# Patient Record
Sex: FEMALE | Race: WHITE | Employment: OTHER | URBAN - METROPOLITAN AREA
[De-identification: names, ages, dates, MRNs, and addresses within clinical notes are randomized per-mention and may not be internally consistent; named-entity substitution may affect disease eponyms.]

---

## 2017-01-12 ENCOUNTER — OFFICE VISIT (OUTPATIENT)
Dept: CARDIOLOGY CLINIC | Age: 72
End: 2017-01-12

## 2017-01-12 VITALS
WEIGHT: 125.8 LBS | DIASTOLIC BLOOD PRESSURE: 70 MMHG | SYSTOLIC BLOOD PRESSURE: 122 MMHG | BODY MASS INDEX: 23.15 KG/M2 | OXYGEN SATURATION: 98 % | HEART RATE: 62 BPM | HEIGHT: 62 IN

## 2017-01-12 DIAGNOSIS — E78.2 MIXED HYPERLIPIDEMIA: ICD-10-CM

## 2017-01-12 DIAGNOSIS — I10 ESSENTIAL HYPERTENSION: Primary | ICD-10-CM

## 2017-01-12 DIAGNOSIS — I25.118 CORONARY ARTERY DISEASE OF NATIVE ARTERY OF NATIVE HEART WITH STABLE ANGINA PECTORIS (HCC): ICD-10-CM

## 2017-01-12 PROCEDURE — 99214 OFFICE O/P EST MOD 30 MIN: CPT | Performed by: INTERNAL MEDICINE

## 2017-01-12 RX ORDER — AMLODIPINE BESYLATE 5 MG/1
5 TABLET ORAL DAILY
COMMUNITY
End: 2019-06-21 | Stop reason: SDUPTHER

## 2017-01-12 RX ORDER — NITROGLYCERIN 0.4 MG/1
0.4 TABLET SUBLINGUAL EVERY 5 MIN PRN
Qty: 25 TABLET | Refills: 3 | Status: SHIPPED | OUTPATIENT
Start: 2017-01-12

## 2017-01-12 RX ORDER — ISOSORBIDE MONONITRATE 30 MG/1
30 TABLET, EXTENDED RELEASE ORAL DAILY
Qty: 30 TABLET | Refills: 6 | Status: SHIPPED | OUTPATIENT
Start: 2017-01-12 | End: 2017-07-27 | Stop reason: SDUPTHER

## 2017-07-27 RX ORDER — ISOSORBIDE MONONITRATE 30 MG/1
30 TABLET, EXTENDED RELEASE ORAL DAILY
Qty: 90 TABLET | Refills: 2 | Status: SHIPPED | OUTPATIENT
Start: 2017-07-27 | End: 2018-02-26 | Stop reason: SDUPTHER

## 2017-07-28 ENCOUNTER — HOSPITAL ENCOUNTER (OUTPATIENT)
Dept: GENERAL RADIOLOGY | Age: 72
Discharge: HOME | End: 2017-07-28
Attending: INTERNAL MEDICINE | Admitting: INTERNAL MEDICINE

## 2017-07-28 LAB
A/G RATIO: 0.9 (ref 1.1–2.2)
ALBUMIN SERPL-MCNC: 3.9 G/DL (ref 3.4–5)
ALP BLD-CCNC: 73 U/L (ref 40–129)
ALT SERPL-CCNC: <5 U/L (ref 10–40)
ANION GAP SERPL CALCULATED.3IONS-SCNC: 12 MMOL/L (ref 3–16)
AST SERPL-CCNC: 27 U/L (ref 15–37)
BASOPHILS ABSOLUTE: 0 K/UL (ref 0–0.2)
BASOPHILS RELATIVE PERCENT: 0.4 %
BILIRUB SERPL-MCNC: 0.9 MG/DL (ref 0–1)
BUN BLDV-MCNC: 21 MG/DL (ref 7–20)
CALCIUM SERPL-MCNC: 9.5 MG/DL (ref 8.3–10.6)
CHLORIDE BLD-SCNC: 94 MMOL/L (ref 99–110)
CHOLESTEROL, FASTING: 171 MG/DL (ref 0–199)
CO2: 28 MMOL/L (ref 21–32)
CREAT SERPL-MCNC: 0.8 MG/DL (ref 0.6–1.2)
EOSINOPHILS ABSOLUTE: 0 K/UL (ref 0–0.6)
EOSINOPHILS RELATIVE PERCENT: 0.4 %
GFR AFRICAN AMERICAN: >60
GFR NON-AFRICAN AMERICAN: >60
GLOBULIN: 4.5 G/DL
GLUCOSE BLD-MCNC: 80 MG/DL (ref 70–99)
HCT VFR BLD CALC: 40.7 % (ref 36–48)
HDLC SERPL-MCNC: 47 MG/DL (ref 40–60)
HEMOGLOBIN: 14.1 G/DL (ref 12–16)
LDL CHOLESTEROL CALCULATED: 102 MG/DL
LYMPHOCYTES ABSOLUTE: 1.5 K/UL (ref 1–5.1)
LYMPHOCYTES RELATIVE PERCENT: 34.2 %
MCH RBC QN AUTO: 34.2 PG (ref 26–34)
MCHC RBC AUTO-ENTMCNC: 34.5 G/DL (ref 31–36)
MCV RBC AUTO: 98.9 FL (ref 80–100)
MONOCYTES ABSOLUTE: 0.5 K/UL (ref 0–1.3)
MONOCYTES RELATIVE PERCENT: 10.8 %
NEUTROPHILS ABSOLUTE: 2.4 K/UL (ref 1.7–7.7)
NEUTROPHILS RELATIVE PERCENT: 54.2 %
PDW BLD-RTO: 12.7 % (ref 12.4–15.4)
PLATELET # BLD: 211 K/UL (ref 135–450)
PMV BLD AUTO: 8 FL (ref 5–10.5)
POTASSIUM SERPL-SCNC: 4.8 MMOL/L (ref 3.5–5.1)
RBC # BLD: 4.12 M/UL (ref 4–5.2)
SODIUM BLD-SCNC: 134 MMOL/L (ref 136–145)
TOTAL PROTEIN: 8.4 G/DL (ref 6.4–8.2)
TRIGLYCERIDE, FASTING: 110 MG/DL (ref 0–150)
TSH SERPL DL<=0.05 MIU/L-ACNC: 2.14 UIU/ML (ref 0.27–4.2)
VITAMIN B-12: 646 PG/ML (ref 211–911)
VITAMIN D 25-HYDROXY: 43.4 NG/ML
VLDLC SERPL CALC-MCNC: 22 MG/DL
WBC # BLD: 4.3 K/UL (ref 4–11)

## 2018-02-26 RX ORDER — ISOSORBIDE MONONITRATE 30 MG/1
TABLET, EXTENDED RELEASE ORAL
Qty: 90 TABLET | Refills: 0 | Status: SHIPPED | OUTPATIENT
Start: 2018-02-26 | End: 2019-06-21 | Stop reason: SDUPTHER

## 2018-04-19 PROBLEM — S72.002A HIP FRACTURE, LEFT, CLOSED, INITIAL ENCOUNTER (HCC): Status: ACTIVE | Noted: 2018-04-19

## 2018-05-02 ENCOUNTER — OFFICE VISIT (OUTPATIENT)
Dept: ORTHOPEDIC SURGERY | Age: 73
End: 2018-05-02

## 2018-05-02 VITALS
DIASTOLIC BLOOD PRESSURE: 55 MMHG | BODY MASS INDEX: 22.43 KG/M2 | SYSTOLIC BLOOD PRESSURE: 100 MMHG | WEIGHT: 121.91 LBS | HEIGHT: 62 IN | HEART RATE: 77 BPM

## 2018-05-02 DIAGNOSIS — S72.002A HIP FRACTURE, LEFT, CLOSED, INITIAL ENCOUNTER (HCC): ICD-10-CM

## 2018-05-02 DIAGNOSIS — M25.552 HIP PAIN, LEFT: Primary | ICD-10-CM

## 2018-05-02 PROCEDURE — 99024 POSTOP FOLLOW-UP VISIT: CPT | Performed by: ORTHOPAEDIC SURGERY

## 2018-05-02 RX ORDER — HYDROCODONE BITARTRATE AND ACETAMINOPHEN 5; 325 MG/1; MG/1
1 TABLET ORAL EVERY 8 HOURS PRN
Qty: 20 TABLET | Refills: 0 | Status: SHIPPED | OUTPATIENT
Start: 2018-05-02 | End: 2018-05-07

## 2018-05-25 ENCOUNTER — HOSPITAL ENCOUNTER (OUTPATIENT)
Dept: GENERAL RADIOLOGY | Age: 73
Discharge: HOME | End: 2018-05-25
Attending: INTERNAL MEDICINE | Admitting: INTERNAL MEDICINE

## 2018-05-25 LAB
A/G RATIO: 0.9 (ref 1.1–2.2)
ALBUMIN SERPL-MCNC: 3.9 G/DL (ref 3.4–5)
ALP BLD-CCNC: 82 U/L (ref 40–129)
ALT SERPL-CCNC: <5 U/L (ref 10–40)
ANION GAP SERPL CALCULATED.3IONS-SCNC: 15 MMOL/L (ref 3–16)
AST SERPL-CCNC: 28 U/L (ref 15–37)
BASOPHILS ABSOLUTE: 0 K/UL (ref 0–0.2)
BASOPHILS RELATIVE PERCENT: 0.7 %
BILIRUB SERPL-MCNC: 0.3 MG/DL (ref 0–1)
BUN BLDV-MCNC: 8 MG/DL (ref 7–20)
CALCIUM SERPL-MCNC: 9.6 MG/DL (ref 8.3–10.6)
CHLORIDE BLD-SCNC: 98 MMOL/L (ref 99–110)
CHOLESTEROL, TOTAL: 158 MG/DL (ref 0–199)
CO2: 25 MMOL/L (ref 21–32)
CREAT SERPL-MCNC: 0.5 MG/DL (ref 0.6–1.2)
EOSINOPHILS ABSOLUTE: 0 K/UL (ref 0–0.6)
EOSINOPHILS RELATIVE PERCENT: 0.6 %
GFR AFRICAN AMERICAN: >60
GFR NON-AFRICAN AMERICAN: >60
GLOBULIN: 4.5 G/DL
GLUCOSE BLD-MCNC: 92 MG/DL (ref 70–99)
HCT VFR BLD CALC: 39.6 % (ref 36–48)
HDLC SERPL-MCNC: 43 MG/DL (ref 40–60)
HEMOGLOBIN: 13.6 G/DL (ref 12–16)
LDL CHOLESTEROL CALCULATED: 74 MG/DL
LYMPHOCYTES ABSOLUTE: 1.4 K/UL (ref 1–5.1)
LYMPHOCYTES RELATIVE PERCENT: 25.2 %
MCH RBC QN AUTO: 33.9 PG (ref 26–34)
MCHC RBC AUTO-ENTMCNC: 34.4 G/DL (ref 31–36)
MCV RBC AUTO: 98.4 FL (ref 80–100)
MONOCYTES ABSOLUTE: 0.4 K/UL (ref 0–1.3)
MONOCYTES RELATIVE PERCENT: 8 %
NEUTROPHILS ABSOLUTE: 3.5 K/UL (ref 1.7–7.7)
NEUTROPHILS RELATIVE PERCENT: 65.5 %
PDW BLD-RTO: 13.8 % (ref 12.4–15.4)
PLATELET # BLD: 227 K/UL (ref 135–450)
PMV BLD AUTO: 7.8 FL (ref 5–10.5)
POTASSIUM SERPL-SCNC: 4.6 MMOL/L (ref 3.5–5.1)
RBC # BLD: 4.02 M/UL (ref 4–5.2)
SODIUM BLD-SCNC: 138 MMOL/L (ref 136–145)
TOTAL PROTEIN: 8.4 G/DL (ref 6.4–8.2)
TRIGL SERPL-MCNC: 207 MG/DL (ref 0–150)
TSH SERPL DL<=0.05 MIU/L-ACNC: 3.1 UIU/ML (ref 0.27–4.2)
VITAMIN B-12: 768 PG/ML (ref 211–911)
VLDLC SERPL CALC-MCNC: 41 MG/DL
WBC # BLD: 5.4 K/UL (ref 4–11)

## 2018-06-13 ENCOUNTER — OFFICE VISIT (OUTPATIENT)
Dept: ORTHOPEDIC SURGERY | Age: 73
End: 2018-06-13

## 2018-06-13 VITALS
HEART RATE: 72 BPM | DIASTOLIC BLOOD PRESSURE: 65 MMHG | HEIGHT: 62 IN | BODY MASS INDEX: 22.43 KG/M2 | SYSTOLIC BLOOD PRESSURE: 102 MMHG | WEIGHT: 121.91 LBS

## 2018-06-13 DIAGNOSIS — S72.002A HIP FRACTURE, LEFT, CLOSED, INITIAL ENCOUNTER (HCC): ICD-10-CM

## 2018-06-13 DIAGNOSIS — M25.552 HIP PAIN, LEFT: Primary | ICD-10-CM

## 2018-06-13 PROCEDURE — 99024 POSTOP FOLLOW-UP VISIT: CPT | Performed by: ORTHOPAEDIC SURGERY

## 2018-06-13 RX ORDER — TRAMADOL HYDROCHLORIDE 50 MG/1
50 TABLET ORAL EVERY 8 HOURS PRN
Qty: 21 TABLET | Refills: 0 | Status: SHIPPED | OUTPATIENT
Start: 2018-06-13 | End: 2019-02-12 | Stop reason: SDUPTHER

## 2018-07-25 ENCOUNTER — OFFICE VISIT (OUTPATIENT)
Dept: ORTHOPEDIC SURGERY | Age: 73
End: 2018-07-25

## 2018-07-25 VITALS
HEIGHT: 62 IN | WEIGHT: 121 LBS | SYSTOLIC BLOOD PRESSURE: 142 MMHG | BODY MASS INDEX: 22.26 KG/M2 | DIASTOLIC BLOOD PRESSURE: 81 MMHG | HEART RATE: 80 BPM

## 2018-07-25 DIAGNOSIS — S72.002A HIP FRACTURE, LEFT, CLOSED, INITIAL ENCOUNTER (HCC): ICD-10-CM

## 2018-07-25 DIAGNOSIS — M25.552 HIP PAIN, LEFT: Primary | ICD-10-CM

## 2018-07-25 PROCEDURE — 99212 OFFICE O/P EST SF 10 MIN: CPT | Performed by: ORTHOPAEDIC SURGERY

## 2018-10-24 ENCOUNTER — OFFICE VISIT (OUTPATIENT)
Dept: ORTHOPEDIC SURGERY | Age: 73
End: 2018-10-24
Payer: MEDICARE

## 2018-10-24 VITALS
SYSTOLIC BLOOD PRESSURE: 136 MMHG | DIASTOLIC BLOOD PRESSURE: 80 MMHG | BODY MASS INDEX: 22.27 KG/M2 | HEIGHT: 62 IN | HEART RATE: 66 BPM | WEIGHT: 121.03 LBS

## 2018-10-24 DIAGNOSIS — S72.002A HIP FRACTURE, LEFT, CLOSED, INITIAL ENCOUNTER (HCC): ICD-10-CM

## 2018-10-24 DIAGNOSIS — M25.552 HIP PAIN, LEFT: Primary | ICD-10-CM

## 2018-10-24 PROCEDURE — 99212 OFFICE O/P EST SF 10 MIN: CPT | Performed by: ORTHOPAEDIC SURGERY

## 2018-10-26 ENCOUNTER — TELEPHONE (OUTPATIENT)
Dept: FAMILY MEDICINE CLINIC | Age: 73
End: 2018-10-26

## 2018-10-26 NOTE — TELEPHONE ENCOUNTER
Pt called wanting to become a new pt of Dr Anna Contreras. Her son-in-law, Ira Carrel, is an est pt of Dr Anna Contreras currently. Please call pt with info. Pt was advised if he can not, then Dr Romeo Espinosa is taking new pt's.  Pt did not want to est with an NP.

## 2018-10-29 ENCOUNTER — HOSPITAL ENCOUNTER (OUTPATIENT)
Dept: PHYSICAL THERAPY | Age: 73
Setting detail: THERAPIES SERIES
Discharge: HOME | End: 2018-10-29
Payer: MEDICARE

## 2018-10-29 PROCEDURE — G8980 MOBILITY D/C STATUS: HCPCS

## 2018-10-29 PROCEDURE — 97161 PT EVAL LOW COMPLEX 20 MIN: CPT

## 2018-10-29 PROCEDURE — 97112 NEUROMUSCULAR REEDUCATION: CPT

## 2018-10-29 PROCEDURE — 97110 THERAPEUTIC EXERCISES: CPT

## 2018-10-29 PROCEDURE — G8979 MOBILITY GOAL STATUS: HCPCS

## 2018-10-29 PROCEDURE — G8978 MOBILITY CURRENT STATUS: HCPCS

## 2018-10-29 NOTE — PLAN OF CARE
hemiarthroplasty   []Normal    []Hypo   []Hyper    Palpation: Tightness of iliopsoas, Adductors, and TFL on L side    Functional Mobility/Transfers: WFL    Posture: Forward head, B anterior shoulders, decreased WB onto LLE (habitual)    Bandages/Dressings/Incisions: Incision along posterolateral aspect of gluteal/hip, healed well without signs of infection or fibrosis    Gait: (include devices/WB status) Steady-cane used in RUE (per patient, instructed to continue cane use), slowed gait speed non-antalgic with use of cane (short-distances observed only)    Orthopedic Special Tests: SLR positive L>R for tightness of B hamstrings (distal/proximal)                       [x] Patient history, allergies, meds reviewed. Medical chart reviewed. See intake form. Review Of Systems (ROS):  [x]Performed Review of systems (Integumentary, CardioPulmonary, Neurological) by intake and observation. Intake form has been scanned into medical record. Patient has been instructed to contact their primary care physician regarding ROS issues if not already being addressed at this time.       Co-morbidities/Complexities (which will affect course of rehabilitation):   []None           Arthritic conditions   []Rheumatoid arthritis (M05.9)  []Osteoarthritis (M19.91)   Cardiovascular conditions   [x]Hypertension (I10)  [x]Hyperlipidemia (E78.5)  []Angina pectoris (I20)  []Atherosclerosis (I70)   Musculoskeletal conditions   []Disc pathology   []Congenital spine pathologies   []Prior surgical intervention  []Osteoporosis (M81.8)  []Osteopenia (M85.8)   Endocrine conditions   []Hypothyroid (E03.9)  []Hyperthyroid Gastrointestinal conditions   []Constipation (Q41.55)   Metabolic conditions   []Morbid obesity (E66.01)  []Diabetes type 1(E10.65) or 2 (E11.65)   []Neuropathy (G60.9)     Pulmonary conditions   []Asthma (J45)  []Coughing   []COPD (J44.9)   Psychological Disorders  []Anxiety (F41.9)  []Depression (F32.9)   []Other:   [x]Other:

## 2018-10-29 NOTE — DISCHARGE SUMMARY
[facility]    [facility] Therapy Daily Treatment / Discharge Note  Date:  10/29/2018    Patient Name:  Elin Nicholson    :  1945  MRN: [id number]  Restrictions/Precautions:    Medical/Treatment Diagnosis Information:  Diagnosis: M25.552 (ICD-10-CM) - Hip pain, left; S72.002A (ICD-10-CM) - Hip fracture, left, closed, initial encounter [facility])  Treatment Diagnosis: M25.552 L hip pain s/p L hip hemiarthroplasty (DOS )  Insurance/Certification information:  PT Insurance Information: 2018 [place]-$40CP-100%-[employer]ec-Needs Auth  Physician Information:  Referring Practitioner: Dr. Citnia Ortega of care signed (Y/N):     Date of Patient follow up with Physician: HORACE    G-Code (if applicable):      Date G-Code Applied:  10/29/18  PT G-Codes  Functional Assessment Tool Used: LEFS  Score: 49%  Functional Limitation: Mobility: Walking and moving around  Mobility: Walking and Moving Around Current Status (): At least 40 percent but less than 60 percent impaired, limited or restricted  Mobility: Walking and Moving Around Goal Status (): At least 40 percent but less than 60 percent impaired, limited or restricted  Mobility: Walking and Moving Around Discharge Status ():  At least 40 percent but less than 60 percent impaired, limited or restricted    Progress Note: [x]  Yes  []  No  Next due by: Visit #10       Latex Allergy:  [x]NO      []YES  Preferred Language for Healthcare:   [x]English       []other:    Visit # Insurance Allowable   1 Med [employer] NEEDS AUTH     Pain level: 0/10 6-7/10 with squatting/bending    SUBJECTIVE:  See eval    OBJECTIVE: See eval  Observation:   Test measurements:      RESTRICTIONS/PRECAUTIONS: Parkinson's Disease, HTN/HLD, Hip hemiarthroplasty s/p prox femoral fx (no ext/ER)    Exercises/Interventions:     Therapeutic Ex/NMR Ex Sets/sec Reps Notes HEP   HL hip add squeeze 10\" 10  x   SL clamshell 2 10ea GTB x   HSS/ proprioception of core, proximal hip and LE for self care, mobility, lifting, and ambulation/stair navigation      Manual Treatments:  PROM / STM / Oscillations-Mobs:  G-I, II, III, IV (PA's, Inf., Post.)  [] (99765) Provided manual therapy to mobilize LE, proximal hip and/or LS spine soft tissue/joints for the purpose of modulating pain, promoting relaxation,  increasing ROM, reducing/eliminating soft tissue swelling/inflammation/restriction, improving soft tissue extensibility and allowing for proper ROM for normal function with self care, mobility, lifting and ambulation. Modalities:  None    Charges:  Timed Code Treatment Minutes: 30   Total Treatment Minutes: 50     [x] EVAL low  [x] AG(21200) x  1   [] IONTO  [x] NMR (55103) x  1   [] VASO  [] Manual (14656) x       [] Other:  [] TA x       [] Mech Traction (16201)  [] ES(attended) (09209)      [] ES (un) (48567):     GOALS:  Patient stated goal: to gain strength and have less pain in left hip/groing and leg    Therapist goals for Patient:   Short Term Goals: To be achieved in: 1 week  1. Independent in HEP and progression per patient tolerance, in order to prevent re-injury. Progression Towards Functional goals:  [] Patient is progressing as expected towards functional goals listed. [] Progression is slowed due to complexities listed. [] Progression has been slowed due to co-morbidities. [x] Plan just implemented, too soon to assess goals progression  [] Other:     ASSESSMENT:  See eval    Treatment/Activity Tolerance:  [x] Patient tolerated treatment well [] Patient limited by fatique  [x] Patient limited by pain  [] Patient limited by other medical complications  [] Other:     Prognosis: [x] Good [] Fair  [] Poor    Patient Requires Follow-up: [] Yes  [x] No    PLAN: See eval. Referral HEP 1-2 visits.  Pt preference for this to be final/only visit  [] Continue per plan of care [] Alter current plan (see comments)  [] Plan of care initiated []

## 2018-11-26 ENCOUNTER — HOSPITAL ENCOUNTER (OUTPATIENT)
Dept: WOMENS IMAGING | Age: 73
Discharge: HOME | End: 2018-11-26
Payer: MEDICARE

## 2018-11-26 DIAGNOSIS — M81.0 OSTEOPOROSIS, SENILE: ICD-10-CM

## 2018-11-26 PROCEDURE — 77080 DXA BONE DENSITY AXIAL: CPT

## 2019-01-22 ENCOUNTER — OFFICE VISIT (OUTPATIENT)
Dept: FAMILY MEDICINE CLINIC | Age: 74
End: 2019-01-22
Payer: MEDICARE

## 2019-01-22 VITALS
OXYGEN SATURATION: 99 % | WEIGHT: 121 LBS | HEART RATE: 68 BPM | BODY MASS INDEX: 22.27 KG/M2 | DIASTOLIC BLOOD PRESSURE: 88 MMHG | SYSTOLIC BLOOD PRESSURE: 138 MMHG

## 2019-01-22 DIAGNOSIS — E78.2 MIXED HYPERLIPIDEMIA: ICD-10-CM

## 2019-01-22 DIAGNOSIS — G20 PARKINSON DISEASE (HCC): ICD-10-CM

## 2019-01-22 DIAGNOSIS — R53.83 FATIGUE, UNSPECIFIED TYPE: ICD-10-CM

## 2019-01-22 DIAGNOSIS — M85.80 OSTEOPENIA, UNSPECIFIED LOCATION: ICD-10-CM

## 2019-01-22 DIAGNOSIS — I10 ESSENTIAL HYPERTENSION: ICD-10-CM

## 2019-01-22 DIAGNOSIS — M35.00 SJOGREN'S SYNDROME, WITH UNSPECIFIED ORGAN INVOLVEMENT (HCC): Primary | ICD-10-CM

## 2019-01-22 LAB
A/G RATIO: 1.1 (ref 1.1–2.2)
ALBUMIN SERPL-MCNC: 4.5 G/DL (ref 3.4–5)
ALP BLD-CCNC: 74 U/L (ref 40–129)
ALT SERPL-CCNC: 6 U/L (ref 10–40)
ANION GAP SERPL CALCULATED.3IONS-SCNC: 12 MMOL/L (ref 3–16)
AST SERPL-CCNC: 25 U/L (ref 15–37)
BASOPHILS ABSOLUTE: 0 K/UL (ref 0–0.2)
BASOPHILS RELATIVE PERCENT: 0.3 %
BILIRUB SERPL-MCNC: 0.6 MG/DL (ref 0–1)
BUN BLDV-MCNC: 17 MG/DL (ref 7–20)
C-REACTIVE PROTEIN: 0.6 MG/L (ref 0–5.1)
CALCIUM SERPL-MCNC: 9.4 MG/DL (ref 8.3–10.6)
CHLORIDE BLD-SCNC: 104 MMOL/L (ref 99–110)
CO2: 27 MMOL/L (ref 21–32)
CREAT SERPL-MCNC: 0.8 MG/DL (ref 0.6–1.2)
EOSINOPHILS ABSOLUTE: 0 K/UL (ref 0–0.6)
EOSINOPHILS RELATIVE PERCENT: 0.9 %
GFR AFRICAN AMERICAN: >60
GFR NON-AFRICAN AMERICAN: >60
GLOBULIN: 4.2 G/DL
GLUCOSE BLD-MCNC: 98 MG/DL (ref 70–99)
HCT VFR BLD CALC: 41.9 % (ref 36–48)
HEMOGLOBIN: 14.1 G/DL (ref 12–16)
LYMPHOCYTES ABSOLUTE: 1.1 K/UL (ref 1–5.1)
LYMPHOCYTES RELATIVE PERCENT: 32.2 %
MCH RBC QN AUTO: 33 PG (ref 26–34)
MCHC RBC AUTO-ENTMCNC: 33.6 G/DL (ref 31–36)
MCV RBC AUTO: 98.2 FL (ref 80–100)
MONOCYTES ABSOLUTE: 0.4 K/UL (ref 0–1.3)
MONOCYTES RELATIVE PERCENT: 10.2 %
NEUTROPHILS ABSOLUTE: 2 K/UL (ref 1.7–7.7)
NEUTROPHILS RELATIVE PERCENT: 56.4 %
PDW BLD-RTO: 12.5 % (ref 12.4–15.4)
PLATELET # BLD: 180 K/UL (ref 135–450)
PMV BLD AUTO: 8.2 FL (ref 5–10.5)
POTASSIUM SERPL-SCNC: 4.3 MMOL/L (ref 3.5–5.1)
RBC # BLD: 4.27 M/UL (ref 4–5.2)
SEDIMENTATION RATE, ERYTHROCYTE: 84 MM/HR (ref 0–30)
SODIUM BLD-SCNC: 143 MMOL/L (ref 136–145)
TOTAL PROTEIN: 8.7 G/DL (ref 6.4–8.2)
TSH REFLEX: 3.96 UIU/ML (ref 0.27–4.2)
WBC # BLD: 3.5 K/UL (ref 4–11)

## 2019-01-22 PROCEDURE — 36415 COLL VENOUS BLD VENIPUNCTURE: CPT | Performed by: FAMILY MEDICINE

## 2019-01-22 PROCEDURE — 99214 OFFICE O/P EST MOD 30 MIN: CPT | Performed by: FAMILY MEDICINE

## 2019-01-22 RX ORDER — ALENDRONATE SODIUM 70 MG/1
70 TABLET ORAL
Qty: 4 TABLET | Refills: 5 | Status: SHIPPED | OUTPATIENT
Start: 2019-01-22 | End: 2019-03-15 | Stop reason: ALTCHOICE

## 2019-01-22 RX ORDER — GUAIFENESIN 600 MG/1
1200 TABLET, EXTENDED RELEASE ORAL PRN
COMMUNITY
End: 2022-06-27 | Stop reason: ALTCHOICE

## 2019-01-22 RX ORDER — B-COMPLEX WITH VITAMIN C
1 TABLET ORAL DAILY
Qty: 30 TABLET | Refills: 0 | Status: SHIPPED | OUTPATIENT
Start: 2019-01-22 | End: 2019-02-08 | Stop reason: SDUPTHER

## 2019-01-22 RX ORDER — LOSARTAN POTASSIUM 50 MG/1
TABLET ORAL
COMMUNITY
Start: 2018-11-21 | End: 2019-04-23 | Stop reason: SDUPTHER

## 2019-01-22 ASSESSMENT — PATIENT HEALTH QUESTIONNAIRE - PHQ9
2. FEELING DOWN, DEPRESSED OR HOPELESS: 0
SUM OF ALL RESPONSES TO PHQ9 QUESTIONS 1 & 2: 0
SUM OF ALL RESPONSES TO PHQ QUESTIONS 1-9: 0
SUM OF ALL RESPONSES TO PHQ QUESTIONS 1-9: 0
1. LITTLE INTEREST OR PLEASURE IN DOING THINGS: 0

## 2019-01-23 ENCOUNTER — TELEPHONE (OUTPATIENT)
Dept: FAMILY MEDICINE CLINIC | Age: 74
End: 2019-01-23

## 2019-01-23 LAB
CHOLESTEROL, TOTAL: 195 MG/DL (ref 0–199)
HDLC SERPL-MCNC: 45 MG/DL (ref 40–60)
LDL CHOLESTEROL CALCULATED: 124 MG/DL
TRIGL SERPL-MCNC: 131 MG/DL (ref 0–150)
VLDLC SERPL CALC-MCNC: 26 MG/DL

## 2019-02-01 ENCOUNTER — OFFICE VISIT (OUTPATIENT)
Dept: ORTHOPEDIC SURGERY | Age: 74
End: 2019-02-01
Payer: MEDICARE

## 2019-02-01 VITALS
HEART RATE: 84 BPM | WEIGHT: 121.03 LBS | HEIGHT: 62 IN | SYSTOLIC BLOOD PRESSURE: 154 MMHG | DIASTOLIC BLOOD PRESSURE: 101 MMHG | BODY MASS INDEX: 22.27 KG/M2

## 2019-02-01 DIAGNOSIS — M25.552 LEFT HIP PAIN: Primary | ICD-10-CM

## 2019-02-01 DIAGNOSIS — S72.002A HIP FRACTURE, LEFT, CLOSED, INITIAL ENCOUNTER (HCC): ICD-10-CM

## 2019-02-01 PROCEDURE — 99212 OFFICE O/P EST SF 10 MIN: CPT | Performed by: ORTHOPAEDIC SURGERY

## 2019-02-08 DIAGNOSIS — M85.80 OSTEOPENIA, UNSPECIFIED LOCATION: ICD-10-CM

## 2019-02-08 RX ORDER — B-COMPLEX WITH VITAMIN C
TABLET ORAL
Qty: 30 TABLET | Refills: 0 | Status: SHIPPED | OUTPATIENT
Start: 2019-02-08

## 2019-02-12 ENCOUNTER — TELEPHONE (OUTPATIENT)
Dept: ORTHOPEDIC SURGERY | Age: 74
End: 2019-02-12

## 2019-02-12 DIAGNOSIS — S72.002A HIP FRACTURE, LEFT, CLOSED, INITIAL ENCOUNTER (HCC): ICD-10-CM

## 2019-02-12 RX ORDER — TRAMADOL HYDROCHLORIDE 50 MG/1
50 TABLET ORAL EVERY 8 HOURS PRN
Qty: 21 TABLET | Refills: 0 | Status: SHIPPED | OUTPATIENT
Start: 2019-02-12 | End: 2019-05-24 | Stop reason: SDUPTHER

## 2019-02-21 ENCOUNTER — OFFICE VISIT (OUTPATIENT)
Dept: FAMILY MEDICINE CLINIC | Age: 74
End: 2019-02-21
Payer: MEDICARE

## 2019-02-21 VITALS
SYSTOLIC BLOOD PRESSURE: 138 MMHG | OXYGEN SATURATION: 98 % | WEIGHT: 122 LBS | DIASTOLIC BLOOD PRESSURE: 60 MMHG | HEART RATE: 72 BPM | BODY MASS INDEX: 22.45 KG/M2

## 2019-02-21 DIAGNOSIS — M85.80 OSTEOPENIA, UNSPECIFIED LOCATION: ICD-10-CM

## 2019-02-21 DIAGNOSIS — K20.90 ESOPHAGITIS: Primary | ICD-10-CM

## 2019-02-21 PROCEDURE — 99214 OFFICE O/P EST MOD 30 MIN: CPT | Performed by: FAMILY MEDICINE

## 2019-02-21 RX ORDER — SUCRALFATE ORAL 1 G/10ML
1 SUSPENSION ORAL 4 TIMES DAILY
Qty: 1200 ML | Refills: 0 | Status: SHIPPED | OUTPATIENT
Start: 2019-02-21 | End: 2019-03-18

## 2019-02-21 ASSESSMENT — ENCOUNTER SYMPTOMS
BLOOD IN STOOL: 0
COUGH: 0
BACK PAIN: 1
CONSTIPATION: 0
ABDOMINAL PAIN: 0
DIARRHEA: 0
CHEST TIGHTNESS: 0

## 2019-03-01 PROBLEM — M85.80 OSTEOPENIA: Status: ACTIVE | Noted: 2019-03-01

## 2019-03-05 ENCOUNTER — TELEPHONE (OUTPATIENT)
Dept: FAMILY MEDICINE CLINIC | Age: 74
End: 2019-03-05

## 2019-03-06 RX ORDER — CANDESARTAN 16 MG/1
16 TABLET ORAL DAILY
Qty: 30 TABLET | Refills: 5 | Status: SHIPPED | OUTPATIENT
Start: 2019-03-06 | End: 2019-03-18

## 2019-03-15 ENCOUNTER — TELEPHONE (OUTPATIENT)
Dept: FAMILY MEDICINE CLINIC | Age: 74
End: 2019-03-15

## 2019-03-15 ENCOUNTER — HOSPITAL ENCOUNTER (OUTPATIENT)
Dept: NURSING | Age: 74
Setting detail: INFUSION SERIES
Discharge: HOME | End: 2019-03-15
Payer: MEDICARE

## 2019-03-15 VITALS
RESPIRATION RATE: 18 BRPM | TEMPERATURE: 97.8 F | DIASTOLIC BLOOD PRESSURE: 77 MMHG | HEART RATE: 56 BPM | WEIGHT: 120 LBS | HEIGHT: 61 IN | SYSTOLIC BLOOD PRESSURE: 130 MMHG | BODY MASS INDEX: 22.66 KG/M2

## 2019-03-15 DIAGNOSIS — M85.80 OSTEOPENIA, UNSPECIFIED LOCATION: Primary | ICD-10-CM

## 2019-03-15 PROCEDURE — 96372 THER/PROPH/DIAG INJ SC/IM: CPT

## 2019-03-15 PROCEDURE — 6360000002 HC RX W HCPCS: Performed by: FAMILY MEDICINE

## 2019-03-15 RX ADMIN — DENOSUMAB 60 MG: 60 INJECTION SUBCUTANEOUS at 10:26

## 2019-03-15 ASSESSMENT — PAIN - FUNCTIONAL ASSESSMENT: PAIN_FUNCTIONAL_ASSESSMENT: 0-10

## 2019-03-29 ENCOUNTER — TELEPHONE (OUTPATIENT)
Dept: FAMILY MEDICINE CLINIC | Age: 74
End: 2019-03-29

## 2019-03-29 RX ORDER — CARBIDOPA AND LEVODOPA 25; 100 MG/1; MG/1
1 TABLET, ORALLY DISINTEGRATING ORAL 3 TIMES DAILY
Qty: 270 TABLET | Refills: 1 | Status: SHIPPED | OUTPATIENT
Start: 2019-03-29 | End: 2019-05-07 | Stop reason: SDUPTHER

## 2019-03-29 NOTE — TELEPHONE ENCOUNTER
Pharmacy called on medication. Pt takes the medication sinement  mg tabs. She takes 1 tablet 3 times daily. Gets a 90 day supply of #270. rxd previously by dr Osorio Hernández.

## 2019-03-29 NOTE — TELEPHONE ENCOUNTER
I called [facility] and they said that Dr. Ciaran Ferrer was her previous PCP but now [facility] is so he would need to fill it.

## 2019-04-09 DIAGNOSIS — M25.552 LEFT HIP PAIN: Primary | ICD-10-CM

## 2019-04-16 ENCOUNTER — HOSPITAL ENCOUNTER (OUTPATIENT)
Dept: PHYSICAL THERAPY | Age: 74
Setting detail: THERAPIES SERIES
Discharge: HOME | End: 2019-04-16
Payer: MEDICARE

## 2019-04-16 PROCEDURE — 97110 THERAPEUTIC EXERCISES: CPT | Performed by: PHYSICAL THERAPIST

## 2019-04-16 PROCEDURE — 97161 PT EVAL LOW COMPLEX 20 MIN: CPT | Performed by: PHYSICAL THERAPIST

## 2019-04-16 NOTE — FLOWSHEET NOTE
[address] [place] [address] [facility]    [facility] Therapy Daily Treatment Note  Date:  2019    Patient Name:  Tessy Tatum    :  1945  MRN: [id number]  Restrictions/Precautions:    Medical/Treatment Diagnosis Information:  ·   M25.552 (ICD-10-CM) - Left hip pain     Insurance/Certification information:   [employer]  Physician Information:   Yesica  Plan of care signed (Y/N):     Date of Patient follow up with Physician:     G-Code (if applicable):      Date G-Code Applied:    PT G-Codes  Functional Assessment Tool Used: lefs  Score: 47%    Progress Note: [x]  Yes  []  No  Next due by: Visit #10       Latex Allergy:  [x]NO      []YES  Preferred Language for Healthcare:   [x]English       []other:    Visit # Insurance Allowable   1 auth needed     Pain level:  3-6/10     SUBJECTIVE:  See eval    OBJECTIVE: See eval  Observation:   Test measurements:      RESTRICTIONS/PRECAUTIONS: parkinsons, osteoporosis    Exercises/Interventions:     Therapeutic Ex Sets/sec Reps Notes HEP   Bridges  SLR  QS  Supine marching alt legs  SSLR  SL clams  Knee ext  HR  Standing hip ext  SLB  Standing hip flex L  x10  x1 pain  10x5s  x20  Groin pain  x30  x10  Reviewed  x10  x1 pain  x10                                                                                                              Manual Intervention                                                 NMR re-education                                                                          Therapeutic Exercise and NMR EXR  [x] (59363) Provided verbal/tactile cueing for activities related to strengthening, flexibility, endurance, ROM for improvements in LE, proximal hip, and core control with self care, mobility, lifting, ambulation.  [] (15167) Provided verbal/tactile cueing for activities related to improving balance, coordination, kinesthetic sense, posture, motor skill, proprioception  to assist with LE, proximal hip, and core control in self care, mobility, lifting, ambulation and eccentric single leg control. NMR and Therapeutic Activities:    [] (47721 or 02655) Provided verbal/tactile cueing for activities related to improving balance, coordination, kinesthetic sense, posture, motor skill, proprioception and motor activation to allow for proper function of core, proximal hip and LE with self care and ADLs  [] (52830) Gait Re-education- Provided training and instruction to the patient for proper LE, core and proximal hip recruitment and positioning and eccentric body weight control with ambulation re-education including up and down stairs     Home Exercise Program:    [x] (52114) Reviewed/Progressed HEP activities related to strengthening, flexibility, endurance, ROM of core, proximal hip and LE for functional self-care, mobility, lifting and ambulation/stair navigation   [] (29487)Reviewed/Progressed HEP activities related to improving balance, coordination, kinesthetic sense, posture, motor skill, proprioception of core, proximal hip and LE for self care, mobility, lifting, and ambulation/stair navigation      Manual Treatments:  PROM / STM / Oscillations-Mobs:  G-I, II, III, IV (PA's, Inf., Post.)  [] (41712) Provided manual therapy to mobilize LE, proximal hip and/or LS spine soft tissue/joints for the purpose of modulating pain, promoting relaxation,  increasing ROM, reducing/eliminating soft tissue swelling/inflammation/restriction, improving soft tissue extensibility and allowing for proper ROM for normal function with self care, mobility, lifting and ambulation.      Modalities:      Charges:  Timed Code Treatment Minutes: 20   Total Treatment Minutes: 40     [x] EVAL(LOW) 24207 (typically 20 minutes   [x] CL(71665) x      [] IONTO  [] NMR (11792) x      [] VASO  [] Manual (92262) x       [] Other:  [] TA x       [] Mech Traction (21352)  [] ES(attended) (36803)      [] ES (un) (45825):     GOALS: Patient stated goal: no pain     Therapist goals for Patient:   Short Term Goals: To be achieved in: 2 weeks  1. Independent in HEP and progression per patient tolerance, in order to prevent re-injury. 2. Patient will have a decrease in pain to facilitate improvement in movement, function, and ADLs as indicated by Functional Deficits.      Long Term Goals: To be achieved in: 12 weeks  1. Disability index score of 19% or less for the LEFS to assist with reaching prior level of function. 2. Patient will demonstrate increased AROM to WNL to allow for proper joint functioning as indicated by patients Functional Deficits. 3. Patient will demonstrate an increase in Strength to good proximal hip strength and control, within 5lb HHD in LE to allow for proper functional mobility as indicated by patients Functional Deficits. 4. Patient will return tofull functional activities without increased symptoms or restriction. 5. Stand and squat pain free (patient specific functional goal)               Progression Towards Functional goals:  [] Patient is progressing as expected towards functional goals listed. [] Progression is slowed due to complexities listed. [] Progression has been slowed due to co-morbidities.   [x] Plan just implemented, too soon to assess goals progression  [] Other:     ASSESSMENT:  See eval    Treatment/Activity Tolerance:  [x] Patient tolerated treatment well [] Patient limited by fatique  [] Patient limited by pain  [] Patient limited by other medical complications  [] Other:     Prognosis: [x] Good [] Fair  [] Poor    Patient Requires Follow-up: [x] Yes  [] No    PLAN: See eval  [] Continue per plan of care [] Alter current plan (see comments)  [x] Plan of care initiated [] Hold pending MD visit [] Discharge    Electronically signed by: Shelley Telles PT

## 2019-04-16 NOTE — PLAN OF CARE
[address] [facility] and [address] [place] [address] [address] [place] Nathaniel Garcia, [address] [facility] [address]  Phone: [phone]   Fax:     [phone]       Hamler Telma    Dear  Dr Katrina Wolf,    We had the pleasure of evaluating the following patient for physical therapy services at [address] [place] [address]. A summary of our findings can be found in the initial assessment below. This includes our plan of care. If you have any questions or concerns regarding these findings, please do not hesitate to contact me at the office phone number checked above. Thank you for the referral.       Physician Signature:_______________________________Date:__________________  By signing above (or electronic signature), therapists plan is approved by physician      Patient: Diann Siegel   : 1945   MRN: [id number]  Referring Physician:  Katrina Wolf      Evaluation Date: 2019      Medical Diagnosis Information:    S23.148 (ICD-10-CM) - Left hip pain                                             Insurance information:  [facility]     Precautions/ Contra-indications: osteoporosis doing shots for it, Parkinsons  Latex Allergy:  [x]NO      []YES  Preferred Language for Healthcare:   [x]English       []other:    SUBJECTIVE: Patient stated complaint:L hip fx a  had ORIF. Still having pain and legs feel tired. Fatigues easily. Home PT in October. Sometimes hurts to the point where it feels like it wants to break when standing or walking.   Wears knee high compression socks  Has a hip brace she wears sometime  Has compression pants she wears    Has SL clams, squats, HR, ellipical bike,  Walks 4x/day    Relevant Medical History:  Functional Disability Index:PT G-Codes  Functional Assessment Tool Used: lefs  Score: 47%    Pain Scale: 3-6/10  Easing factors: sitting, rest  Provocative factors: standing walking     Type: []Constant   []Intermittent  []Radiating []Localized []other:     Numbness/Tingling: none    Occupation/School:retired    Living Status/Prior Level of Function: Independent with ADLs and IADLs,     OBJECTIVE:     ROM LEFT RIGHT   HIP Flex     HIP Abd     HIP Ext     HIP IR     HIP ER     Knee ext     Knee Flex     Ankle PF     Ankle DF     Ankle In     Ankle Ev     Strength  LEFT RIGHT   HIP Flexors 5/5 P! HIP Abductors 3/5 P! HIP Ext     Hip ER     Knee EXT (quad) 5/5    Knee Flex (HS) 5/5    Ankle DF     Ankle PF     Ankle Inv     Ankle EV          Circumference  Mid apex  7 cm prox             Reflexes/Sensation:    [x]Dermatomes/Myotomes intact    [x]Reflexes equal and normal bilaterally   []Other:    Joint mobility:    []Normal    []Hypo   []Hyper    Palpation: gt troch    Functional Mobility/Transfers: cane    Posture: good    Bandages/Dressings/Incisions: na    Gait: (include devices/WB status) cane    Orthopedic Special Tests:                        [x] Patient history, allergies, meds reviewed. Medical chart reviewed. See intake form. Review Of Systems (ROS):  [x]Performed Review of systems (Integumentary, CardioPulmonary, Neurological) by intake and observation. Intake form has been scanned into medical record. Patient has been instructed to contact their primary care physician regarding ROS issues if not already being addressed at this time.       Co-morbidities/Complexities (which will affect course of rehabilitation):   []None           Arthritic conditions   []Rheumatoid arthritis (M05.9)  []Osteoarthritis (M19.91)   Cardiovascular conditions   []Hypertension (I10)  []Hyperlipidemia (E78.5)  []Angina pectoris (I20)  []Atherosclerosis (I70)   Musculoskeletal conditions   []Disc pathology   []Congenital spine pathologies   []Prior surgical intervention  [x]Osteoporosis (M81.8)  []Osteopenia (M85.8)   Endocrine conditions   []Hypothyroid (E03.9)  []Hyperthyroid Gastrointestinal conditions []Constipation (K09.51)   Metabolic conditions   []Morbid obesity (E66.01)  []Diabetes type 1(E10.65) or 2 (E11.65)   []Neuropathy (G60.9)     Pulmonary conditions   []Asthma (J45)  []Coughing   []COPD (J44.9)   Psychological Disorders  []Anxiety (F41.9)  []Depression (F32.9)   []Other:   []Other:     parkinsons      Barriers to/and or personal factors that will affect rehab potential:              []Age  []Sex              []Motivation/Lack of Motivation                        [x]Co-Morbidities              []Cognitive Function, education/learning barriers              []Environmental, home barriers              []profession/work barriers  []past PT/medical experience  []other:  Justification: may limit ex progression    Falls Risk Assessment (30 days): 0  [] Falls Risk assessed and no intervention required.   [] Falls Risk assessed and Patient requires intervention due to being higher risk   TUG score (>12s at risk):     [] Falls education provided, including       G-Codes:  PT G-Codes  Functional Assessment Tool Used: lefs  Score: 47%    ASSESSMENT:   Functional Impairments:     []Noted lumbar/proximal hip/LE joint hypomobility   []Decreased LE functional ROM   [x]Decreased core/proximal hip strength and neuromuscular control   [x]Decreased LE functional strength   [x]Reduced balance/proprioceptive control   []other:      Functional Activity Limitations (from functional questionnaire and intake)   []Reduced ability to tolerate prolonged functional positions   []Reduced ability or difficulty with changes of positions or transfers between positions   []Reduced ability to maintain good posture and demonstrate good body mechanics with sitting, bending, and lifting   []Reduced ability to sleep   [] Reduced ability or tolerance with driving and/or computer work   []Reduced ability to perform lifting, carrying tasks   []Reduced ability to squat   [x]Reduced ability to forward bend   [x]Reduced ability to ambulate prolonged functional periods/distances/surfaces   [x]Reduced ability to ascend/descend stairs   []Reduced ability to run, hop, cut or jump   []other:    Participation Restrictions   []Reduced participation in self care activities   [x]Reduced participation in home management activities   []Reduced participation in work activities   [x]Reduced participation in social activities. []Reduced participation in sport/recreation activities. Classification :    []Signs/symptoms consistent with post-surgical status including decreased ROM, strength and function.    []Signs/symptoms consistent with joint sprain/strain   []Signs/symptoms consistent with patella-femoral syndrome   []Signs/symptoms consistent with knee OA/hip OA   [x]Signs/symptoms consistent with internal derangement of knee/Hip   []Signs/symptoms consistent with functional hip weakness/NMR control      []Signs/symptoms consistent with tendinitis/tendinosis    []signs/symptoms consistent with pathology which may benefit from Dry needling      []other:      Prognosis/Rehab Potential:      []Excellent   [x]Good    []Fair   []Poor    Tolerance of evaluation/treatment:    []Excellent   [x]Good    []Fair   []Poor    Physical Therapy Evaluation Complexity Justification  [x] A history of present problem with:  [] no personal factors and/or comorbidities that impact the plan of care;  [x]1-2 personal factors and/or comorbidities that impact the plan of care  []3 personal factors and/or comorbidities that impact the plan of care  [x] An examination of body systems using standardized tests and measures addressing any of the following: body structures and functions (impairments), activity limitations, and/or participation restrictions;:  [x] a total of 1-2 or more elements   [] a total of 3 or more elements   [] a total of 4 or more elements   [x] A clinical presentation with:  [x] stable and/or uncomplicated characteristics   [] evolving clinical presentation with changing characteristics  [] unstable and unpredictable characteristics;   [x] Clinical decision making of [x] low, [] moderate, [] high complexity using standardized patient assessment instrument and/or measurable assessment of functional outcome. [x] EVAL (LOW) 25590 (typically 20 minutes face-to-face)  [] EVAL (MOD) 30446 (typically 30 minutes face-to-face)  [] EVAL (HIGH) 08275 (typically 45 minutes face-to-face)  [] RE-EVAL     PLAN:   Frequency/Duration:  1 days per week for PRN per patient Weeks:  Interventions:  [x]  Therapeutic exercise including: strength training, ROM, for Lower extremity and core   [x]  NMR activation and proprioception for LE, Glutes and Core   [x]  Manual therapy as indicated for LE, Hip and spine to include: Dry Needling/IASTM, STM, PROM, Gr I-IV mobilizations, manipulation. [x] Modalities as needed that may include: thermal agents, E-stim, Biofeedback, US, iontophoresis as indicated  [x] Patient education on joint protection, postural re-education, activity modification, progression of HEP. HEP instruction: (see scanned forms)    GOALS:  Patient stated goal: no pain    Therapist goals for Patient:   Short Term Goals: To be achieved in: 2 weeks  1. Independent in HEP and progression per patient tolerance, in order to prevent re-injury. 2. Patient will have a decrease in pain to facilitate improvement in movement, function, and ADLs as indicated by Functional Deficits. Long Term Goals: To be achieved in: 12 weeks  1. Disability index score of 19% or less for the LEFS to assist with reaching prior level of function. 2. Patient will demonstrate increased AROM to WNL to allow for proper joint functioning as indicated by patients Functional Deficits. 3. Patient will demonstrate an increase in Strength to good proximal hip strength and control, within 5lb HHD in LE to allow for proper functional mobility as indicated by patients Functional Deficits.    4. Patient will return tofull functional activities without increased symptoms or restriction.    5. Stand and squat pain free (patient specific functional goal)       Electronically signed by:  Arjun West PT

## 2019-04-23 RX ORDER — LOSARTAN POTASSIUM 50 MG/1
50 TABLET ORAL DAILY
Qty: 90 TABLET | Refills: 1 | Status: SHIPPED | OUTPATIENT
Start: 2019-04-23 | End: 2019-08-23 | Stop reason: SDUPTHER

## 2019-04-29 ENCOUNTER — TELEPHONE (OUTPATIENT)
Dept: FAMILY MEDICINE CLINIC | Age: 74
End: 2019-04-29

## 2019-04-29 NOTE — TELEPHONE ENCOUNTER
Patient called and states that has a ganglion cyst on her left wrist that it getting bigger.  Patient states it she has pain from it when she moved her wrist.    Patient wants to know if Dr. Rodolfo Fierro can drain it or does she need to be seen by a specialist.

## 2019-05-07 ENCOUNTER — OFFICE VISIT (OUTPATIENT)
Dept: FAMILY MEDICINE CLINIC | Age: 74
End: 2019-05-07
Payer: MEDICARE

## 2019-05-07 VITALS
OXYGEN SATURATION: 98 % | WEIGHT: 126 LBS | HEART RATE: 69 BPM | BODY MASS INDEX: 23.81 KG/M2 | DIASTOLIC BLOOD PRESSURE: 74 MMHG | SYSTOLIC BLOOD PRESSURE: 138 MMHG

## 2019-05-07 DIAGNOSIS — G20 PARKINSON DISEASE (HCC): Primary | ICD-10-CM

## 2019-05-07 DIAGNOSIS — M67.40 GANGLION CYST: ICD-10-CM

## 2019-05-07 PROCEDURE — 99214 OFFICE O/P EST MOD 30 MIN: CPT | Performed by: FAMILY MEDICINE

## 2019-05-07 RX ORDER — CARBIDOPA AND LEVODOPA 25; 100 MG/1; MG/1
1 TABLET, ORALLY DISINTEGRATING ORAL 4 TIMES DAILY
Qty: 360 TABLET | Refills: 1 | Status: SHIPPED | OUTPATIENT
Start: 2019-05-07 | End: 2019-12-13 | Stop reason: SDUPTHER

## 2019-05-19 NOTE — PROGRESS NOTES
2019     Miir Morris (:  1945) is a 68 y.o. female, here for evaluation of the following medical concerns: Miri Morris is a 68 y.o. female. Patient presents with:  Cyst: ganglion cyst on lt wrist, painful   Other: should parcopa be increased       Patient presents with ganglion cyst of left wrist.  She notes that this is hurting all of the time. She notes that it has been getting bigger. She notes it is worse with movement. Patient also has Parkisonism and she has been on parcopa for this. She notes she has been struggling when her dose wears off. She would like to see if she can increase her dose. The patients PMH, surgical history, family history, medications, allergies were all reviewed and updated as appropriate today. Other           Review of Systems    Prior to Visit Medications    Medication Sig Taking?  Authorizing Provider   carbidopa-levodopa (PARCOPA)  MG TBDP Take 1 tablet by mouth 4 times daily Yes Aron Vásquez MD   losartan (COZAAR) 50 MG tablet Take 1 tablet by mouth daily Yes Aron Vásquez MD   denosumab (PROLIA) 60 MG/ML SOLN SC injection Inject 1 mL into the skin every 6 months Yes Aron Vásquez MD   Calcium Carbonate-Vitamin D (OYSTER SHELL CALCIUM/D) 500-200 MG-UNIT TABS TAKE 1 TABLET BY MOUTH ONCE DAILY Yes HIRAM Juarez - CNP   guaiFENesin (MUCINEX) 600 MG extended release tablet Take 1,200 mg by mouth 2 times daily Yes Historical Provider, MD   isosorbide mononitrate (IMDUR) 30 MG extended release tablet TAKE ONE TABLET BY MOUTH ONCE DAILY Yes Haylie Rizzo MD   metoprolol tartrate (LOPRESSOR) 25 MG tablet TAKE ONE TABLET BY MOUTH TWICE A DAY Yes Haylie Rizzo MD   amLODIPine (NORVASC) 5 MG tablet Take 5 mg by mouth daily Yes Historical Provider, MD   Multiple Vitamins-Minerals (THERAPEUTIC MULTIVITAMIN-MINERALS) tablet Take 1 tablet by mouth daily Yes Historical Provider, MD   montelukast (SINGULAIR) 10 MG tablet Take 10 mg by mouth nightly Yes Historical Provider, MD   meloxicam (MOBIC) 15 MG tablet Take 15 mg by mouth daily Yes Historical Provider, MD   lovastatin (MEVACOR) 20 MG tablet Take 1 tablet by mouth nightly Yes Neal Mosquera MD   PREVIDENT 5000 BOOSTER PLUS 1.1 % PSTE USE DAILY IN MOUTH. Yes Katherine Nuñez MD   polyvinyl alcohol-povidone (HYPOTEARS) 1.4-0.6 % ophthalmic solution 1-2 drops as needed. Yes Historical Provider, MD   nitroGLYCERIN (NITROSTAT) 0.4 MG SL tablet Place 1 tablet under the tongue every 5 minutes as needed for Chest pain  Neal Mosquera MD   butalbital-acetaminophen-caffeine (FIORICET, ESGIC) -40 MG per tablet Take 1 tablet by mouth every 4 hours as needed for Headaches  Historical Provider, MD   mometasone (NASONEX) 50 MCG/ACT nasal spray 2 sprays by Nasal route daily. Each nostril  Katherine Nuñez MD   aspirin 81 MG tablet Take 81 mg by mouth daily. Historical Provider, MD        Social History     Tobacco Use    Smoking status: Never Smoker    Smokeless tobacco: Never Used   Substance Use Topics    Alcohol use: No        Vitals:    05/07/19 0956   BP: 138/74   Pulse: 69   SpO2: 98%   Weight: 126 lb (57.2 kg)     Estimated body mass index is 23.81 kg/m² as calculated from the following:    Height as of 3/15/19: 5' 1\" (1.549 m). Weight as of this encounter: 126 lb (57.2 kg). Physical Exam   Constitutional: She is oriented to person, place, and time. She appears well-developed and well-nourished. HENT:   Head: Normocephalic and atraumatic. Right Ear: External ear normal.   Left Ear: External ear normal.   Nose: Nose normal.   Mouth/Throat: Oropharynx is clear and moist.   Eyes: Pupils are equal, round, and reactive to light. Conjunctivae are normal.   Neck: Normal range of motion. Neck supple. Cardiovascular: Normal rate, regular rhythm, normal heart sounds and intact distal pulses. Pulmonary/Chest: Effort normal and breath sounds normal.   Abdominal: Soft.  Bowel

## 2019-05-23 DIAGNOSIS — S72.002A HIP FRACTURE, LEFT, CLOSED, INITIAL ENCOUNTER (HCC): ICD-10-CM

## 2019-05-24 RX ORDER — TRAMADOL HYDROCHLORIDE 50 MG/1
50 TABLET ORAL EVERY 8 HOURS PRN
Qty: 21 TABLET | Refills: 0 | Status: SHIPPED | OUTPATIENT
Start: 2019-05-24 | End: 2019-05-31

## 2019-05-28 ENCOUNTER — TELEPHONE (OUTPATIENT)
Dept: FAMILY MEDICINE CLINIC | Age: 74
End: 2019-05-28

## 2019-05-28 DIAGNOSIS — M67.432 GANGLION CYST OF WRIST, LEFT: Primary | ICD-10-CM

## 2019-06-10 ENCOUNTER — OFFICE VISIT (OUTPATIENT)
Dept: ORTHOPEDIC SURGERY | Age: 74
End: 2019-06-10
Payer: MEDICARE

## 2019-06-10 VITALS — WEIGHT: 126.1 LBS | BODY MASS INDEX: 23.81 KG/M2 | HEIGHT: 61 IN

## 2019-06-10 DIAGNOSIS — R22.32 MASS OF LEFT WRIST: ICD-10-CM

## 2019-06-10 DIAGNOSIS — M65.4 DE QUERVAIN'S DISEASE (RADIAL STYLOID TENOSYNOVITIS): ICD-10-CM

## 2019-06-10 DIAGNOSIS — M25.532 LEFT WRIST PAIN: Primary | ICD-10-CM

## 2019-06-10 PROCEDURE — 99203 OFFICE O/P NEW LOW 30 MIN: CPT | Performed by: ORTHOPAEDIC SURGERY

## 2019-06-10 NOTE — PROGRESS NOTES
HISTORY OF PRESENT ILLNESS: The patient is a 35-year-old right-hand-dominant retired female who presents today for a new hand surgery specialty evaluation at the recommendation of Dr. Alaina Spann regarding her left wrist.  For several years she has noticed intermittent discomfort over the dorsal radial left wrist and a recently increasing sized prominence over the dorsal radial wrist.  At her primary care office she had an attempt with aspiration but she never had lasting relief. Now even lifting a small puppy has been very uncomfortable. PAST MEDICAL HISTORY: Patient's medications, allergies, past medical, surgical, social and family histories were reviewed and updated as appropriate. ROS: Pertinent items are noted in HPI. Review of systems reviewed from patient history form dated on 6/10/2019 and available in the patient's chart under the media tab. Denies fever, chills, confusion, bowel/bladder active change. PHYSICAL EXAMINATION: Examination reveals a pleasant individual in no acute distress. There appears to be satisfactory pain-free  range of motion, strength,and stability of the cervical spine, shoulders, and elbows. Skin is intact without lymphadenopathy, discoloration, or abnormal temperature. There is intact symmetric circulation in both upper extremities. Hand and digital range of motion is satisfactory bilaterally. Provocative testing for carpal tunnel syndrome is negative. Along the dorsal radial wrist there is an approximately 9 mm fullness consistent with likely ganglion cyst around the region of the first dorsal extensor compartment. There is tenderness over the radial wrist at the left first dorsal extensor compartment and a positive Finkelstein's test which reproduces symptoms.       DIAGNOSTIC TESTING:  3 views of the left wrist demonstrate no signs of ac gerry fracture or advanced degenerative change      IMPRESSION AND PLAN: The patient has a combination of painful left wrist de Quervain's tendinitis and persistent left wrist soft tissue mass, likely representing a ganglion cyst.  She has failed other conservative means to date. I have offered the patient a combination of outpatient left de Quervain's release along with excision of left wrist ganglion cyst, followed by early postop therapy. We discussed the risks, benefits, limitations, alternatives, and postop recovery following the proposed procedure. We will begin the process of scheduling surgery if there are no other preoperative medical contraindications.

## 2019-06-11 ENCOUNTER — OFFICE VISIT (OUTPATIENT)
Dept: FAMILY MEDICINE CLINIC | Age: 74
End: 2019-06-11
Payer: MEDICARE

## 2019-06-11 VITALS
RESPIRATION RATE: 16 BRPM | BODY MASS INDEX: 22.78 KG/M2 | DIASTOLIC BLOOD PRESSURE: 82 MMHG | OXYGEN SATURATION: 98 % | HEART RATE: 59 BPM | WEIGHT: 123.8 LBS | SYSTOLIC BLOOD PRESSURE: 128 MMHG | HEIGHT: 62 IN | TEMPERATURE: 99 F

## 2019-06-11 DIAGNOSIS — Z01.818 PRE-OP EXAM: Primary | ICD-10-CM

## 2019-06-11 DIAGNOSIS — M65.4 DE QUERVAIN'S DISEASE (RADIAL STYLOID TENOSYNOVITIS): ICD-10-CM

## 2019-06-11 PROCEDURE — 93000 ELECTROCARDIOGRAM COMPLETE: CPT | Performed by: NURSE PRACTITIONER

## 2019-06-11 PROCEDURE — 99213 OFFICE O/P EST LOW 20 MIN: CPT | Performed by: NURSE PRACTITIONER

## 2019-06-11 NOTE — PROGRESS NOTES
[facility]  [phone]  Fax: [phone]   Pre-operative History and Physical    Diann Siegel is a 68 y.o. female who is referred by Dr. Inocente Macias for a preoperative physical examination and medical clearance for surgery. She is scheduled to have Left Dequervains release, L wrist ganglion excision at [facility] on 6-17-19.     DIAGNOSIS:  Dequervains's disease        History Obtained From:  patient    HISTORY OF PRESENT ILLNESS:    The patient is a 68 y.o. female with significant past medical history of Dequervains disease who presents pre-op       Past Medical History:   Diagnosis Date    Allergic rhinitis     Hyperlipidemia     Hypertension     Migraine headache     since 20's    Parkinson disease ([facility]     Reactive airway disease     Sjogren's syndrome ([facility] [facility] 2012    Dr. Hunter Ferguson     Past Surgical History:   Procedure Laterality Date    BREAST SURGERY Left     COLONOSCOPY  09/15/10    Dr. Orquidea Jiménez  12/22/2016    HIP FRACTURE SURGERY Left     broke the femur    SINUS SURGERY  07/2015    TUBAL LIGATION  1972     Current Outpatient Medications   Medication Sig Dispense Refill    carbidopa-levodopa (PARCOPA)  MG TBDP Take 1 tablet by mouth 4 times daily 360 tablet 1    losartan (COZAAR) 50 MG tablet Take 1 tablet by mouth daily 90 tablet 1    denosumab (PROLIA) 60 MG/ML SOLN SC injection Inject 1 mL into the skin every 6 months 1 mL 0    Calcium Carbonate-Vitamin D (OYSTER SHELL CALCIUM/D) 500-200 MG-UNIT TABS TAKE 1 TABLET BY MOUTH ONCE DAILY 30 tablet 0    guaiFENesin (MUCINEX) 600 MG extended release tablet Take 1,200 mg by mouth 2 times daily      isosorbide mononitrate (IMDUR) 30 MG extended release tablet TAKE ONE TABLET BY MOUTH ONCE DAILY 90 tablet 0    metoprolol tartrate (LOPRESSOR) 25 MG tablet TAKE ONE TABLET BY MOUTH TWICE A DAY 60 tablet 11    amLODIPine (NORVASC) 5 MG tablet Take 5 mg by mouth daily      nitroGLYCERIN (NITROSTAT) 0.4 MG SL tablet Place 1 tablet under the tongue every 5 minutes as needed for Chest pain 25 tablet 3    Multiple Vitamins-Minerals (THERAPEUTIC MULTIVITAMIN-MINERALS) tablet Take 1 tablet by mouth daily      butalbital-acetaminophen-caffeine (FIORICET, ESGIC) -40 MG per tablet Take 1 tablet by mouth every 4 hours as needed for Headaches      montelukast (SINGULAIR) 10 MG tablet Take 10 mg by mouth nightly      meloxicam (MOBIC) 15 MG tablet Take 15 mg by mouth daily      lovastatin (MEVACOR) 20 MG tablet Take 1 tablet by mouth nightly 90 tablet 3    mometasone (NASONEX) 50 MCG/ACT nasal spray 2 sprays by Nasal route daily. Each nostril 3 g 3    PREVIDENT 5000 BOOSTER PLUS 1.1 % PSTE USE DAILY IN MOUTH. 100 mL 0    polyvinyl alcohol-povidone (HYPOTEARS) 1.4-0.6 % ophthalmic solution 1-2 drops as needed.  aspirin 81 MG tablet Take 81 mg by mouth daily. No current facility-administered medications for this visit. Allergies:  Ranolazine and Amoxicillin  History of allergic reaction to anesthesia:  No     Social History     Tobacco Use   Smoking Status Never Smoker   Smokeless Tobacco Never Used     The patient states she drinks 0 per week.     Family History   Problem Relation Age of Onset    Heart Attack Father     High Cholesterol Father     Heart Disease Father     High Cholesterol Mother        REVIEW OF SYSTEMS:    CONSTITUTIONAL:  negative  EYES:  negative  HEENT:  Negative, just dry mouth  RESPIRATORY:  negative  CARDIOVASCULAR:  negative  GASTROINTESTINAL:  negative  GENITOURINARY:  negative  INTEGUMENT/BREAST:  negative  HEMATOLOGIC/LYMPHATIC:  negative  ALLERGIC/IMMUNOLOGIC:  negative  ENDOCRINE:  negative  MUSCULOSKELETAL:  negative  NEUROLOGICAL:  negative    PHYSICAL EXAM:      /82   Pulse 59   Temp 99 °F (37.2 °C)   Resp 16   Ht 5' 2\" (1.575 m)   Wt 123 lb 12.8 oz (56.2 kg)   SpO2 98%   BMI 22.64 kg/m²     CONSTITUTIONAL:  awake, alert,

## 2019-06-12 ENCOUNTER — TELEPHONE (OUTPATIENT)
Dept: ORTHOPEDIC SURGERY | Age: 74
End: 2019-06-12

## 2019-06-12 DIAGNOSIS — M65.4 DE QUERVAIN'S DISEASE (RADIAL STYLOID TENOSYNOVITIS): ICD-10-CM

## 2019-06-12 DIAGNOSIS — R22.32 MASS OF LEFT WRIST: Primary | ICD-10-CM

## 2019-06-12 NOTE — TELEPHONE ENCOUNTER
Auth: NPR  Date: 6/17/19  Reference # None  Spoke with: None  Type of SX: Outpatient  Location: [facility]  CPT 68347, [id number]   SX area:  Lt wrist/Dequervain

## 2019-06-12 NOTE — PROGRESS NOTES
Lizz Cam    Age 68 y.o.    female    1945    MRN [id number]    Date___________   Arrival Time_____________  OR Time____________Duration____     Procedure(s):  LEFT DEQUERVAIN'S RELEASE  EXCISION LEFT WRIST GANGLION    Surgeon  ________________________________  [facility]   General   [place]       Phone [phone] (home) [phone] (work)      [address]  Cell Work  ______________________________________________________________________________________________________________________________________________________________________________________________________________________________________________________________________________________________________________________________________________________________    PCP__________________________Phone__________________________________      H&P__________________Bringing      Chart              Epic    DOS           Called_______  EKG__________________Bringing      Chart              Epic    DOS           Called_______  LAB__________________ Bringing      Chart              Epic    DOS           Called_______  CardiacClearance _______Bringing      Chart              Epic    DOS           Called_______      Cardiologist________________________ Phone___________________________      ? Judaism concerns / Waiver on Chart            [employer]________________  ? Pre-op Instructions Given [place]          _________________________________  ? Directions to Surgery Center                          _________________________________  ? Transportation Home_______________      _________________________________  ?  Crutches/Walker__________________        _________________________________      ________Pre-op Orders   _______Transcribed    _______Wt.  ________Pharmacy          _______SCD  ______VTE     ______Beta Blocker  ________Consent

## 2019-06-12 NOTE — PROGRESS NOTES
Obstructive Sleep Apnea (ORI) Screening     Patient:  Richmond Cui    YOB: 1945      Medical Record #:  [id number]                     Date:  6/12/2019     1. Are you a loud and/or regular snorer? []  Yes       [x] No    2. Have you been observed to gasp or stop breathing during sleep? []  Yes       [x] No    3. Do you feel tired or groggy upon awakening or do you awaken with a headache?           []  Yes       [] No    4. Are you often tired or fatigued during the wake time hours? []  Yes       [] No    5. Do you fall asleep sitting, reading, watching TV or driving? []  Yes       [] No    6. Do you often have problems with memory or concentration? []  Yes       [] No    **If patient's score is ? 3 they are considered high risk for ORI. Notify the anesthesiologist of the high risk and document in focus note. Note:  If the patient's BMI is more than 35 kg m¯² , has neck circumference > 40 cm, and/or high blood pressure the risk is greater (© American Sleep Apnea Association, 2006).

## 2019-06-14 ENCOUNTER — ANESTHESIA EVENT (OUTPATIENT)
Dept: OPERATING ROOM | Age: 74
End: 2019-06-14
Payer: MEDICARE

## 2019-06-16 NOTE — OP NOTE
[address] [facility]  Procedure Note  [address] [facility]  06/17/2019    PREOPERATIVE DIAGNOSIS    Left DeQuervains Tendinitis, Left Dorsal Radial Wrist Ganglion    POSTOPERATIVE DIAGNOSIS(ES)   Same     PROCEDURE   1. Left DeQuervains Release   2. Excision Left Dorsoradial Wrist Ganglion    SURGEON:  JAMIL SOTO    ANESTHESIA Local with MAC. COMPLICATIONS None. INDICATIONS FOR PROCEDURE  The patient has clinical evidence of DeQuervains tendinitis and a writ ganglion and has failed appropriate conservative management. The patient understands the relevant risks, benefits, limitations, and healing process of the procedure. PROCEDURE  The patient was brought to the operating room and anesthetized with local anesthetic and MAC sedation. The identified and marked extremity was then prepped and draped in a standard fashion. A well-padded tourniquet was applied to the upper arm. The arm was exsanguinated and the tourniquet elevated to 250 mmHg. A standard transverse incision was made at the level of the radial styloid. Dissection carried down through skin and subcutaneous tissue with careful attention paid to protecting cutaneous nerve branches. The incision was then separately dissected to allow for exposure of the area of prominence. A 9mm soft tissue solid prominence was then carefully excised. The first dorsal extensor compartment was then separately identified and incised longitudinally. After proximal release was performed, attention was directed distally. Under direct visualization the distal aspect was also released. The contents of the compartment were examined and any additional compartment for the extensor pollicis brevis released. The tendons themselves exhibited signs of chronic tendinosis. The tendinous structures were stable to gentle range of motion.       The tourniquet was released and hemostasis achieved with bipolar electrocautery. The wound was irrigated with sterile saline. Skin was closed with 4-0 monocryl and nylon suture. A post-op dressing was applied and the patient transported to the recovery area in stable condition with good perfusion to all fingertips.             Chaparrita Arcos

## 2019-06-17 ENCOUNTER — HOSPITAL ENCOUNTER (OUTPATIENT)
Age: 74
Setting detail: OUTPATIENT SURGERY
Discharge: HOME | End: 2019-06-17
Attending: ORTHOPAEDIC SURGERY | Admitting: ORTHOPAEDIC SURGERY
Payer: MEDICARE

## 2019-06-17 ENCOUNTER — ANESTHESIA (OUTPATIENT)
Dept: OPERATING ROOM | Age: 74
End: 2019-06-17
Payer: MEDICARE

## 2019-06-17 VITALS
OXYGEN SATURATION: 98 % | HEART RATE: 64 BPM | SYSTOLIC BLOOD PRESSURE: 148 MMHG | HEIGHT: 62 IN | RESPIRATION RATE: 16 BRPM | WEIGHT: 123 LBS | BODY MASS INDEX: 22.63 KG/M2 | DIASTOLIC BLOOD PRESSURE: 65 MMHG | TEMPERATURE: 99.1 F

## 2019-06-17 VITALS — DIASTOLIC BLOOD PRESSURE: 75 MMHG | OXYGEN SATURATION: 97 % | SYSTOLIC BLOOD PRESSURE: 145 MMHG

## 2019-06-17 DIAGNOSIS — M65.4 DE QUERVAIN'S TENOSYNOVITIS: ICD-10-CM

## 2019-06-17 DIAGNOSIS — R22.32 WRIST LUMP, LEFT: ICD-10-CM

## 2019-06-17 DIAGNOSIS — M65.4 DE QUERVAIN'S DISEASE (RADIAL STYLOID TENOSYNOVITIS): Primary | ICD-10-CM

## 2019-06-17 PROCEDURE — 3700000001 HC ADD 15 MINUTES (ANESTHESIA): Performed by: ORTHOPAEDIC SURGERY

## 2019-06-17 PROCEDURE — 7100000010 HC PHASE II RECOVERY - FIRST 15 MIN: Performed by: ORTHOPAEDIC SURGERY

## 2019-06-17 PROCEDURE — 3600000014 HC SURGERY LEVEL 4 ADDTL 15MIN: Performed by: ORTHOPAEDIC SURGERY

## 2019-06-17 PROCEDURE — 2580000003 HC RX 258: Performed by: ORTHOPAEDIC SURGERY

## 2019-06-17 PROCEDURE — 2500000003 HC RX 250 WO HCPCS: Performed by: NURSE ANESTHETIST, CERTIFIED REGISTERED

## 2019-06-17 PROCEDURE — 3700000000 HC ANESTHESIA ATTENDED CARE: Performed by: ORTHOPAEDIC SURGERY

## 2019-06-17 PROCEDURE — 7100000011 HC PHASE II RECOVERY - ADDTL 15 MIN: Performed by: ORTHOPAEDIC SURGERY

## 2019-06-17 PROCEDURE — 2709999900 HC NON-CHARGEABLE SUPPLY: Performed by: ORTHOPAEDIC SURGERY

## 2019-06-17 PROCEDURE — 2580000003 HC RX 258: Performed by: ANESTHESIOLOGY

## 2019-06-17 PROCEDURE — 6360000002 HC RX W HCPCS: Performed by: NURSE ANESTHETIST, CERTIFIED REGISTERED

## 2019-06-17 PROCEDURE — 3600000004 HC SURGERY LEVEL 4 BASE: Performed by: ORTHOPAEDIC SURGERY

## 2019-06-17 PROCEDURE — 2500000003 HC RX 250 WO HCPCS: Performed by: ORTHOPAEDIC SURGERY

## 2019-06-17 PROCEDURE — 88304 TISSUE EXAM BY PATHOLOGIST: CPT

## 2019-06-17 RX ORDER — MAGNESIUM HYDROXIDE 1200 MG/15ML
LIQUID ORAL CONTINUOUS PRN
Status: COMPLETED | OUTPATIENT
Start: 2019-06-17 | End: 2019-06-17

## 2019-06-17 RX ORDER — SODIUM CHLORIDE, SODIUM LACTATE, POTASSIUM CHLORIDE, CALCIUM CHLORIDE 600; 310; 30; 20 MG/100ML; MG/100ML; MG/100ML; MG/100ML
INJECTION, SOLUTION INTRAVENOUS CONTINUOUS
Status: DISCONTINUED | OUTPATIENT
Start: 2019-06-17 | End: 2019-06-17 | Stop reason: HOSPADM

## 2019-06-17 RX ORDER — HYDROCODONE BITARTRATE AND ACETAMINOPHEN 5; 325 MG/1; MG/1
1 TABLET ORAL EVERY 6 HOURS PRN
Qty: 10 TABLET | Refills: 0 | Status: SHIPPED | OUTPATIENT
Start: 2019-06-17 | End: 2019-06-24

## 2019-06-17 RX ORDER — PROPOFOL 10 MG/ML
INJECTION, EMULSION INTRAVENOUS PRN
Status: DISCONTINUED | OUTPATIENT
Start: 2019-06-17 | End: 2019-06-17 | Stop reason: SDUPTHER

## 2019-06-17 RX ORDER — LIDOCAINE HYDROCHLORIDE 20 MG/ML
INJECTION, SOLUTION INFILTRATION; PERINEURAL PRN
Status: DISCONTINUED | OUTPATIENT
Start: 2019-06-17 | End: 2019-06-17 | Stop reason: SDUPTHER

## 2019-06-17 RX ADMIN — LIDOCAINE HYDROCHLORIDE 60 MG: 20 INJECTION, SOLUTION INFILTRATION; PERINEURAL at 10:22

## 2019-06-17 RX ADMIN — SODIUM CHLORIDE, POTASSIUM CHLORIDE, SODIUM LACTATE AND CALCIUM CHLORIDE: 600; 310; 30; 20 INJECTION, SOLUTION INTRAVENOUS at 09:33

## 2019-06-17 RX ADMIN — PROPOFOL 40 MG: 10 INJECTION, EMULSION INTRAVENOUS at 10:22

## 2019-06-17 ASSESSMENT — PULMONARY FUNCTION TESTS
PIF_VALUE: 1
PIF_VALUE: 0
PIF_VALUE: 1
PIF_VALUE: 0
PIF_VALUE: 1
PIF_VALUE: 1
PIF_VALUE: 0
PIF_VALUE: 1
PIF_VALUE: 1
PIF_VALUE: 0
PIF_VALUE: 0
PIF_VALUE: 1
PIF_VALUE: 0
PIF_VALUE: 0
PIF_VALUE: 1

## 2019-06-17 ASSESSMENT — PAIN SCALES - GENERAL: PAINLEVEL_OUTOF10: 0

## 2019-06-17 NOTE — ANESTHESIA POSTPROCEDURE EVALUATION
Department of Anesthesiology  Postprocedure Note    Patient: Jolanta Helm  MRN: [id number]  YOB: 1945  Date of evaluation: 6/17/2019    Procedure Summary     Date:  06/17/19 Room / Location:  [facility] OR [facility] OR    Anesthesia Start:  1017 Anesthesia Stop:  6027    Procedures:       LEFT DEQUERVAIN'S RELEASE (Left Hand)      EXCISION LEFT WRIST GANGLION (Left Hand) Diagnosis:       De Quervain's tenosynovitis      Wrist lump, left      (LEFT DEQUERVAIN'S TENOSYNOVITIS, LEFT WRIST GANGLION)    Surgeon:  Charlcie Leyden, MD Responsible Provider:  Magaly Freeman MD    Anesthesia Type:  MAC, TIVA ASA Status:  3        Anesthesia Type: MAC, TIVA    Daja Phase I: Daja Score: 10    Daja Phase II: Daja Score: 10    Last vitals: Reviewed and per EMR flowsheets.        Anesthesia Post Evaluation   Anesthetic Problems: no   Cardiovascular System Stable: yes  Respiratory Function: Airway Patent yes  ETT no  Ventilator no  Level of consciousness: awake, alert and oriented  Post-op pain: adequate analgesia  Hydration Adequate: yes  Nausea/Vomiting:no  Other Issues:     Yvonne Boss MD

## 2019-06-17 NOTE — ANESTHESIA PRE PROCEDURE
Department of Anesthesiology  Preprocedure Note       Name:  Ifeoma Croft   Age:  68 y.o.  :  1945                                          MRN:  [id number]         Date:  2019      Surgeon:  Lida Longoria MD    Procedure: LEFT DEQUERVAIN'S RELEASE (Left Hand); EXCISION LEFT WRIST GANGLION (Left Hand)    HPI:  The patient is a 63-year-old right-hand-dominant retired female who has pain in her left wrist.  For several years she has noticed intermittent discomfort over the dorsal radial left wrist and a recently increasing sized prominence over the dorsal radial wrist.  At her primary care office she had an attempt with aspiration but she never had lasting relief.   Now even lifting a small puppy has been very uncomfortable     Medications prior to admission:    carbidopa-levodopa (PARCOPA)  MG TBDP Take 1 tablet by mouth 4 times daily   losartan (COZAAR) 50 MG tablet Take 1 tablet by mouth daily   denosumab (PROLIA) 60 MG/ML SOLN SC injection Inject 1 mL into the skin every 6 months   Calcium Carbonate-Vitamin D (OYSTER SHELL CALCIUM/D) 500-200 MG-UNIT TABS TAKE 1 TABLET BY MOUTH ONCE DAILY   guaiFENesin (MUCINEX) 600 MG extended release tablet Take 1,200 mg by mouth as needed    isosorbide mononitrate (IMDUR) 30 MG extended release tablet TAKE ONE TABLET BY MOUTH ONCE DAILY   metoprolol tartrate (LOPRESSOR) 25 MG tablet TAKE ONE TABLET BY MOUTH TWICE A DAY   amLODIPine (NORVASC) 5 MG tablet Take 5 mg by mouth daily   nitroGLYCERIN (NITROSTAT) 0.4 MG SL tablet Place 1 tablet under the tongue every 5 minutes as needed for Chest pain   Multiple Vitamins-Minerals (THERAPEUTIC MULTIVITAMIN-MINERALS) tablet Take 1 tablet by mouth daily   butalbital-acetaminophen-caffeine (FIORICET, ESGIC) -40 MG per tablet Take 1 tablet by mouth every 4 hours as needed for Headaches   montelukast (SINGULAIR) 10 MG tablet Take 10 mg by mouth nightly   meloxicam (MOBIC) 15 MG tablet Take 15 mg by mouth daily   lovastatin (MEVACOR) 20 MG tablet Take 1 tablet by mouth nightly   mometasone (NASONEX) 50 MCG/ACT nasal spray 2 sprays by Nasal route daily. Each nostril  Patient taking differently: 2 sprays by Nasal route as needed Each nostril   PREVIDENT 5000 BOOSTER PLUS 1.1 % PSTE USE DAILY IN MOUTH.   polyvinyl alcohol-povidone (HYPOTEARS) 1.4-0.6 % ophthalmic solution 1-2 drops as needed. aspirin 81 MG tablet Take 81 mg by mouth daily.      Allergies:     Ranolazine Other (See Comments)     Dizziness, confusion, constipation, nausea and agitation    Amoxicillin Rash and Swelling     Problem List:     Hypertension    Hyperlipidemia    Parkinson disease ([address]    Sjogren's syndrome ([address]    Allergic rhinitis    Reactive airway disease    Chronic rhinitis    Elevated blood protein    Precordial pain    MAE (dyspnea on exertion)    Unstable angina (HCC)    Coronary artery disease of native artery with stable angina pectoris (HCC)    Hip fracture, left, closed, initial encounter ([facility]    Osteopenia    Mass of left wrist    De Quervain's disease (radial styloid tenosynovitis)     Past Medical History:     Allergic rhinitis     Arthritis     Hyperlipidemia     Hypertension     Migraine headache     since 20's    Parkinson disease ([address]     Reactive airway disease     Sjogren's syndrome ([address] 2012    Dr. Lio Calvo     Past Surgical History:     BREAST SURGERY Left     COLONOSCOPY  09/15/10    Dr. Syed Paniagua  12/22/2016    HIP FRACTURE SURGERY Left     broke the femur    SINUS SURGERY  07/2015    TUBAL LIGATION  1972     Social History:     Smoking status: Never Smoker    Smokeless tobacco: Never Used   Substance Use Topics    Alcohol use: No     Vital Signs (Current):   T 98.4  ZP 62 R 16  /73  SpO2 99%  Height: 5' 2\" (1.575 m)  (06/12/19) Weight: 123 lb (55.8 kg)  (06/12/19)   BMI: 22.5           BP Readings from Last 3 Encounters:   06/11/19 128/82   05/07/19 138/74   03/15/19 130/77     NPO Status: > 8 hrs    CBC:    HGB 14.1 01/22/2019    HCT 41.9 01/22/2019     01/22/2019     CMP:     01/22/2019    K 4.3 01/22/2019     01/22/2019    CO2 27 01/22/2019    BUN 17 01/22/2019    CREATININE 0.8 01/22/2019    GLUCOSE 98 01/22/2019    PROT 8.7 01/22/2019    CALCIUM 9.4 01/22/2019    BILITOT 0.6 01/22/2019    ALKPHOS 74 01/22/2019    AST 25 01/22/2019    ALT 6 01/22/2019     Anesthesia Evaluation  Patient summary reviewed and Nursing notes reviewed  Airway: Mallampati: III  TM distance: >3 FB   Neck ROM: full  Mouth opening: > = 3 FB Dental:    (+) upper dentures and partials      Pulmonary:   (+) asthma: allergic asthma,     (-) sleep apnea                           Cardiovascular:  Exercise tolerance: good (>4 METS),   (+) hypertension:, CAD: non-obstructive, hyperlipidemia    (-)  angina and  MAE                Neuro/Psych:   (+) neuromuscular disease: Parkinson's disease, headaches: migraine headaches,    (-) seizures, TIA and CVA           GI/Hepatic/Renal:        (-) no renal disease       Endo/Other:    (+) : arthritis:., .    (-) diabetes mellitus, hypothyroidism               Abdominal:           Vascular:     - DVT and PE. Anesthesia Plan      MAC and TIVA     ASA 3       Induction: intravenous. MIPS: Prophylactic antiemetics administered. Anesthetic plan and risks discussed with patient and child/children. Plan discussed with CRNA.         Kerline Wang MD

## 2019-06-21 RX ORDER — MONTELUKAST SODIUM 10 MG/1
10 TABLET ORAL NIGHTLY
Qty: 30 TABLET | Refills: 1 | Status: SHIPPED | OUTPATIENT
Start: 2019-06-21 | End: 2019-10-11 | Stop reason: SDUPTHER

## 2019-06-21 RX ORDER — AMLODIPINE BESYLATE 5 MG/1
5 TABLET ORAL DAILY
Qty: 30 TABLET | Refills: 1 | Status: SHIPPED | OUTPATIENT
Start: 2019-06-21 | End: 2019-12-03 | Stop reason: SDUPTHER

## 2019-06-21 RX ORDER — ISOSORBIDE MONONITRATE 30 MG/1
TABLET, EXTENDED RELEASE ORAL
Qty: 90 TABLET | Refills: 0 | Status: SHIPPED | OUTPATIENT
Start: 2019-06-21 | End: 2019-10-11 | Stop reason: SDUPTHER

## 2019-06-21 RX ORDER — LOVASTATIN 20 MG/1
20 TABLET ORAL NIGHTLY
Qty: 90 TABLET | Refills: 0 | Status: SHIPPED | OUTPATIENT
Start: 2019-06-21 | End: 2019-09-12 | Stop reason: SDUPTHER

## 2019-06-21 NOTE — TELEPHONE ENCOUNTER
Requested Prescriptions     Pending Prescriptions Disp Refills    isosorbide mononitrate (IMDUR) 30 MG extended release tablet 90 tablet 0    montelukast (SINGULAIR) 10 MG tablet 30 tablet      Sig: Take 1 tablet by mouth nightly    amLODIPine (NORVASC) 5 MG tablet 30 tablet      Sig: Take 1 tablet by mouth daily    lovastatin (MEVACOR) 20 MG tablet 90 tablet 3     Sig: Take 1 tablet by mouth nightly   Pt's pharmacy is also asking for refill on flonase/I did not see this on her list though. [facility]. They are aware that Dr. Nolan Carbajal is gone for today and will get this Monday.

## 2019-06-25 ENCOUNTER — TELEPHONE (OUTPATIENT)
Dept: FAMILY MEDICINE CLINIC | Age: 74
End: 2019-06-25

## 2019-06-27 ENCOUNTER — OFFICE VISIT (OUTPATIENT)
Dept: FAMILY MEDICINE CLINIC | Age: 74
End: 2019-06-27
Payer: MEDICARE

## 2019-06-27 ENCOUNTER — HOSPITAL ENCOUNTER (OUTPATIENT)
Dept: GENERAL RADIOLOGY | Age: 74
Discharge: HOME | End: 2019-06-27
Payer: MEDICARE

## 2019-06-27 VITALS
TEMPERATURE: 98.3 F | SYSTOLIC BLOOD PRESSURE: 138 MMHG | HEART RATE: 66 BPM | WEIGHT: 120 LBS | DIASTOLIC BLOOD PRESSURE: 70 MMHG | BODY MASS INDEX: 21.95 KG/M2 | OXYGEN SATURATION: 98 %

## 2019-06-27 DIAGNOSIS — R05.3 CHRONIC COUGH: ICD-10-CM

## 2019-06-27 DIAGNOSIS — R05.3 CHRONIC COUGH: Primary | ICD-10-CM

## 2019-06-27 PROCEDURE — 99214 OFFICE O/P EST MOD 30 MIN: CPT | Performed by: FAMILY MEDICINE

## 2019-06-27 PROCEDURE — 71046 X-RAY EXAM CHEST 2 VIEWS: CPT

## 2019-06-27 RX ORDER — GUAIFENESIN AND CODEINE PHOSPHATE 100; 10 MG/5ML; MG/5ML
5 SOLUTION ORAL 2 TIMES DAILY PRN
Qty: 120 ML | Refills: 0 | Status: SHIPPED | OUTPATIENT
Start: 2019-06-27 | End: 2019-06-30

## 2019-06-27 RX ORDER — DOXYCYCLINE HYCLATE 100 MG
100 TABLET ORAL 2 TIMES DAILY
Qty: 20 TABLET | Refills: 0 | Status: SHIPPED | OUTPATIENT
Start: 2019-06-27 | End: 2019-07-07

## 2019-06-27 RX ORDER — METHYLPREDNISOLONE 4 MG/1
TABLET ORAL
Qty: 1 KIT | Refills: 0 | Status: SHIPPED | OUTPATIENT
Start: 2019-06-27 | End: 2019-08-22

## 2019-06-28 ENCOUNTER — OFFICE VISIT (OUTPATIENT)
Dept: ORTHOPEDIC SURGERY | Age: 74
End: 2019-06-28

## 2019-06-28 DIAGNOSIS — M65.4 DE QUERVAIN'S DISEASE (RADIAL STYLOID TENOSYNOVITIS): Primary | ICD-10-CM

## 2019-06-28 PROCEDURE — 99024 POSTOP FOLLOW-UP VISIT: CPT | Performed by: ORTHOPAEDIC SURGERY

## 2019-06-28 RX ORDER — HYDROCODONE BITARTRATE AND ACETAMINOPHEN 5; 325 MG/1; MG/1
TABLET ORAL
Refills: 0 | COMMUNITY
Start: 2019-06-25 | End: 2019-08-22

## 2019-06-28 NOTE — PROGRESS NOTES
Chief Complaint:  Post-Op Check (PO CK LT DQ RELEASE 6/17/19)      History of Present of Illness: The patient reports tremendous improvement after her recent de Quervain's release. She reports that she has been back to even doing some david work. Review of Systems  Pertinent items are noted in HPI  Denies fever, chills, confusion, bowel/bladder active change. Review of systems reviewed from Patient History Form dated on 6/10/2019 and available in the patient's chart under the Media tab. Examination:  The incision is healing nicely and she demonstrates full range of motion. There is mild postop swelling but no sign of infection. Radiology:     X-rays obtained and reviewed in office:  Views    Location    Impression      No orders of the defined types were placed in this encounter. Impression:  Encounter Diagnosis   Name Primary?  De Quervain's disease (radial styloid tenosynovitis) Yes         Treatment Plan: Today we will go ahead and remove sutures and apply Steri-Strips. She may advance her activities as the comfort allows. I will see her back as needed    Please note that this transcription was created using voice recognition software. Any errors are unintentional and may be due to voice recognition transcription.

## 2019-07-11 ASSESSMENT — ENCOUNTER SYMPTOMS
SORE THROAT: 0
SHORTNESS OF BREATH: 0
WHEEZING: 0
COUGH: 1

## 2019-07-11 NOTE — PROGRESS NOTES
2019     Diamond Gandhi (:  1945) is a 68 y.o. female, here for evaluation of the following medical concerns: Diamond Gandhi is a 68 y.o. female. Patient presents with:  Cough  Nausea        The patients PMH, surgical history, family history, medications, allergies were all reviewed and updated as appropriate today. Cough   This is a chronic problem. The current episode started more than 1 month ago. The problem has been waxing and waning. The problem occurs every few minutes. The cough is productive of sputum. Pertinent negatives include no chest pain, chills, ear congestion, ear pain, fever, headaches, sore throat, shortness of breath, sweats, weight loss or wheezing. Nothing aggravates the symptoms. She has tried nothing for the symptoms. The treatment provided no relief. There is no history of asthma or COPD. Review of Systems   Constitutional: Negative for chills, fever and weight loss. HENT: Negative for ear pain and sore throat. Respiratory: Positive for cough. Negative for shortness of breath and wheezing. Cardiovascular: Negative for chest pain. Neurological: Negative for headaches. Prior to Visit Medications    Medication Sig Taking?  Authorizing Provider   methylPREDNISolone (MEDROL, MAULIK,) 4 MG tablet Take by mouth as directed Yes Garland Neil MD   metoprolol tartrate (LOPRESSOR) 25 MG tablet TAKE ONE TABLET BY MOUTH TWICE A DAY Yes Garland Neil MD   isosorbide mononitrate (IMDUR) 30 MG extended release tablet TAKE ONE TABLET BY MOUTH ONCE DAILY Yes HIRAM Franco CNP   montelukast (SINGULAIR) 10 MG tablet Take 1 tablet by mouth nightly Yes HIRAM Franco CNP   amLODIPine (NORVASC) 5 MG tablet Take 1 tablet by mouth daily Yes HIRAM Franco CNP   lovastatin (MEVACOR) 20 MG tablet Take 1 tablet by mouth nightly Yes HIRAM Franco CNP   carbidopa-levodopa (PARCOPA)  MG TBDP Take 1 tablet by mouth 4 times daily Yes Brigid Garcia MD   losartan (COZAAR) 50 MG tablet Take 1 tablet by mouth daily Yes Brigid Garcia MD   denosumab (PROLIA) 60 MG/ML SOLN SC injection Inject 1 mL into the skin every 6 months Yes Brigid Garcia MD   Calcium Carbonate-Vitamin D (OYSTER SHELL CALCIUM/D) 500-200 MG-UNIT TABS TAKE 1 TABLET BY MOUTH ONCE DAILY Yes HIRAM Powers - CNP   guaiFENesin (MUCINEX) 600 MG extended release tablet Take 1,200 mg by mouth as needed  Yes Historical Provider, MD   nitroGLYCERIN (NITROSTAT) 0.4 MG SL tablet Place 1 tablet under the tongue every 5 minutes as needed for Chest pain Yes Lauri Higuera MD   Multiple Vitamins-Minerals (THERAPEUTIC MULTIVITAMIN-MINERALS) tablet Take 1 tablet by mouth daily Yes Historical Provider, MD   butalbital-acetaminophen-caffeine (FIORICET, ESGIC) -40 MG per tablet Take 1 tablet by mouth every 4 hours as needed for Headaches Yes Historical Provider, MD   meloxicam (MOBIC) 15 MG tablet Take 15 mg by mouth daily Yes Historical Provider, MD   mometasone (NASONEX) 50 MCG/ACT nasal spray 2 sprays by Nasal route daily. Each nostril  Patient taking differently: 2 sprays by Nasal route as needed Each nostril Yes Isak Carlin MD   PREVIDENT 5000 BOOSTER PLUS 1.1 % PSTE USE DAILY IN MOUTH. Yes Isak Carlin MD   polyvinyl alcohol-povidone (HYPOTEARS) 1.4-0.6 % ophthalmic solution 1-2 drops as needed. Yes Historical Provider, MD   aspirin 81 MG tablet Take 81 mg by mouth daily.  Yes Historical Provider, MD   HYDROcodone-acetaminophen (Arminda Dieter) 5-325 MG per tablet TAKE 1 TABLET BY MOUTH EVERY 6 HOURS AS NEEDED FOR PAIN FOR UP TO 7 DAYS REDUCE DOSES TAKEN AS PAIN BECOMES MANAGEABLE  Historical Provider, MD        Social History     Tobacco Use    Smoking status: Never Smoker    Smokeless tobacco: Never Used   Substance Use Topics    Alcohol use: No        Vitals:    06/27/19 1115   BP: 138/70   Pulse: 66   Temp: 98.3 °F (36.8 °C)   TempSrc: Oral   SpO2: 98%

## 2019-08-22 ENCOUNTER — OFFICE VISIT (OUTPATIENT)
Dept: ORTHOPEDIC SURGERY | Age: 74
End: 2019-08-22
Payer: MEDICARE

## 2019-08-22 VITALS — WEIGHT: 119.93 LBS | HEIGHT: 62 IN | BODY MASS INDEX: 22.07 KG/M2

## 2019-08-22 DIAGNOSIS — Z96.642 HISTORY OF HEMIARTHROPLASTY OF LEFT HIP: Primary | ICD-10-CM

## 2019-08-22 DIAGNOSIS — M25.552 PAIN OF LEFT HIP JOINT: ICD-10-CM

## 2019-08-22 PROCEDURE — 99213 OFFICE O/P EST LOW 20 MIN: CPT | Performed by: ORTHOPAEDIC SURGERY

## 2019-08-22 NOTE — PROGRESS NOTES
Fear of current or ex partner: None     Emotionally abused: None     Physically abused: None     Forced sexual activity: None   Other Topics Concern    None   Social History Narrative    None     Current Outpatient Medications   Medication Sig Dispense Refill    metoprolol tartrate (LOPRESSOR) 25 MG tablet TAKE ONE TABLET BY MOUTH TWICE A DAY 60 tablet 11    isosorbide mononitrate (IMDUR) 30 MG extended release tablet TAKE ONE TABLET BY MOUTH ONCE DAILY 90 tablet 0    montelukast (SINGULAIR) 10 MG tablet Take 1 tablet by mouth nightly 30 tablet 1    amLODIPine (NORVASC) 5 MG tablet Take 1 tablet by mouth daily 30 tablet 1    lovastatin (MEVACOR) 20 MG tablet Take 1 tablet by mouth nightly 90 tablet 0    carbidopa-levodopa (PARCOPA)  MG TBDP Take 1 tablet by mouth 4 times daily 360 tablet 1    losartan (COZAAR) 50 MG tablet Take 1 tablet by mouth daily 90 tablet 1    denosumab (PROLIA) 60 MG/ML SOLN SC injection Inject 1 mL into the skin every 6 months 1 mL 0    Calcium Carbonate-Vitamin D (OYSTER SHELL CALCIUM/D) 500-200 MG-UNIT TABS TAKE 1 TABLET BY MOUTH ONCE DAILY 30 tablet 0    guaiFENesin (MUCINEX) 600 MG extended release tablet Take 1,200 mg by mouth as needed       nitroGLYCERIN (NITROSTAT) 0.4 MG SL tablet Place 1 tablet under the tongue every 5 minutes as needed for Chest pain 25 tablet 3    Multiple Vitamins-Minerals (THERAPEUTIC MULTIVITAMIN-MINERALS) tablet Take 1 tablet by mouth daily      butalbital-acetaminophen-caffeine (FIORICET, ESGIC) -40 MG per tablet Take 1 tablet by mouth every 4 hours as needed for Headaches      mometasone (NASONEX) 50 MCG/ACT nasal spray 2 sprays by Nasal route daily. Each nostril (Patient taking differently: 2 sprays by Nasal route as needed Each nostril) 3 g 3    PREVIDENT 5000 BOOSTER PLUS 1.1 % PSTE USE DAILY IN MOUTH. 100 mL 0    polyvinyl alcohol-povidone (HYPOTEARS) 1.4-0.6 % ophthalmic solution 1-2 drops as needed.       aspirin 81 MG of arthritis. Patient realizes the magnitude of this type of treatment as well as having voiced a general understanding to the duration of the prosthesis. The patient voiced understanding to these continuum of treatment options. Patient does have symptoms consistent with hip pathology. I believe she is developing arthritis of her acetabulum. She is unsure at this time whether she has enough pain to consider revision surgery. She will follow-up in 4 weeks. Over the next 4 weeks she will consider if she has enough pain to consider revision to total hip arthroplasty. She will also take care of her extensive dental issues including but not limited to dental caries and gingivitis with gum wasting.

## 2019-08-23 RX ORDER — LOSARTAN POTASSIUM 50 MG/1
50 TABLET ORAL DAILY
Qty: 90 TABLET | Refills: 3 | Status: SHIPPED | OUTPATIENT
Start: 2019-08-23 | End: 2019-08-27 | Stop reason: SDUPTHER

## 2019-08-27 RX ORDER — LOSARTAN POTASSIUM 50 MG/1
50 TABLET ORAL DAILY
Qty: 90 TABLET | Refills: 3 | Status: SHIPPED | OUTPATIENT
Start: 2019-08-27 | End: 2020-06-15

## 2019-08-28 ENCOUNTER — TELEPHONE (OUTPATIENT)
Dept: FAMILY MEDICINE CLINIC | Age: 74
End: 2019-08-28

## 2019-09-12 RX ORDER — LOVASTATIN 20 MG/1
20 TABLET ORAL NIGHTLY
Qty: 90 TABLET | Refills: 3 | Status: SHIPPED | OUTPATIENT
Start: 2019-09-12 | End: 2020-06-15

## 2019-10-24 ENCOUNTER — OFFICE VISIT (OUTPATIENT)
Dept: ORTHOPEDIC SURGERY | Age: 74
End: 2019-10-24
Payer: MEDICARE

## 2019-10-24 VITALS — BODY MASS INDEX: 22.07 KG/M2 | WEIGHT: 119.93 LBS | HEIGHT: 62 IN

## 2019-10-24 DIAGNOSIS — T84.84XA PAINFUL ORTHOPAEDIC HARDWARE (HCC): Primary | ICD-10-CM

## 2019-10-24 DIAGNOSIS — Z96.642 HISTORY OF HEMIARTHROPLASTY OF LEFT HIP: ICD-10-CM

## 2019-10-24 DIAGNOSIS — M25.552 LEFT HIP PAIN: ICD-10-CM

## 2019-10-24 PROCEDURE — 99213 OFFICE O/P EST LOW 20 MIN: CPT | Performed by: PHYSICIAN ASSISTANT

## 2019-10-25 ENCOUNTER — OFFICE VISIT (OUTPATIENT)
Dept: FAMILY MEDICINE CLINIC | Age: 74
End: 2019-10-25
Payer: MEDICARE

## 2019-10-25 VITALS
HEART RATE: 65 BPM | DIASTOLIC BLOOD PRESSURE: 82 MMHG | BODY MASS INDEX: 23.59 KG/M2 | WEIGHT: 129 LBS | OXYGEN SATURATION: 96 % | RESPIRATION RATE: 16 BRPM | SYSTOLIC BLOOD PRESSURE: 110 MMHG

## 2019-10-25 DIAGNOSIS — M54.50 LOW BACK PAIN, UNSPECIFIED BACK PAIN LATERALITY, UNSPECIFIED CHRONICITY, UNSPECIFIED WHETHER SCIATICA PRESENT: ICD-10-CM

## 2019-10-25 DIAGNOSIS — B02.9 HERPES ZOSTER WITHOUT COMPLICATION: Primary | ICD-10-CM

## 2019-10-25 LAB
BILIRUBIN, POC: NORMAL
BLOOD URINE, POC: NORMAL
CLARITY, POC: CLEAR
COLOR, POC: YELLOW
GLUCOSE URINE, POC: NORMAL
KETONES, POC: NORMAL
LEUKOCYTE EST, POC: NORMAL
NITRITE, POC: NORMAL
PH, POC: 6
PROTEIN, POC: NORMAL
SPECIFIC GRAVITY, POC: 1.02
UROBILINOGEN, POC: 0.2

## 2019-10-25 PROCEDURE — 81002 URINALYSIS NONAUTO W/O SCOPE: CPT | Performed by: NURSE PRACTITIONER

## 2019-10-25 PROCEDURE — 99213 OFFICE O/P EST LOW 20 MIN: CPT | Performed by: NURSE PRACTITIONER

## 2019-10-25 RX ORDER — VALACYCLOVIR HYDROCHLORIDE 1 G/1
1000 TABLET, FILM COATED ORAL 3 TIMES DAILY
Qty: 21 TABLET | Refills: 0 | Status: SHIPPED | OUTPATIENT
Start: 2019-10-25 | End: 2019-11-01

## 2019-10-25 ASSESSMENT — ENCOUNTER SYMPTOMS
BACK PAIN: 1
GASTROINTESTINAL NEGATIVE: 1
RESPIRATORY NEGATIVE: 1

## 2019-10-28 ENCOUNTER — TELEPHONE (OUTPATIENT)
Dept: FAMILY MEDICINE CLINIC | Age: 74
End: 2019-10-28

## 2019-10-28 ENCOUNTER — TELEPHONE (OUTPATIENT)
Dept: ORTHOPEDIC SURGERY | Age: 74
End: 2019-10-28

## 2019-12-13 DIAGNOSIS — G20 PARKINSON DISEASE (HCC): ICD-10-CM

## 2019-12-23 ENCOUNTER — HOSPITAL ENCOUNTER (OUTPATIENT)
Dept: NUCLEAR MEDICINE | Age: 74
Discharge: HOME | End: 2019-12-23
Payer: MEDICARE

## 2019-12-23 ENCOUNTER — HOSPITAL ENCOUNTER (OUTPATIENT)
Age: 74
Discharge: HOME | End: 2019-12-23
Payer: MEDICARE

## 2019-12-23 DIAGNOSIS — T84.84XA PAINFUL ORTHOPAEDIC HARDWARE (HCC): ICD-10-CM

## 2019-12-23 DIAGNOSIS — M25.552 LEFT HIP PAIN: ICD-10-CM

## 2019-12-23 DIAGNOSIS — Z96.642 HISTORY OF HEMIARTHROPLASTY OF LEFT HIP: ICD-10-CM

## 2019-12-23 LAB
BASOPHILS ABSOLUTE: 0 K/UL (ref 0–0.2)
BASOPHILS RELATIVE PERCENT: 0.5 %
C-REACTIVE PROTEIN: 0.5 MG/L (ref 0–5.1)
EOSINOPHILS ABSOLUTE: 0 K/UL (ref 0–0.6)
EOSINOPHILS RELATIVE PERCENT: 1 %
HCT VFR BLD CALC: 37.2 % (ref 36–48)
HEMOGLOBIN: 12.8 G/DL (ref 12–16)
LYMPHOCYTES ABSOLUTE: 1.3 K/UL (ref 1–5.1)
LYMPHOCYTES RELATIVE PERCENT: 36.5 %
MCH RBC QN AUTO: 34.3 PG (ref 26–34)
MCHC RBC AUTO-ENTMCNC: 34.3 G/DL (ref 31–36)
MCV RBC AUTO: 100 FL (ref 80–100)
MONOCYTES ABSOLUTE: 0.4 K/UL (ref 0–1.3)
MONOCYTES RELATIVE PERCENT: 11.7 %
NEUTROPHILS ABSOLUTE: 1.8 K/UL (ref 1.7–7.7)
NEUTROPHILS RELATIVE PERCENT: 50.3 %
PDW BLD-RTO: 13.1 % (ref 12.4–15.4)
PLATELET # BLD: 186 K/UL (ref 135–450)
PMV BLD AUTO: 7.5 FL (ref 5–10.5)
RBC # BLD: 3.72 M/UL (ref 4–5.2)
SEDIMENTATION RATE, ERYTHROCYTE: 71 MM/HR (ref 0–30)
WBC # BLD: 3.6 K/UL (ref 4–11)

## 2019-12-23 PROCEDURE — 36415 COLL VENOUS BLD VENIPUNCTURE: CPT

## 2019-12-23 PROCEDURE — 86140 C-REACTIVE PROTEIN: CPT

## 2019-12-23 PROCEDURE — 85025 COMPLETE CBC W/AUTO DIFF WBC: CPT

## 2019-12-23 PROCEDURE — A9503 TC99M MEDRONATE: HCPCS | Performed by: PHYSICIAN ASSISTANT

## 2019-12-23 PROCEDURE — 78315 BONE IMAGING 3 PHASE: CPT

## 2019-12-23 PROCEDURE — 85652 RBC SED RATE AUTOMATED: CPT

## 2019-12-23 PROCEDURE — 3430000000 HC RX DIAGNOSTIC RADIOPHARMACEUTICAL: Performed by: PHYSICIAN ASSISTANT

## 2019-12-23 RX ORDER — TC 99M MEDRONATE 20 MG/10ML
25.4 INJECTION, POWDER, LYOPHILIZED, FOR SOLUTION INTRAVENOUS
Status: COMPLETED | OUTPATIENT
Start: 2019-12-23 | End: 2019-12-23

## 2019-12-23 RX ADMIN — TC 99M MEDRONATE 25.4 MILLICURIE: 20 INJECTION, POWDER, LYOPHILIZED, FOR SOLUTION INTRAVENOUS at 09:10

## 2020-01-06 ENCOUNTER — OFFICE VISIT (OUTPATIENT)
Dept: ORTHOPEDIC SURGERY | Age: 75
End: 2020-01-06
Payer: MEDICARE

## 2020-01-06 VITALS — WEIGHT: 120 LBS | BODY MASS INDEX: 22.08 KG/M2 | HEIGHT: 62 IN

## 2020-01-06 PROCEDURE — 99214 OFFICE O/P EST MOD 30 MIN: CPT | Performed by: ORTHOPAEDIC SURGERY

## 2020-01-06 NOTE — PROGRESS NOTES
of left hip 08/22/2019    Pain of left hip joint 08/22/2019    Mass of left wrist 06/10/2019   Duard First Quervain's disease (radial styloid tenosynovitis) 06/10/2019    Osteopenia 03/01/2019    Hip fracture, left, closed, initial encounter ([facility] [address] 04/19/2018    Coronary artery disease of native artery with stable angina pectoris ([facility] [address] 01/12/2017    Precordial pain 12/19/2016    MAE (dyspnea on exertion) 12/19/2016    Chronic rhinitis 10/16/2013    Elevated blood protein 10/16/2013    Reactive airway disease     Hypertension     Hyperlipidemia     Parkinson disease ([facility] [address]     Sjogren's syndrome ([facility] [address]     Allergic rhinitis      Past Surgical History:   Procedure Laterality Date    BREAST SURGERY Left     COLONOSCOPY  09/15/10    Dr. Kitty Perez  12/22/2016    Geoffry Sos Left 6/17/2019    LEFT DEQUERVAIN'S RELEASE performed by Ronald Haskins MD at [address] / FINGER Left 6/17/2019    EXCISION LEFT WRIST GANGLION performed by Ronald Haskins MD at [address] Left     broke the femur    HIP SURGERY      JOINT REPLACEMENT      SINUS SURGERY  07/2015    TUBAL LIGATION  1972     Family History   Problem Relation Age of Onset    Heart Attack Father     High Cholesterol Father     Heart Disease Father     High Cholesterol Mother      Social History     Socioeconomic History    Marital status:      Spouse name: None    Number of children: None    Years of education: None    Highest education level: None   Occupational History    None   Social Needs    Financial resource strain: None    Food insecurity:     Worry: None     Inability: None    Transportation needs:     Medical: None     Non-medical: None   Tobacco Use    Smoking status: Never Smoker    Smokeless tobacco: Never Used   Substance and Sexual Activity    Alcohol use: No    Drug use: No    Sexual activity: Not Currently lesions. Palpation: Moderate tenderness to palpation over the greater trochanteric region. Range of Motion:  Painful range of motion of the hip with reproducible groin pain. Strength:  Strength is limited secondary to both pain and range of motion. Special Tests: There is a positive log roll maneuver. Negative Homans test.    Skin: There are no rashes, ulcerations or lesions. Gait: Antalgic favoring the right side    Reflex 2+ patellar    Additional Comments:     Examination of the right hip reveals intact skin. The patient demonstrates full painless range of motion with regards to flexion, abduction, internal and external rotation. There is no tenderness about the greater trochanter. There is a negative straight leg raise against resistance. Strength is 5/5 throughout all planes. Additional Examinations:         Right Upper Extremity:  Examination of the right upper extremity does not show any tenderness, deformity or injury. Range of motion is unremarkable. There is no gross instability. There are no rashes, ulcerations or lesions. Strength and tone are normal.  Left Upper Extremity: Examination of the left upper extremity does not show any tenderness, deformity or injury. Range of motion is unremarkable. There is no gross instability. There are no rashes, ulcerations or lesions. Strength and tone are normal.    Radiology:     X-rays obtained previously and reviewed in office:  Views 2  Location left hip  Impression no fractures or malalignment identified. There is a left hip hemiarthroplasty in place. Acute phase reactants  CRP-0.5  WBC-3.6  Sed rate-71    Bone scan results    No abnormal uptake associated with the left hip arthroplasty to suggest   arthroplasty complication. Failed Outpatient management or Previous Surgical Intervention  Patient has undergone conservative treatment of left hip for the past 1 year.   Patient has undergone therapy consisting of

## 2020-01-06 NOTE — LETTER
performance of any surgical or medical procedure(s). I am aware that the practice of surgery and medicine is not an exact science, and I acknowledge that no guarantees have been made to me concerning the results of the procedure(s). 5) I consent to the taking of photographs before, during and after the procedure(s) for scientific and educational purposes. I also understand that medical students and residents may participate in the procedure(s) set forth in Paragraph 1, and I consent to their participation under the supervision of the above named physician. 6) I consent to the administration of anesthesia and to the use of such anesthetics as may be deemed advisable by the anesthesiologist engaged to administer anesthesia. 7) I certify that I have read and understand this consent to the surgical or medical procedure(s); that all the information contained herein was disclosed to me by the informing physician prior to my signing; that all blanks or statements requiring insertions or completion were filled in and inapplicable paragraphs, if any, were stricken before I signed; and that all questions asked by me about the procedure(s) have been fully answered by the informing physician in a satisfactory manner.    ________________________________                           _______________________________  Signature of patient                                                                  Rin Hawkins M.D.  ________________________________                           ________________________________  Signature of Informing Physician                                           Informing Physician (Print)    If patient is unable to sign or is a minor, complete one of the following:   A) Patient is a minor ______________ years of age.    B) Patient is unable to sign the PT Evaluation and completed labs has been determined you will be called and set up for surgery. This may take 1-2 days to check results and return your phone call. You will not be called about lab results if everything is normal.      Please make sure you have not blocked our number. Noa Galindo will call from [phone] / [phone]. Also, please make sure your voice mail is not full.

## 2020-01-06 NOTE — LETTER
Attention    These are medical screening labs, not pre-admission surgery labs. Via Grzegorz Garcia M.D.  [phone]  [facility], [address] [facility]    Date:  2020    Name: Grady Ramachandran    : 1945    Please take this form with you.       THE FOLLOWING LABS NEED TO BE COMPLETED:    _x__UA  _x__URINE C/S (THIS NEEDS TO BE DONE EVEN IF THE UA IS NORMAL)  ____CBC W/ DIFF  _x__PT/INR  _x__PTT  _x__TRANSFERRIN  _x__ALBUMIN  _x__CHEM 7  _x__HEMAGLOBIN A1-C  ____OTHER: _____________________________________________                         Diagnosis:  LEFT HIP OSTEOARTHRITIS            RT KNEE OA M17.11      LT KNEE OA M17.12         RT HIP OA  M16.11         LT HIP OA M16.12         RT SHLD OA  M19.011   LT SHLD OA  M19.012             Z01.812  (Pre-op examination code)      2020 10:14 AM  Claudette Roblero PA-C

## 2020-01-07 ENCOUNTER — HOSPITAL ENCOUNTER (OUTPATIENT)
Age: 75
Discharge: HOME | End: 2020-01-07
Payer: MEDICARE

## 2020-01-07 LAB
ALBUMIN SERPL-MCNC: 3.9 G/DL (ref 3.4–5)
ANION GAP SERPL CALCULATED.3IONS-SCNC: 15 MMOL/L (ref 3–16)
APTT: 29.9 SEC (ref 24.2–36.2)
BILIRUBIN URINE: NEGATIVE
BLOOD, URINE: NEGATIVE
BUN BLDV-MCNC: 15 MG/DL (ref 7–20)
CALCIUM SERPL-MCNC: 9.3 MG/DL (ref 8.3–10.6)
CHLORIDE BLD-SCNC: 100 MMOL/L (ref 99–110)
CLARITY: CLEAR
CO2: 23 MMOL/L (ref 21–32)
COLOR: YELLOW
CREAT SERPL-MCNC: 0.6 MG/DL (ref 0.6–1.2)
GFR AFRICAN AMERICAN: >60
GFR NON-AFRICAN AMERICAN: >60
GLUCOSE BLD-MCNC: 93 MG/DL (ref 70–99)
GLUCOSE URINE: NEGATIVE MG/DL
INR BLD: 1.03 (ref 0.86–1.14)
KETONES, URINE: NEGATIVE MG/DL
LEUKOCYTE ESTERASE, URINE: NEGATIVE
MICROSCOPIC EXAMINATION: NORMAL
NITRITE, URINE: NEGATIVE
PH UA: 6 (ref 5–8)
POTASSIUM SERPL-SCNC: 4.4 MMOL/L (ref 3.5–5.1)
PROTEIN UA: NEGATIVE MG/DL
PROTHROMBIN TIME: 11.9 SEC (ref 10–13.2)
SODIUM BLD-SCNC: 138 MMOL/L (ref 136–145)
SPECIFIC GRAVITY UA: <=1.005 (ref 1–1.03)
TRANSFERRIN: 274 MG/DL (ref 200–360)
URINE TYPE: NORMAL
UROBILINOGEN, URINE: 0.2 E.U./DL

## 2020-01-07 PROCEDURE — 36415 COLL VENOUS BLD VENIPUNCTURE: CPT

## 2020-01-07 PROCEDURE — 81003 URINALYSIS AUTO W/O SCOPE: CPT

## 2020-01-07 PROCEDURE — 85610 PROTHROMBIN TIME: CPT

## 2020-01-07 PROCEDURE — 83036 HEMOGLOBIN GLYCOSYLATED A1C: CPT

## 2020-01-07 PROCEDURE — 80048 BASIC METABOLIC PNL TOTAL CA: CPT

## 2020-01-07 PROCEDURE — 84466 ASSAY OF TRANSFERRIN: CPT

## 2020-01-07 PROCEDURE — 87086 URINE CULTURE/COLONY COUNT: CPT

## 2020-01-07 PROCEDURE — 82040 ASSAY OF SERUM ALBUMIN: CPT

## 2020-01-07 PROCEDURE — 85730 THROMBOPLASTIN TIME PARTIAL: CPT

## 2020-01-08 ENCOUNTER — APPOINTMENT (OUTPATIENT)
Dept: GENERAL RADIOLOGY | Age: 75
End: 2020-01-08
Attending: ORTHOPAEDIC SURGERY
Payer: MEDICARE

## 2020-01-08 ENCOUNTER — HOSPITAL ENCOUNTER (OUTPATIENT)
Age: 75
Setting detail: OUTPATIENT SURGERY
Discharge: HOME | End: 2020-01-08
Attending: ORTHOPAEDIC SURGERY | Admitting: ORTHOPAEDIC SURGERY
Payer: MEDICARE

## 2020-01-08 ENCOUNTER — ANESTHESIA (OUTPATIENT)
Dept: OPERATING ROOM | Age: 75
End: 2020-01-08
Payer: MEDICARE

## 2020-01-08 ENCOUNTER — ANESTHESIA EVENT (OUTPATIENT)
Dept: OPERATING ROOM | Age: 75
End: 2020-01-08
Payer: MEDICARE

## 2020-01-08 VITALS
TEMPERATURE: 97.3 F | HEIGHT: 62 IN | DIASTOLIC BLOOD PRESSURE: 66 MMHG | HEART RATE: 59 BPM | BODY MASS INDEX: 22.08 KG/M2 | SYSTOLIC BLOOD PRESSURE: 116 MMHG | WEIGHT: 120 LBS | OXYGEN SATURATION: 100 % | RESPIRATION RATE: 16 BRPM

## 2020-01-08 VITALS — OXYGEN SATURATION: 100 % | SYSTOLIC BLOOD PRESSURE: 99 MMHG | DIASTOLIC BLOOD PRESSURE: 57 MMHG

## 2020-01-08 LAB
ESTIMATED AVERAGE GLUCOSE: 105.4 MG/DL
HBA1C MFR BLD: 5.3 %
URINE CULTURE, ROUTINE: NORMAL

## 2020-01-08 PROCEDURE — 3209999900 FLUORO FOR SURGICAL PROCEDURES

## 2020-01-08 PROCEDURE — 3600000002 HC SURGERY LEVEL 2 BASE: Performed by: ORTHOPAEDIC SURGERY

## 2020-01-08 PROCEDURE — 7100000011 HC PHASE II RECOVERY - ADDTL 15 MIN: Performed by: ORTHOPAEDIC SURGERY

## 2020-01-08 PROCEDURE — 6360000004 HC RX CONTRAST MEDICATION: Performed by: ORTHOPAEDIC SURGERY

## 2020-01-08 PROCEDURE — 20610 DRAIN/INJ JOINT/BURSA W/O US: CPT

## 2020-01-08 PROCEDURE — 6360000002 HC RX W HCPCS: Performed by: NURSE ANESTHETIST, CERTIFIED REGISTERED

## 2020-01-08 PROCEDURE — 2580000003 HC RX 258: Performed by: ANESTHESIOLOGY

## 2020-01-08 PROCEDURE — 7100000010 HC PHASE II RECOVERY - FIRST 15 MIN: Performed by: ORTHOPAEDIC SURGERY

## 2020-01-08 PROCEDURE — 3700000001 HC ADD 15 MINUTES (ANESTHESIA): Performed by: ORTHOPAEDIC SURGERY

## 2020-01-08 PROCEDURE — 2709999900 HC NON-CHARGEABLE SUPPLY: Performed by: ORTHOPAEDIC SURGERY

## 2020-01-08 PROCEDURE — 99024 POSTOP FOLLOW-UP VISIT: CPT | Performed by: PHYSICIAN ASSISTANT

## 2020-01-08 PROCEDURE — 2500000003 HC RX 250 WO HCPCS: Performed by: NURSE ANESTHETIST, CERTIFIED REGISTERED

## 2020-01-08 PROCEDURE — 3700000000 HC ANESTHESIA ATTENDED CARE: Performed by: ORTHOPAEDIC SURGERY

## 2020-01-08 PROCEDURE — 3600000012 HC SURGERY LEVEL 2 ADDTL 15MIN: Performed by: ORTHOPAEDIC SURGERY

## 2020-01-08 PROCEDURE — 6360000002 HC RX W HCPCS: Performed by: PHYSICIAN ASSISTANT

## 2020-01-08 RX ORDER — FENTANYL CITRATE 50 UG/ML
INJECTION, SOLUTION INTRAMUSCULAR; INTRAVENOUS PRN
Status: DISCONTINUED | OUTPATIENT
Start: 2020-01-08 | End: 2020-01-08 | Stop reason: SDUPTHER

## 2020-01-08 RX ORDER — LIDOCAINE HYDROCHLORIDE 10 MG/ML
1 INJECTION, SOLUTION EPIDURAL; INFILTRATION; INTRACAUDAL; PERINEURAL
Status: DISCONTINUED | OUTPATIENT
Start: 2020-01-08 | End: 2020-01-08 | Stop reason: HOSPADM

## 2020-01-08 RX ORDER — FENTANYL CITRATE 50 UG/ML
25 INJECTION, SOLUTION INTRAMUSCULAR; INTRAVENOUS EVERY 5 MIN PRN
Status: DISCONTINUED | OUTPATIENT
Start: 2020-01-08 | End: 2020-01-08 | Stop reason: HOSPADM

## 2020-01-08 RX ORDER — SODIUM CHLORIDE 0.9 % (FLUSH) 0.9 %
10 SYRINGE (ML) INJECTION EVERY 12 HOURS SCHEDULED
Status: DISCONTINUED | OUTPATIENT
Start: 2020-01-08 | End: 2020-01-08 | Stop reason: HOSPADM

## 2020-01-08 RX ORDER — PROPOFOL 10 MG/ML
INJECTION, EMULSION INTRAVENOUS PRN
Status: DISCONTINUED | OUTPATIENT
Start: 2020-01-08 | End: 2020-01-08 | Stop reason: SDUPTHER

## 2020-01-08 RX ORDER — OXYCODONE HYDROCHLORIDE AND ACETAMINOPHEN 5; 325 MG/1; MG/1
1 TABLET ORAL PRN
Status: DISCONTINUED | OUTPATIENT
Start: 2020-01-08 | End: 2020-01-08 | Stop reason: HOSPADM

## 2020-01-08 RX ORDER — ONDANSETRON 2 MG/ML
4 INJECTION INTRAMUSCULAR; INTRAVENOUS
Status: DISCONTINUED | OUTPATIENT
Start: 2020-01-08 | End: 2020-01-08 | Stop reason: HOSPADM

## 2020-01-08 RX ORDER — SODIUM CHLORIDE, SODIUM LACTATE, POTASSIUM CHLORIDE, CALCIUM CHLORIDE 600; 310; 30; 20 MG/100ML; MG/100ML; MG/100ML; MG/100ML
INJECTION, SOLUTION INTRAVENOUS CONTINUOUS
Status: DISCONTINUED | OUTPATIENT
Start: 2020-01-08 | End: 2020-01-08 | Stop reason: HOSPADM

## 2020-01-08 RX ORDER — SODIUM CHLORIDE 0.9 % (FLUSH) 0.9 %
10 SYRINGE (ML) INJECTION PRN
Status: DISCONTINUED | OUTPATIENT
Start: 2020-01-08 | End: 2020-01-08 | Stop reason: HOSPADM

## 2020-01-08 RX ORDER — HYDRALAZINE HYDROCHLORIDE 20 MG/ML
5 INJECTION INTRAMUSCULAR; INTRAVENOUS EVERY 10 MIN PRN
Status: DISCONTINUED | OUTPATIENT
Start: 2020-01-08 | End: 2020-01-08 | Stop reason: HOSPADM

## 2020-01-08 RX ORDER — OXYCODONE HYDROCHLORIDE AND ACETAMINOPHEN 5; 325 MG/1; MG/1
2 TABLET ORAL PRN
Status: DISCONTINUED | OUTPATIENT
Start: 2020-01-08 | End: 2020-01-08 | Stop reason: HOSPADM

## 2020-01-08 RX ORDER — LIDOCAINE HYDROCHLORIDE 10 MG/ML
INJECTION, SOLUTION INFILTRATION; PERINEURAL PRN
Status: DISCONTINUED | OUTPATIENT
Start: 2020-01-08 | End: 2020-01-08 | Stop reason: SDUPTHER

## 2020-01-08 RX ORDER — PROMETHAZINE HYDROCHLORIDE 25 MG/ML
6.25 INJECTION, SOLUTION INTRAMUSCULAR; INTRAVENOUS
Status: DISCONTINUED | OUTPATIENT
Start: 2020-01-08 | End: 2020-01-08 | Stop reason: HOSPADM

## 2020-01-08 RX ORDER — MIDAZOLAM HYDROCHLORIDE 1 MG/ML
INJECTION INTRAMUSCULAR; INTRAVENOUS PRN
Status: DISCONTINUED | OUTPATIENT
Start: 2020-01-08 | End: 2020-01-08 | Stop reason: SDUPTHER

## 2020-01-08 RX ADMIN — SODIUM CHLORIDE, POTASSIUM CHLORIDE, SODIUM LACTATE AND CALCIUM CHLORIDE: 600; 310; 30; 20 INJECTION, SOLUTION INTRAVENOUS at 06:50

## 2020-01-08 RX ADMIN — LIDOCAINE HYDROCHLORIDE 40 MG: 10 INJECTION, SOLUTION INFILTRATION; PERINEURAL at 07:03

## 2020-01-08 RX ADMIN — PROPOFOL 50 MG: 10 INJECTION, EMULSION INTRAVENOUS at 07:03

## 2020-01-08 RX ADMIN — MIDAZOLAM HYDROCHLORIDE 1 MG: 2 INJECTION, SOLUTION INTRAMUSCULAR; INTRAVENOUS at 06:59

## 2020-01-08 RX ADMIN — CEFAZOLIN 2 G: 1 INJECTION, POWDER, FOR SOLUTION INTRAMUSCULAR; INTRAVENOUS at 06:59

## 2020-01-08 RX ADMIN — FENTANYL CITRATE 25 MCG: 50 INJECTION INTRAMUSCULAR; INTRAVENOUS at 06:59

## 2020-01-08 ASSESSMENT — PULMONARY FUNCTION TESTS
PIF_VALUE: 0

## 2020-01-08 ASSESSMENT — ENCOUNTER SYMPTOMS
SHORTNESS OF BREATH: 1
DYSPNEA ACTIVITY LEVEL: AFTER AMBULATING 2 FLIGHTS OF STAIRS

## 2020-01-08 ASSESSMENT — PAIN SCALES - GENERAL: PAINLEVEL_OUTOF10: 0

## 2020-01-08 ASSESSMENT — LIFESTYLE VARIABLES: SMOKING_STATUS: 0

## 2020-01-08 ASSESSMENT — PAIN - FUNCTIONAL ASSESSMENT: PAIN_FUNCTIONAL_ASSESSMENT: 0-10

## 2020-01-08 NOTE — ANESTHESIA POSTPROCEDURE EVALUATION
Department of Anesthesiology  Postprocedure Note    Patient: Carmen Kaiser  MRN: [id number]  YOB: 1945  Date of evaluation: 1/8/2020    Procedure Summary     Date:  01/08/20 Room / Location[facility]    Anesthesia Start:  9544 Anesthesia Stop:  1023    Procedure:  LEFT HIP ASPIRATION WITH SYNOVASURE  CPT CODE - 95020 (Left Hip) Diagnosis:       Primary osteoarthritis of left hip      (LEFT HIP OSTEOARTHRITIS)    Surgeon:  Jefe Winston MD Responsible Provider:  Tatum Solares MD    Anesthesia Type:  general, TIVA ASA Status:  3        Anesthesia Type: general, TIVA    Daja Phase I:      Daja Phase II: Daja Score: 9    Last vitals: Reviewed and per EMR flowsheets.      Anesthesia Post Evaluation   Anesthetic Problems: no   Cardiovascular System Stable: yes  Respiratory Function: Airway Patent yes  ETT no  Ventilator no  Level of consciousness: awake, alert and oriented  Post-op pain: adequate analgesia  Hydration Adequate: yes  Nausea/Vomiting:no  Other Issues:     Mackie Fothergill, MD

## 2020-01-08 NOTE — H&P
I have reviewed the history and physical and examined the patient and find no relevant changes. I have reviewed with the patient and/or family the risks, benefits, and alternatives to the procedure.    H&P REVIEWED AND IS IN CHART/EPIC   IT WILL NOT BE DUPLICATED
NIGHTLY 10/11/19  Yes Yary Grigsby MD   isosorbide mononitrate (IMDUR) 30 MG extended release tablet TAKE 1 TABLET BY MOUTH ONCE DAILY (NEED OFFICE VISIT) 10/11/19  Yes Yary Grigsby MD   lovastatin (MEVACOR) 20 MG tablet Take 1 tablet by mouth nightly 9/12/19  Yes Yary Grigsby MD   losartan (COZAAR) 50 MG tablet Take 1 tablet by mouth daily 8/27/19  Yes Yary Grigsby MD   metoprolol tartrate (LOPRESSOR) 25 MG tablet TAKE ONE TABLET BY MOUTH TWICE A DAY 6/24/19  Yes Yary Grigsby MD   Calcium Carbonate-Vitamin D (OYSTER SHELL CALCIUM/D) 500-200 MG-UNIT TABS TAKE 1 TABLET BY MOUTH ONCE DAILY 2/8/19  Yes HIRAM Adamson - CNP   Multiple Vitamins-Minerals (THERAPEUTIC MULTIVITAMIN-MINERALS) tablet Take 1 tablet by mouth daily   Yes Historical Provider, MD   polyvinyl alcohol-povidone (HYPOTEARS) 1.4-0.6 % ophthalmic solution 1-2 drops as needed. Yes Historical Provider, MD   denosumab (PROLIA) 60 MG/ML SOLN SC injection Inject 1 mL into the skin every 6 months 2/21/19   Yary Grigsby MD   guaiFENesin (MUCINEX) 600 MG extended release tablet Take 1,200 mg by mouth as needed     Historical Provider, MD   nitroGLYCERIN (NITROSTAT) 0.4 MG SL tablet Place 1 tablet under the tongue every 5 minutes as needed for Chest pain 1/12/17   Leon Carson MD   butalbital-acetaminophen-caffeine (FIORICET, ESGIC) -28 MG per tablet Take 1 tablet by mouth every 4 hours as needed for Headaches    Historical Provider, MD   meloxicam (MOBIC) 15 MG tablet Take 15 mg by mouth daily    Historical Provider, MD   mometasone (NASONEX) 50 MCG/ACT nasal spray 2 sprays by Nasal route daily. Each nostril  Patient taking differently: 2 sprays by Nasal route as needed Each nostril 10/17/13   Narayan Novoa MD   aspirin 81 MG tablet Take 81 mg by mouth daily. Historical Provider, MD       Allergies:  Ranolazine; Amoxicillin; and Oxycodone    Social History:    Tobacco:  reports that she has never smoked.  She has never used

## 2020-01-08 NOTE — OP NOTE
PATIENT NAME: Chuyita Yip M.D. AGE: 76 y.o. PATIENT TYPE  female    PRE-OPERATIVE DIAGNOSIS: left hip pain,s/p bipolar hemiarthroplasty    POST-OPERATIVE DIAGNOSIS: left hip pain, ,s/p bipolar hemiarthroplasty    PROCEDURE PERFORMED:  left hip steroid injection and arthrogram under fluoroscopic guidance. ANESTHESIA:  Mac    COMPLICATIONS:  None apparent. EBL: NONE    POST-OPERATIVE CONDITION: To Recovery room. INDICATIONS FOR SURGERY:  The patient is a 76y.o.-year-old female with a long history of hip pain s/p bipolar hemiarthroplasty which is refractory to conservative measures affecting the patients activities of daily living. Her sed rate was elevated, as part of her work up, an aspiration was suggested. She was apprised of the risks, benefits and alternatives of surgery and all questions were answered and the patient wished to proceed with surgery. DETAILS OF THE PROCEDURE:  The patient was brought to the operating room and after the administration of MAC anesthesia, was placed on the OR table in the supine position. Under live fluoroscopic guidance, after the hip was prepped and draped in normal sterile fashion, a 6-inch 22 gauge spinal needle was introduced into the anterolateral aspect of the hip, lateral to the femoral neurovascular bundle into the hip joint. 2mL of Conray dye were introduced into the hip and arthrogram was performed. Following this, an aspiration of the hip joint was done with 3 cc of joint fluid obtained. This was clear and blood tinged. The sample was sent to the lab for synovasure cell count and c and s. The needle was withdrawn an arthrogram was performed. ARTHROGRAM left HIP: Using successive internal and external rotation, abduction, adduction, flexion and extension and distraction, a complete arthrogram was performed of the hip.   The patient did have significant osteoarthritic change with decreased cartilage space, consistent with osteoarthritis; The Bipolar hemiarthroplasty is in place with no    2 views of the  left hip including AP and lateral were taken and saved for permanent record    DISPOSITION:  The patient was then emerged from anesthesia, taken to the recovery room in stable condition, having tolerated the procedure well.     Morenita Gonsales M.D.

## 2020-01-09 ENCOUNTER — OFFICE VISIT (OUTPATIENT)
Dept: FAMILY MEDICINE CLINIC | Age: 75
End: 2020-01-09
Payer: MEDICARE

## 2020-01-09 VITALS
DIASTOLIC BLOOD PRESSURE: 66 MMHG | WEIGHT: 129 LBS | BODY MASS INDEX: 23.59 KG/M2 | SYSTOLIC BLOOD PRESSURE: 116 MMHG | OXYGEN SATURATION: 98 % | HEART RATE: 65 BPM

## 2020-01-09 PROCEDURE — 99213 OFFICE O/P EST LOW 20 MIN: CPT | Performed by: FAMILY MEDICINE

## 2020-01-09 PROCEDURE — 93000 ELECTROCARDIOGRAM COMPLETE: CPT | Performed by: FAMILY MEDICINE

## 2020-01-09 ASSESSMENT — PATIENT HEALTH QUESTIONNAIRE - PHQ9
SUM OF ALL RESPONSES TO PHQ QUESTIONS 1-9: 0
SUM OF ALL RESPONSES TO PHQ9 QUESTIONS 1 & 2: 0
SUM OF ALL RESPONSES TO PHQ QUESTIONS 1-9: 0
2. FEELING DOWN, DEPRESSED OR HOPELESS: 0
1. LITTLE INTEREST OR PLEASURE IN DOING THINGS: 0

## 2020-01-09 NOTE — PROGRESS NOTES
[facility]  [phone]  Fax: [phone]   Pre-operative History and Physical      DIAGNOSIS:  Previously partial hip replacement failure    PROCEDURE:  Left Total hip replacement      History Obtained From:  patient    HISTORY OF PRESENT ILLNESS:    The patient is a 76 y.o. female with significant past medical history of prior partial hip replacement 2018 due to accident. who presents for preoperative exam requested by Dr. Carl Ramirez.        Past Medical History:   Diagnosis Date    Allergic rhinitis     Arthritis     CAD (coronary artery disease)     Fractures     Hyperlipidemia     Hypertension     Migraine headache     since 20's    Parkinson disease ([address]     Reactive airway disease     Shingles 10/2019    GROIN LEFT    Sjogren's syndrome [facility] [address] 2012    Dr. Flower Thakkar     Past Surgical History:   Procedure Laterality Date    BREAST SURGERY Left 1970    BIOPSY    COLONOSCOPY  09/15/10    Dr. Camila Alford  12/22/2016    DENTAL SURGERY  12/4/20    TEETH EXTRACTION    [address] Left 6/17/2019    LEFT DEQUERVAIN'S RELEASE performed by Kailyn Garza MD at [address] / FINGER Left 6/17/2019    EXCISION LEFT WRIST GANGLION performed by Kailyn Garza MD at [address] Left     broke the femur    HIP SURGERY      JOINT REPLACEMENT Left 04/2018    HIP    SINUS SURGERY  07/2015    TUBAL LIGATION  1972     Current Outpatient Medications   Medication Sig Dispense Refill    carbidopa-levodopa (SINEMET)  MG per tablet TAKE 1 TABLET BY MOUTH 4 TIMES DAILY 360 tablet 0    amLODIPine (NORVASC) 5 MG tablet TAKE 1 TABLET BY MOUTH ONCE DAILY (Patient taking differently: nightly ) 30 tablet 5    montelukast (SINGULAIR) 10 MG tablet TAKE 1 TABLET BY MOUTH NIGHTLY 30 tablet 5    isosorbide mononitrate (IMDUR) 30 MG extended release tablet TAKE 1 TABLET BY MOUTH ONCE DAILY (NEED OFFICE VISIT) 90 tablet 3    lovastatin (MEVACOR) 20 MG tablet Take 1 tablet by mouth nightly 90 tablet 3    losartan (COZAAR) 50 MG tablet Take 1 tablet by mouth daily 90 tablet 3    metoprolol tartrate (LOPRESSOR) 25 MG tablet TAKE ONE TABLET BY MOUTH TWICE A DAY 60 tablet 11    Calcium Carbonate-Vitamin D (OYSTER SHELL CALCIUM/D) 500-200 MG-UNIT TABS TAKE 1 TABLET BY MOUTH ONCE DAILY 30 tablet 0    Multiple Vitamins-Minerals (THERAPEUTIC MULTIVITAMIN-MINERALS) tablet Take 1 tablet by mouth daily      polyvinyl alcohol-povidone (HYPOTEARS) 1.4-0.6 % ophthalmic solution 1-2 drops as needed.  denosumab (PROLIA) 60 MG/ML SOLN SC injection Inject 1 mL into the skin every 6 months (Patient not taking: Reported on 1/9/2020) 1 mL 0    guaiFENesin (MUCINEX) 600 MG extended release tablet Take 1,200 mg by mouth as needed       nitroGLYCERIN (NITROSTAT) 0.4 MG SL tablet Place 1 tablet under the tongue every 5 minutes as needed for Chest pain (Patient not taking: Reported on 1/9/2020) 25 tablet 3    butalbital-acetaminophen-caffeine (FIORICET, ESGIC) -40 MG per tablet Take 1 tablet by mouth every 4 hours as needed for Headaches      meloxicam (MOBIC) 15 MG tablet Take 15 mg by mouth daily      mometasone (NASONEX) 50 MCG/ACT nasal spray 2 sprays by Nasal route daily. Each nostril (Patient not taking: Reported on 1/9/2020) 3 g 3    aspirin 81 MG tablet Take 81 mg by mouth daily. No current facility-administered medications for this visit. Allergies:  Ranolazine; Amoxicillin; and Oxycodone  History of allergic reaction to anesthesia:  No     Social History     Tobacco Use   Smoking Status Never Smoker   Smokeless Tobacco Never Used     The patient states she drinks zero per week.     Family History   Problem Relation Age of Onset    Heart Attack Father     High Cholesterol Father     Heart Disease Father     High Cholesterol Mother        REVIEW OF SYSTEMS:    CONSTITUTIONAL:  negative  EYES:  negative  HEENT: negative  RESPIRATORY:  negative  CARDIOVASCULAR:  negative  GASTROINTESTINAL:  negative  GENITOURINARY:  negative  INTEGUMENT/BREAST:  negative  HEMATOLOGIC/LYMPHATIC:  negative  ALLERGIC/IMMUNOLOGIC:  negative  ENDOCRINE:  negative  MUSCULOSKELETAL:  negative  NEUROLOGICAL:  negative    PHYSICAL EXAM:      /66   Pulse 65   Wt 129 lb (58.5 kg)   SpO2 98%   BMI 23.59 kg/m²     CONSTITUTIONAL:  awake, alert, cooperative, no apparent distress, and appears stated age    Eyes:  Lids and lashes normal, pupils equal, round and reactive to light, extra ocular muscles intact, sclera clear, conjunctiva normal    Head/ENT:  Normocephalic, without obvious abnormality, atramatic, sinuses nontender on palpation, external ears without lesions, oral pharynx with moist mucus membranes, tonsils without erythema or exudates, gums normal and good dentition. Neck:  Supple, symmetrical, trachea midline, no adenopathy, thyroid symmetric, not enlarged and no tenderness, skin normal, No carotid bruit    Heart:  Normal apical impulse, regular rate and rhythm, normal S1 and S2, no S3 or S4, and no murmur noted    Lungs:  No increased work of breathing, good air exchange, clear to auscultation bilaterally, no crackles or wheezing    Abdomen:  No scars, normal bowel sounds, soft, non-distended, non-tender, no masses palpated, no hepatosplenomegally    Extremities:  No clubbing, cyanosis, or edema    NEUROLOGIC:  Awake, alert, oriented to name, place and time. Cranial nerves II-XII are grossly intact. Motor is 5 out of 5 bilaterally. DATA:  EKG:  Date:  1/9/20  I have reviewed EKG with the following interpretation:  Impression:  NSR. Normal ekg          ASSESSMENT AND PLAN:    1. Patient is a 76 y.o. female with above specified procedure planned on 1/29/20 with Dr. Goran Moncada at [facility]. 2. Stop ASA/NSAIDS medications 7-10 days prior to procedure. 3.  Cleared for surgery.   4.  Copy being sent to [facility]

## 2020-01-10 ENCOUNTER — TELEPHONE (OUTPATIENT)
Dept: ORTHOPEDIC SURGERY | Age: 75
End: 2020-01-10

## 2020-01-15 ENCOUNTER — TELEPHONE (OUTPATIENT)
Dept: ORTHOPEDIC SURGERY | Age: 75
End: 2020-01-15

## 2020-01-15 NOTE — TELEPHONE ENCOUNTER
L/M THAT CT SCAN WAS DENIED FOR [facility] AND WAS RESCHEDULED TO [facility] FOR 1/17/20 @ 4:30PM

## 2020-01-22 ENCOUNTER — TELEPHONE (OUTPATIENT)
Dept: ORTHOPEDIC SURGERY | Age: 75
End: 2020-01-22

## 2020-01-28 ENCOUNTER — ANESTHESIA EVENT (OUTPATIENT)
Dept: OPERATING ROOM | Age: 75
DRG: 467 | End: 2020-01-28
Payer: MEDICARE

## 2020-01-28 RX ORDER — CELECOXIB 100 MG/1
100 CAPSULE ORAL 2 TIMES DAILY
Status: CANCELLED | OUTPATIENT
Start: 2020-01-28

## 2020-01-28 RX ORDER — GABAPENTIN 300 MG/1
300 CAPSULE ORAL 3 TIMES DAILY
Status: CANCELLED | OUTPATIENT
Start: 2020-01-28

## 2020-01-29 ENCOUNTER — HOSPITAL ENCOUNTER (INPATIENT)
Age: 75
LOS: 2 days | Discharge: HOME | DRG: 467 | End: 2020-01-31
Attending: ORTHOPAEDIC SURGERY | Admitting: ORTHOPAEDIC SURGERY
Payer: MEDICARE

## 2020-01-29 ENCOUNTER — HOSPITAL ENCOUNTER (OUTPATIENT)
Dept: GENERAL RADIOLOGY | Age: 75
Discharge: HOME | DRG: 467 | End: 2020-01-29
Attending: ORTHOPAEDIC SURGERY
Payer: MEDICARE

## 2020-01-29 ENCOUNTER — ANESTHESIA (OUTPATIENT)
Dept: OPERATING ROOM | Age: 75
DRG: 467 | End: 2020-01-29
Payer: MEDICARE

## 2020-01-29 VITALS
RESPIRATION RATE: 8 BRPM | SYSTOLIC BLOOD PRESSURE: 99 MMHG | OXYGEN SATURATION: 100 % | DIASTOLIC BLOOD PRESSURE: 58 MMHG | TEMPERATURE: 94.8 F

## 2020-01-29 PROBLEM — Z96.642 HISTORY OF LEFT HIP HEMIARTHROPLASTY: Status: ACTIVE | Noted: 2020-01-29

## 2020-01-29 LAB
ABO/RH: NORMAL
ANTIBODY SCREEN: NORMAL
GLUCOSE BLD-MCNC: 168 MG/DL (ref 70–99)
PERFORMED ON: ABNORMAL

## 2020-01-29 PROCEDURE — 73502 X-RAY EXAM HIP UNI 2-3 VIEWS: CPT

## 2020-01-29 PROCEDURE — 2500000003 HC RX 250 WO HCPCS: Performed by: NURSE ANESTHETIST, CERTIFIED REGISTERED

## 2020-01-29 PROCEDURE — 2500000003 HC RX 250 WO HCPCS: Performed by: ORTHOPAEDIC SURGERY

## 2020-01-29 PROCEDURE — 76942 ECHO GUIDE FOR BIOPSY: CPT | Performed by: ANESTHESIOLOGY

## 2020-01-29 PROCEDURE — 1200000000 HC SEMI PRIVATE

## 2020-01-29 PROCEDURE — 2500000003 HC RX 250 WO HCPCS: Performed by: ANESTHESIOLOGY

## 2020-01-29 PROCEDURE — 94761 N-INVAS EAR/PLS OXIMETRY MLT: CPT

## 2020-01-29 PROCEDURE — 2720000010 HC SURG SUPPLY STERILE: Performed by: ORTHOPAEDIC SURGERY

## 2020-01-29 PROCEDURE — 2580000003 HC RX 258: Performed by: PHYSICIAN ASSISTANT

## 2020-01-29 PROCEDURE — 2709999900 HC NON-CHARGEABLE SUPPLY: Performed by: ORTHOPAEDIC SURGERY

## 2020-01-29 PROCEDURE — 86901 BLOOD TYPING SEROLOGIC RH(D): CPT

## 2020-01-29 PROCEDURE — 0SPB0JZ REMOVAL OF SYNTHETIC SUBSTITUTE FROM LEFT HIP JOINT, OPEN APPROACH: ICD-10-PCS | Performed by: ORTHOPAEDIC SURGERY

## 2020-01-29 PROCEDURE — 3209999900 FLUORO FOR SURGICAL PROCEDURES

## 2020-01-29 PROCEDURE — 6360000002 HC RX W HCPCS: Performed by: ORTHOPAEDIC SURGERY

## 2020-01-29 PROCEDURE — 6360000002 HC RX W HCPCS: Performed by: NURSE ANESTHETIST, CERTIFIED REGISTERED

## 2020-01-29 PROCEDURE — 3600000015 HC SURGERY LEVEL 5 ADDTL 15MIN: Performed by: ORTHOPAEDIC SURGERY

## 2020-01-29 PROCEDURE — 6370000000 HC RX 637 (ALT 250 FOR IP): Performed by: PHYSICIAN ASSISTANT

## 2020-01-29 PROCEDURE — 7100000001 HC PACU RECOVERY - ADDTL 15 MIN: Performed by: ORTHOPAEDIC SURGERY

## 2020-01-29 PROCEDURE — 2580000003 HC RX 258: Performed by: ANESTHESIOLOGY

## 2020-01-29 PROCEDURE — 0SRB0JA REPLACEMENT OF LEFT HIP JOINT WITH SYNTHETIC SUBSTITUTE, UNCEMENTED, OPEN APPROACH: ICD-10-PCS | Performed by: ORTHOPAEDIC SURGERY

## 2020-01-29 PROCEDURE — 3700000001 HC ADD 15 MINUTES (ANESTHESIA): Performed by: ORTHOPAEDIC SURGERY

## 2020-01-29 PROCEDURE — 86850 RBC ANTIBODY SCREEN: CPT

## 2020-01-29 PROCEDURE — 3700000000 HC ANESTHESIA ATTENDED CARE: Performed by: ORTHOPAEDIC SURGERY

## 2020-01-29 PROCEDURE — C9290 INJ, BUPIVACAINE LIPOSOME: HCPCS | Performed by: ORTHOPAEDIC SURGERY

## 2020-01-29 PROCEDURE — 7100000000 HC PACU RECOVERY - FIRST 15 MIN: Performed by: ORTHOPAEDIC SURGERY

## 2020-01-29 PROCEDURE — 86900 BLOOD TYPING SEROLOGIC ABO: CPT

## 2020-01-29 PROCEDURE — 6370000000 HC RX 637 (ALT 250 FOR IP): Performed by: ANESTHESIOLOGY

## 2020-01-29 PROCEDURE — 6360000002 HC RX W HCPCS: Performed by: ANESTHESIOLOGY

## 2020-01-29 PROCEDURE — 2700000000 HC OXYGEN THERAPY PER DAY

## 2020-01-29 PROCEDURE — 3E0T3BZ INTRODUCTION OF ANESTHETIC AGENT INTO PERIPHERAL NERVES AND PLEXI, PERCUTANEOUS APPROACH: ICD-10-PCS | Performed by: ANESTHESIOLOGY

## 2020-01-29 PROCEDURE — C1776 JOINT DEVICE (IMPLANTABLE): HCPCS | Performed by: ORTHOPAEDIC SURGERY

## 2020-01-29 PROCEDURE — 2580000003 HC RX 258: Performed by: ORTHOPAEDIC SURGERY

## 2020-01-29 PROCEDURE — 3600000005 HC SURGERY LEVEL 5 BASE: Performed by: ORTHOPAEDIC SURGERY

## 2020-01-29 PROCEDURE — 2500000003 HC RX 250 WO HCPCS

## 2020-01-29 DEVICE — REFLECTION SPHERICAL HEAD SCREW 15MM
Type: IMPLANTABLE DEVICE | Site: HIP | Status: FUNCTIONAL
Brand: REFLECTION

## 2020-01-29 DEVICE — SYNERGY POROUS COATED FEMORAL SIZE 12
Type: IMPLANTABLE DEVICE | Site: HIP | Status: FUNCTIONAL
Brand: SYNERGY

## 2020-01-29 DEVICE — REFLECTION SPHERICAL HEAD SCREW 20MM
Type: IMPLANTABLE DEVICE | Site: HIP | Status: FUNCTIONAL
Brand: REFLECTION

## 2020-01-29 DEVICE — R3 3 HOLE ACETABULAR SHELL 50MM
Type: IMPLANTABLE DEVICE | Site: HIP | Status: FUNCTIONAL
Brand: R3 ACETABULAR

## 2020-01-29 DEVICE — COBALT CHROME 12/14 TAPER FEMORAL                                    HEAD 32MM - 3: Type: IMPLANTABLE DEVICE | Site: HIP | Status: FUNCTIONAL

## 2020-01-29 DEVICE — R3 0 DEGREE XLPE ACETABULAR LINER                                    32MM ID X OD 50MM
Type: IMPLANTABLE DEVICE | Site: HIP | Status: FUNCTIONAL
Brand: R3

## 2020-01-29 RX ORDER — LOSARTAN POTASSIUM 25 MG/1
50 TABLET ORAL DAILY
Status: DISCONTINUED | OUTPATIENT
Start: 2020-01-29 | End: 2020-01-31 | Stop reason: HOSPADM

## 2020-01-29 RX ORDER — ACETAMINOPHEN 500 MG
1000 TABLET ORAL ONCE
Status: COMPLETED | OUTPATIENT
Start: 2020-01-29 | End: 2020-01-29

## 2020-01-29 RX ORDER — SODIUM CHLORIDE 0.9 % (FLUSH) 0.9 %
10 SYRINGE (ML) INJECTION EVERY 12 HOURS SCHEDULED
Status: DISCONTINUED | OUTPATIENT
Start: 2020-01-29 | End: 2020-01-29

## 2020-01-29 RX ORDER — CLONIDINE HYDROCHLORIDE 0.1 MG/1
0.1 TABLET ORAL ONCE
Status: DISCONTINUED | OUTPATIENT
Start: 2020-01-29 | End: 2020-01-29

## 2020-01-29 RX ORDER — MONTELUKAST SODIUM 10 MG/1
10 TABLET ORAL NIGHTLY
Status: DISCONTINUED | OUTPATIENT
Start: 2020-01-29 | End: 2020-01-31 | Stop reason: HOSPADM

## 2020-01-29 RX ORDER — SODIUM CHLORIDE, SODIUM LACTATE, POTASSIUM CHLORIDE, CALCIUM CHLORIDE 600; 310; 30; 20 MG/100ML; MG/100ML; MG/100ML; MG/100ML
INJECTION, SOLUTION INTRAVENOUS CONTINUOUS
Status: DISCONTINUED | OUTPATIENT
Start: 2020-01-29 | End: 2020-01-29

## 2020-01-29 RX ORDER — NITROGLYCERIN 0.4 MG/1
0.4 TABLET SUBLINGUAL EVERY 5 MIN PRN
Status: DISCONTINUED | OUTPATIENT
Start: 2020-01-29 | End: 2020-01-31 | Stop reason: HOSPADM

## 2020-01-29 RX ORDER — ONDANSETRON 2 MG/ML
INJECTION INTRAMUSCULAR; INTRAVENOUS PRN
Status: DISCONTINUED | OUTPATIENT
Start: 2020-01-29 | End: 2020-01-29 | Stop reason: SDUPTHER

## 2020-01-29 RX ORDER — HYDROCODONE BITARTRATE AND ACETAMINOPHEN 5; 325 MG/1; MG/1
2 TABLET ORAL EVERY 4 HOURS PRN
Status: DISCONTINUED | OUTPATIENT
Start: 2020-01-29 | End: 2020-01-30

## 2020-01-29 RX ORDER — LABETALOL HYDROCHLORIDE 5 MG/ML
5 INJECTION, SOLUTION INTRAVENOUS EVERY 10 MIN PRN
Status: DISCONTINUED | OUTPATIENT
Start: 2020-01-29 | End: 2020-01-29

## 2020-01-29 RX ORDER — NICOTINE POLACRILEX 4 MG
15 LOZENGE BUCCAL PRN
Status: DISCONTINUED | OUTPATIENT
Start: 2020-01-29 | End: 2020-01-31 | Stop reason: HOSPADM

## 2020-01-29 RX ORDER — ONDANSETRON 2 MG/ML
4 INJECTION INTRAMUSCULAR; INTRAVENOUS EVERY 6 HOURS PRN
Status: DISCONTINUED | OUTPATIENT
Start: 2020-01-29 | End: 2020-01-31 | Stop reason: HOSPADM

## 2020-01-29 RX ORDER — MORPHINE SULFATE 2 MG/ML
2 INJECTION, SOLUTION INTRAMUSCULAR; INTRAVENOUS
Status: DISCONTINUED | OUTPATIENT
Start: 2020-01-29 | End: 2020-01-30

## 2020-01-29 RX ORDER — SODIUM CHLORIDE, SODIUM LACTATE, POTASSIUM CHLORIDE, CALCIUM CHLORIDE 600; 310; 30; 20 MG/100ML; MG/100ML; MG/100ML; MG/100ML
INJECTION, SOLUTION INTRAVENOUS CONTINUOUS
Status: DISCONTINUED | OUTPATIENT
Start: 2020-01-29 | End: 2020-01-31 | Stop reason: HOSPADM

## 2020-01-29 RX ORDER — DOCUSATE SODIUM 100 MG/1
100 CAPSULE, LIQUID FILLED ORAL 2 TIMES DAILY
Status: DISCONTINUED | OUTPATIENT
Start: 2020-01-29 | End: 2020-01-31 | Stop reason: HOSPADM

## 2020-01-29 RX ORDER — FENTANYL CITRATE 50 UG/ML
INJECTION, SOLUTION INTRAMUSCULAR; INTRAVENOUS PRN
Status: DISCONTINUED | OUTPATIENT
Start: 2020-01-29 | End: 2020-01-29 | Stop reason: SDUPTHER

## 2020-01-29 RX ORDER — FLUTICASONE PROPIONATE 50 MCG
1 SPRAY, SUSPENSION (ML) NASAL DAILY
Status: DISCONTINUED | OUTPATIENT
Start: 2020-01-30 | End: 2020-01-31 | Stop reason: HOSPADM

## 2020-01-29 RX ORDER — DEXAMETHASONE SODIUM PHOSPHATE 4 MG/ML
INJECTION, SOLUTION INTRA-ARTICULAR; INTRALESIONAL; INTRAMUSCULAR; INTRAVENOUS; SOFT TISSUE PRN
Status: DISCONTINUED | OUTPATIENT
Start: 2020-01-29 | End: 2020-01-29 | Stop reason: SDUPTHER

## 2020-01-29 RX ORDER — MORPHINE SULFATE 2 MG/ML
1 INJECTION, SOLUTION INTRAMUSCULAR; INTRAVENOUS EVERY 5 MIN PRN
Status: DISCONTINUED | OUTPATIENT
Start: 2020-01-29 | End: 2020-01-29

## 2020-01-29 RX ORDER — OXYCODONE HYDROCHLORIDE AND ACETAMINOPHEN 5; 325 MG/1; MG/1
2 TABLET ORAL PRN
Status: DISCONTINUED | OUTPATIENT
Start: 2020-01-29 | End: 2020-01-29

## 2020-01-29 RX ORDER — PROMETHAZINE HYDROCHLORIDE 25 MG/ML
6.25 INJECTION, SOLUTION INTRAMUSCULAR; INTRAVENOUS
Status: DISCONTINUED | OUTPATIENT
Start: 2020-01-29 | End: 2020-01-29

## 2020-01-29 RX ORDER — LIDOCAINE HYDROCHLORIDE 10 MG/ML
INJECTION, SOLUTION INFILTRATION; PERINEURAL PRN
Status: DISCONTINUED | OUTPATIENT
Start: 2020-01-29 | End: 2020-01-29 | Stop reason: SDUPTHER

## 2020-01-29 RX ORDER — AMLODIPINE BESYLATE 5 MG/1
5 TABLET ORAL DAILY
Status: DISCONTINUED | OUTPATIENT
Start: 2020-01-29 | End: 2020-01-31 | Stop reason: HOSPADM

## 2020-01-29 RX ORDER — CALCIUM CARBONATE/VITAMIN D3 250-3.125
500 TABLET ORAL DAILY
Status: DISCONTINUED | OUTPATIENT
Start: 2020-01-29 | End: 2020-01-31 | Stop reason: HOSPADM

## 2020-01-29 RX ORDER — BUPIVACAINE HYDROCHLORIDE 2.5 MG/ML
INJECTION, SOLUTION EPIDURAL; INFILTRATION; INTRACAUDAL PRN
Status: DISCONTINUED | OUTPATIENT
Start: 2020-01-29 | End: 2020-01-29 | Stop reason: SDUPTHER

## 2020-01-29 RX ORDER — HYDRALAZINE HYDROCHLORIDE 20 MG/ML
5 INJECTION INTRAMUSCULAR; INTRAVENOUS EVERY 10 MIN PRN
Status: DISCONTINUED | OUTPATIENT
Start: 2020-01-29 | End: 2020-01-29

## 2020-01-29 RX ORDER — HYDROCODONE BITARTRATE AND ACETAMINOPHEN 5; 325 MG/1; MG/1
1 TABLET ORAL EVERY 4 HOURS PRN
Status: DISCONTINUED | OUTPATIENT
Start: 2020-01-29 | End: 2020-01-31 | Stop reason: HOSPADM

## 2020-01-29 RX ORDER — LIDOCAINE HYDROCHLORIDE 10 MG/ML
0.3 INJECTION, SOLUTION EPIDURAL; INFILTRATION; INTRACAUDAL; PERINEURAL
Status: DISCONTINUED | OUTPATIENT
Start: 2020-01-29 | End: 2020-01-29

## 2020-01-29 RX ORDER — MORPHINE SULFATE 4 MG/ML
4 INJECTION, SOLUTION INTRAMUSCULAR; INTRAVENOUS
Status: DISCONTINUED | OUTPATIENT
Start: 2020-01-29 | End: 2020-01-30

## 2020-01-29 RX ORDER — SENNA AND DOCUSATE SODIUM 50; 8.6 MG/1; MG/1
1 TABLET, FILM COATED ORAL 2 TIMES DAILY
Status: DISCONTINUED | OUTPATIENT
Start: 2020-01-29 | End: 2020-01-31 | Stop reason: HOSPADM

## 2020-01-29 RX ORDER — CELECOXIB 100 MG/1
200 CAPSULE ORAL ONCE
Status: COMPLETED | OUTPATIENT
Start: 2020-01-29 | End: 2020-01-29

## 2020-01-29 RX ORDER — ROCURONIUM BROMIDE 10 MG/ML
INJECTION, SOLUTION INTRAVENOUS PRN
Status: DISCONTINUED | OUTPATIENT
Start: 2020-01-29 | End: 2020-01-29 | Stop reason: SDUPTHER

## 2020-01-29 RX ORDER — OXYCODONE HYDROCHLORIDE AND ACETAMINOPHEN 5; 325 MG/1; MG/1
1 TABLET ORAL PRN
Status: DISCONTINUED | OUTPATIENT
Start: 2020-01-29 | End: 2020-01-29

## 2020-01-29 RX ORDER — EPHEDRINE SULFATE 50 MG/ML
INJECTION INTRAVENOUS PRN
Status: DISCONTINUED | OUTPATIENT
Start: 2020-01-29 | End: 2020-01-29 | Stop reason: SDUPTHER

## 2020-01-29 RX ORDER — GABAPENTIN 300 MG/1
300 CAPSULE ORAL ONCE
Status: COMPLETED | OUTPATIENT
Start: 2020-01-29 | End: 2020-01-29

## 2020-01-29 RX ORDER — BUTALBITAL, ACETAMINOPHEN AND CAFFEINE 50; 325; 40 MG/1; MG/1; MG/1
1 TABLET ORAL EVERY 4 HOURS PRN
Status: DISCONTINUED | OUTPATIENT
Start: 2020-01-29 | End: 2020-01-31 | Stop reason: HOSPADM

## 2020-01-29 RX ORDER — DEXTROSE MONOHYDRATE 50 MG/ML
100 INJECTION, SOLUTION INTRAVENOUS PRN
Status: DISCONTINUED | OUTPATIENT
Start: 2020-01-29 | End: 2020-01-31 | Stop reason: HOSPADM

## 2020-01-29 RX ORDER — SODIUM CHLORIDE 0.9 % (FLUSH) 0.9 %
10 SYRINGE (ML) INJECTION PRN
Status: DISCONTINUED | OUTPATIENT
Start: 2020-01-29 | End: 2020-01-29

## 2020-01-29 RX ORDER — MIDAZOLAM HYDROCHLORIDE 1 MG/ML
INJECTION INTRAMUSCULAR; INTRAVENOUS PRN
Status: DISCONTINUED | OUTPATIENT
Start: 2020-01-29 | End: 2020-01-29 | Stop reason: SDUPTHER

## 2020-01-29 RX ORDER — PHENYLEPHRINE HCL IN 0.9% NACL 1 MG/10 ML
SYRINGE (ML) INTRAVENOUS PRN
Status: DISCONTINUED | OUTPATIENT
Start: 2020-01-29 | End: 2020-01-29 | Stop reason: SDUPTHER

## 2020-01-29 RX ORDER — TRANEXAMIC ACID 100 MG/ML
INJECTION, SOLUTION INTRAVENOUS PRN
Status: DISCONTINUED | OUTPATIENT
Start: 2020-01-29 | End: 2020-01-29 | Stop reason: SDUPTHER

## 2020-01-29 RX ORDER — CARBOXYMETHYLCELLULOSE SODIUM 10 MG/ML
1 GEL OPHTHALMIC 4 TIMES DAILY
Status: DISCONTINUED | OUTPATIENT
Start: 2020-01-29 | End: 2020-01-31 | Stop reason: HOSPADM

## 2020-01-29 RX ORDER — PROPOFOL 10 MG/ML
INJECTION, EMULSION INTRAVENOUS PRN
Status: DISCONTINUED | OUTPATIENT
Start: 2020-01-29 | End: 2020-01-29 | Stop reason: SDUPTHER

## 2020-01-29 RX ORDER — DEXTROSE MONOHYDRATE 25 G/50ML
12.5 INJECTION, SOLUTION INTRAVENOUS PRN
Status: DISCONTINUED | OUTPATIENT
Start: 2020-01-29 | End: 2020-01-31 | Stop reason: HOSPADM

## 2020-01-29 RX ORDER — MORPHINE SULFATE 2 MG/ML
2 INJECTION, SOLUTION INTRAMUSCULAR; INTRAVENOUS EVERY 5 MIN PRN
Status: DISCONTINUED | OUTPATIENT
Start: 2020-01-29 | End: 2020-01-29

## 2020-01-29 RX ORDER — SODIUM CHLORIDE 0.9 % (FLUSH) 0.9 %
10 SYRINGE (ML) INJECTION EVERY 12 HOURS SCHEDULED
Status: DISCONTINUED | OUTPATIENT
Start: 2020-01-29 | End: 2020-01-31 | Stop reason: HOSPADM

## 2020-01-29 RX ORDER — ACETAMINOPHEN 325 MG/1
650 TABLET ORAL EVERY 6 HOURS
Status: DISCONTINUED | OUTPATIENT
Start: 2020-01-29 | End: 2020-01-31 | Stop reason: HOSPADM

## 2020-01-29 RX ORDER — ONDANSETRON 2 MG/ML
4 INJECTION INTRAMUSCULAR; INTRAVENOUS
Status: DISCONTINUED | OUTPATIENT
Start: 2020-01-29 | End: 2020-01-29

## 2020-01-29 RX ORDER — SODIUM CHLORIDE 0.9 % (FLUSH) 0.9 %
10 SYRINGE (ML) INJECTION PRN
Status: DISCONTINUED | OUTPATIENT
Start: 2020-01-29 | End: 2020-01-31 | Stop reason: HOSPADM

## 2020-01-29 RX ORDER — SIMVASTATIN 10 MG
5 TABLET ORAL NIGHTLY
Status: DISCONTINUED | OUTPATIENT
Start: 2020-01-29 | End: 2020-01-31 | Stop reason: HOSPADM

## 2020-01-29 RX ORDER — DIPHENHYDRAMINE HYDROCHLORIDE 50 MG/ML
12.5 INJECTION INTRAMUSCULAR; INTRAVENOUS
Status: DISCONTINUED | OUTPATIENT
Start: 2020-01-29 | End: 2020-01-29

## 2020-01-29 RX ORDER — POLYVINYL ALCOHOL 14 MG/ML
2 SOLUTION/ DROPS OPHTHALMIC PRN
Status: DISCONTINUED | OUTPATIENT
Start: 2020-01-29 | End: 2020-01-31 | Stop reason: HOSPADM

## 2020-01-29 RX ADMIN — ACETAMINOPHEN 1000 MG: 500 TABLET ORAL at 13:14

## 2020-01-29 RX ADMIN — SODIUM CHLORIDE, POTASSIUM CHLORIDE, SODIUM LACTATE AND CALCIUM CHLORIDE: 600; 310; 30; 20 INJECTION, SOLUTION INTRAVENOUS at 14:36

## 2020-01-29 RX ADMIN — AMLODIPINE BESYLATE 5 MG: 5 TABLET ORAL at 20:29

## 2020-01-29 RX ADMIN — Medication 500 MG: at 20:30

## 2020-01-29 RX ADMIN — SODIUM CHLORIDE, POTASSIUM CHLORIDE, SODIUM LACTATE AND CALCIUM CHLORIDE: 600; 310; 30; 20 INJECTION, SOLUTION INTRAVENOUS at 20:33

## 2020-01-29 RX ADMIN — SUGAMMADEX 100 MG: 100 INJECTION, SOLUTION INTRAVENOUS at 16:47

## 2020-01-29 RX ADMIN — SUGAMMADEX 100 MG: 100 INJECTION, SOLUTION INTRAVENOUS at 17:02

## 2020-01-29 RX ADMIN — BUPIVACAINE HYDROCHLORIDE 30 ML: 2.5 INJECTION, SOLUTION EPIDURAL; INFILTRATION; INTRACAUDAL; PERINEURAL at 14:07

## 2020-01-29 RX ADMIN — SIMVASTATIN 5 MG: 10 TABLET, FILM COATED ORAL at 20:30

## 2020-01-29 RX ADMIN — FENTANYL CITRATE 100 MCG: 50 INJECTION, SOLUTION INTRAMUSCULAR; INTRAVENOUS at 14:41

## 2020-01-29 RX ADMIN — GABAPENTIN 300 MG: 300 CAPSULE ORAL at 13:13

## 2020-01-29 RX ADMIN — FENTANYL CITRATE 50 MCG: 50 INJECTION, SOLUTION INTRAMUSCULAR; INTRAVENOUS at 15:39

## 2020-01-29 RX ADMIN — FENTANYL CITRATE 50 MCG: 50 INJECTION, SOLUTION INTRAMUSCULAR; INTRAVENOUS at 15:33

## 2020-01-29 RX ADMIN — CARBIDOPA AND LEVODOPA 1 TABLET: 25; 100 TABLET ORAL at 20:29

## 2020-01-29 RX ADMIN — EPHEDRINE SULFATE 5 MG: 50 INJECTION INTRAVENOUS at 16:33

## 2020-01-29 RX ADMIN — MIDAZOLAM HYDROCHLORIDE 4 MG: 2 INJECTION, SOLUTION INTRAMUSCULAR; INTRAVENOUS at 14:07

## 2020-01-29 RX ADMIN — ACETAMINOPHEN 650 MG: 325 TABLET ORAL at 20:30

## 2020-01-29 RX ADMIN — HYDROMORPHONE HYDROCHLORIDE 0.5 MG: 1 INJECTION, SOLUTION INTRAMUSCULAR; INTRAVENOUS; SUBCUTANEOUS at 17:38

## 2020-01-29 RX ADMIN — MONTELUKAST SODIUM 10 MG: 10 TABLET ORAL at 20:41

## 2020-01-29 RX ADMIN — Medication 80 MCG: at 14:58

## 2020-01-29 RX ADMIN — EPHEDRINE SULFATE 5 MG: 50 INJECTION INTRAVENOUS at 15:57

## 2020-01-29 RX ADMIN — TRANEXAMIC ACID 1000 MG: 100 INJECTION, SOLUTION INTRAVENOUS at 15:15

## 2020-01-29 RX ADMIN — DEXAMETHASONE SODIUM PHOSPHATE 10 MG: 4 INJECTION, SOLUTION INTRAMUSCULAR; INTRAVENOUS at 16:16

## 2020-01-29 RX ADMIN — PROPOFOL 100 MG: 10 INJECTION, EMULSION INTRAVENOUS at 14:41

## 2020-01-29 RX ADMIN — HYDROMORPHONE HYDROCHLORIDE 0.5 MG: 1 INJECTION, SOLUTION INTRAMUSCULAR; INTRAVENOUS; SUBCUTANEOUS at 17:57

## 2020-01-29 RX ADMIN — EPHEDRINE SULFATE 5 MG: 50 INJECTION INTRAVENOUS at 15:08

## 2020-01-29 RX ADMIN — DOCUSATE SODIUM 100 MG: 100 CAPSULE, LIQUID FILLED ORAL at 20:29

## 2020-01-29 RX ADMIN — LOSARTAN POTASSIUM 50 MG: 25 TABLET, FILM COATED ORAL at 20:29

## 2020-01-29 RX ADMIN — EPHEDRINE SULFATE 5 MG: 50 INJECTION INTRAVENOUS at 15:50

## 2020-01-29 RX ADMIN — ROCURONIUM BROMIDE 20 MG: 10 SOLUTION INTRAVENOUS at 15:33

## 2020-01-29 RX ADMIN — FENTANYL CITRATE 50 MCG: 50 INJECTION, SOLUTION INTRAMUSCULAR; INTRAVENOUS at 15:31

## 2020-01-29 RX ADMIN — HYDROMORPHONE HYDROCHLORIDE 0.25 MG: 1 INJECTION, SOLUTION INTRAMUSCULAR; INTRAVENOUS; SUBCUTANEOUS at 18:06

## 2020-01-29 RX ADMIN — LIDOCAINE HYDROCHLORIDE 40 MG: 10 INJECTION, SOLUTION INFILTRATION; PERINEURAL at 14:41

## 2020-01-29 RX ADMIN — CEFAZOLIN SODIUM 2 G: 10 INJECTION, POWDER, FOR SOLUTION INTRAVENOUS at 14:47

## 2020-01-29 RX ADMIN — CARBOXYMETHYLCELLULOSE SODIUM 1 DROP: 10 GEL OPHTHALMIC at 20:30

## 2020-01-29 RX ADMIN — SODIUM CHLORIDE, POTASSIUM CHLORIDE, SODIUM LACTATE AND CALCIUM CHLORIDE: 600; 310; 30; 20 INJECTION, SOLUTION INTRAVENOUS at 17:01

## 2020-01-29 RX ADMIN — HYDROMORPHONE HYDROCHLORIDE 0.5 MG: 1 INJECTION, SOLUTION INTRAMUSCULAR; INTRAVENOUS; SUBCUTANEOUS at 17:45

## 2020-01-29 RX ADMIN — SENNOSIDES AND DOCUSATE SODIUM 1 TABLET: 8.6; 5 TABLET ORAL at 20:29

## 2020-01-29 RX ADMIN — CELECOXIB 200 MG: 100 CAPSULE ORAL at 13:14

## 2020-01-29 RX ADMIN — ROCURONIUM BROMIDE 50 MG: 10 SOLUTION INTRAVENOUS at 14:41

## 2020-01-29 RX ADMIN — ONDANSETRON 4 MG: 2 INJECTION INTRAMUSCULAR; INTRAVENOUS at 16:16

## 2020-01-29 ASSESSMENT — PULMONARY FUNCTION TESTS
PIF_VALUE: 17
PIF_VALUE: 15
PIF_VALUE: 17
PIF_VALUE: 5
PIF_VALUE: 17
PIF_VALUE: 17
PIF_VALUE: 16
PIF_VALUE: 17
PIF_VALUE: 16
PIF_VALUE: 19
PIF_VALUE: 17
PIF_VALUE: 16
PIF_VALUE: 15
PIF_VALUE: 18
PIF_VALUE: 1
PIF_VALUE: 15
PIF_VALUE: 17
PIF_VALUE: 18
PIF_VALUE: 15
PIF_VALUE: 17
PIF_VALUE: 16
PIF_VALUE: 15
PIF_VALUE: 15
PIF_VALUE: 16
PIF_VALUE: 1
PIF_VALUE: 14
PIF_VALUE: 17
PIF_VALUE: 16
PIF_VALUE: 17
PIF_VALUE: 12
PIF_VALUE: 16
PIF_VALUE: 17
PIF_VALUE: 17
PIF_VALUE: 16
PIF_VALUE: 17
PIF_VALUE: 14
PIF_VALUE: 0
PIF_VALUE: 16
PIF_VALUE: 15
PIF_VALUE: 17
PIF_VALUE: 5
PIF_VALUE: 17
PIF_VALUE: 16
PIF_VALUE: 16
PIF_VALUE: 9
PIF_VALUE: 5
PIF_VALUE: 15
PIF_VALUE: 17
PIF_VALUE: 16
PIF_VALUE: 5
PIF_VALUE: 15
PIF_VALUE: 18
PIF_VALUE: 17
PIF_VALUE: 15
PIF_VALUE: 16
PIF_VALUE: 15
PIF_VALUE: 17
PIF_VALUE: 18
PIF_VALUE: 15
PIF_VALUE: 14
PIF_VALUE: 17
PIF_VALUE: 16
PIF_VALUE: 18
PIF_VALUE: 15
PIF_VALUE: 15
PIF_VALUE: 17
PIF_VALUE: 15
PIF_VALUE: 16
PIF_VALUE: 15
PIF_VALUE: 15
PIF_VALUE: 16
PIF_VALUE: 15
PIF_VALUE: 14
PIF_VALUE: 17
PIF_VALUE: 16
PIF_VALUE: 17
PIF_VALUE: 16
PIF_VALUE: 5
PIF_VALUE: 16
PIF_VALUE: 16
PIF_VALUE: 5
PIF_VALUE: 15
PIF_VALUE: 18
PIF_VALUE: 14
PIF_VALUE: 17
PIF_VALUE: 16
PIF_VALUE: 20
PIF_VALUE: 13
PIF_VALUE: 17
PIF_VALUE: 16
PIF_VALUE: 18
PIF_VALUE: 16
PIF_VALUE: 17
PIF_VALUE: 15
PIF_VALUE: 16
PIF_VALUE: 16
PIF_VALUE: 17
PIF_VALUE: 16
PIF_VALUE: 15
PIF_VALUE: 16
PIF_VALUE: 14
PIF_VALUE: 15
PIF_VALUE: 3
PIF_VALUE: 1
PIF_VALUE: 15
PIF_VALUE: 17
PIF_VALUE: 15
PIF_VALUE: 17
PIF_VALUE: 16
PIF_VALUE: 15
PIF_VALUE: 18
PIF_VALUE: 5
PIF_VALUE: 18
PIF_VALUE: 13
PIF_VALUE: 17
PIF_VALUE: 19
PIF_VALUE: 15
PIF_VALUE: 16
PIF_VALUE: 5
PIF_VALUE: 11
PIF_VALUE: 12
PIF_VALUE: 15
PIF_VALUE: 17
PIF_VALUE: 5
PIF_VALUE: 16
PIF_VALUE: 5
PIF_VALUE: 15
PIF_VALUE: 16
PIF_VALUE: 17
PIF_VALUE: 15
PIF_VALUE: 5
PIF_VALUE: 15
PIF_VALUE: 15
PIF_VALUE: 17
PIF_VALUE: 16
PIF_VALUE: 15
PIF_VALUE: 16
PIF_VALUE: 15
PIF_VALUE: 5
PIF_VALUE: 15
PIF_VALUE: 0
PIF_VALUE: 17
PIF_VALUE: 16
PIF_VALUE: 5
PIF_VALUE: 16
PIF_VALUE: 18
PIF_VALUE: 8

## 2020-01-29 ASSESSMENT — PAIN SCALES - GENERAL
PAINLEVEL_OUTOF10: 8
PAINLEVEL_OUTOF10: 9
PAINLEVEL_OUTOF10: 7
PAINLEVEL_OUTOF10: 6
PAINLEVEL_OUTOF10: 4
PAINLEVEL_OUTOF10: 8

## 2020-01-29 ASSESSMENT — PAIN - FUNCTIONAL ASSESSMENT
PAIN_FUNCTIONAL_ASSESSMENT: 0-10
PAIN_FUNCTIONAL_ASSESSMENT: PREVENTS OR INTERFERES WITH MANY ACTIVE NOT PASSIVE ACTIVITIES
PAIN_FUNCTIONAL_ASSESSMENT: 0-10

## 2020-01-29 ASSESSMENT — PAIN DESCRIPTION - DESCRIPTORS: DESCRIPTORS: ACHING

## 2020-01-29 ASSESSMENT — ENCOUNTER SYMPTOMS: SHORTNESS OF BREATH: 1

## 2020-01-29 NOTE — PROGRESS NOTES
Received report from Metz, CRNA and Tima farrell RN. VSS. Oral airway intact. 02 sats 100 % on room air. Will continue to monitor.

## 2020-01-29 NOTE — PROGRESS NOTES
Report given to [facility], RN for transfer of care. 4 Eyes Skin Assessment     The patient is being assess for   Post-Op Surgical    I agree that 2 RN's have performed a thorough Head to Toe Skin Assessment on the patient. ALL assessment sites listed below have been assessed. Areas assessed by both nurses:   []   Head, Face, and Ears   []   Shoulders, Back, and Chest, Abdomen  []   Arms, Elbows, and Hands   [x]   Coccyx, Sacrum, and Ischium  []   Legs, Feet, and Heels        No skin issues. Co-signer eSignature: Electronically signed by Jammie Calderón RN on 1/29/20 at 6:31 PM    Does the Patient have Skin Breakdown?   No          Dominguez Prevention initiated:  No   Wound Care Orders initiated:  No      WOC nurse consulted for Pressure Injury (Stage 3,4, Unstageable, DTI, NWPT, Complex wounds)and New or Established Ostomies:  No      Primary Nurse eSignature: Electronically signed by Gurpreet Ordonez RN on 1/29/20 at 7:07 PM

## 2020-01-29 NOTE — BRIEF OP NOTE
Brief Postoperative Note  ______________________________________________________________    Patient: Jeffrey Patton  YOB: 1945  MRN: [id number]  Date of Procedure: 1/29/2020    Pre-Op Diagnosis: FAILED LEFT BIPOLAR HEMIARTHROPLASTY WITH ASEPTIC LOOSENING OF THE FEMORAL COMPONENT AND ACETABULAR WEAR    Post-Op Diagnosis: Same       Procedure(s):  REVISION LEFT HEMIARTHROPLASTY TO LATERAL TOTAL HIP ARTHROPLASTY WITH CELLSAVER AND FASCIAILIACA BLOCK FOR PAIN CONTROL     [employer]  CPT CODE - 19716    Anesthesia: General    Surgeon(s):  Calvni Garcia MD    Assistant: Vaughn Restrepo PA-C; Sam Negrete MD     Estimated Blood Loss (mL): 249 CC     Complications: None    Specimens:   * No specimens in log *    Implants:  Implant Name Type Inv.  Item Serial No.  Lot No. LRB No. Used   SHELL ACET R3 3 HOLE 50 Hip SHELL ACET R3 3 HOLE 50  [place] [id number] Left 1   SCREW HIP [facility] SOUTH HEAD 6.5X20MM Screw/Plate/Nail/Trent SCREW HIP [facility] SOUTH HEAD 6.5X20MM  [place] [id number] Left 1   SCREW REFLECTION SPHERICAL HEAD 15MM Screw/Plate/Nail/Trent SCREW REFLECTION SPHERICAL HEAD 15MM  [place] [id number] Left 1   IMPL HIP LINER ACET 32X50 Hip IMPL HIP LINER ACET 32X50  [employer]: ENDOSCOPY [id number] Left 1   IMPL HIP FEM STEM SYN PORS SZ 12 150MM Hip IMPL HIP FEM STEM SYN PORS SZ 12 150MM  [place] [id number] Left 1   IMPL HIP FEM HEAD COBLT CHROME 12TO14 3 Hip IMPL HIP FEM HEAD COBLT CHROME 12TO14 3  [place] [id number] Left 1         Drains: NONE    Findings: AS Jonah Pollock MD  Date: 1/29/2020  Time: 4:21 PM

## 2020-01-29 NOTE — ANESTHESIA PRE PROCEDURE
Oxycodone Nausea And Vomiting     High blood pressure after taking accompanied with headache       Problem List:    Patient Active Problem List   Diagnosis Code    Hypertension I10    Hyperlipidemia E78.5    Parkinson disease ([facility] [address] Onetha Orchard Sjogren's syndrome ([facility] [address] M35.00    Allergic rhinitis J30.9    Reactive airway disease J45.909    Chronic rhinitis J31.0    Elevated blood protein E88.09    Precordial pain R07.2    MAE (dyspnea on exertion) R06.09    Unstable angina (HCC) I20.0    Coronary artery disease of native artery with stable angina pectoris (HCC) I25.118    Hip fracture, left, closed, initial encounter [facility] S72.002A    Osteopenia M85.80    Mass of left wrist R22.32    De Quervain's disease (radial styloid tenosynovitis) M65.4    History of hemiarthroplasty of left hip Z96.642    Pain of left hip joint M25.552       Past Medical History:        Diagnosis Date    Allergic rhinitis     Arthritis     CAD (coronary artery disease)     Fractures     Hyperlipidemia     Hypertension     Migraine headache     since 20's    Parkinson disease ([facility] [address]     Reactive airway disease 2008    Shingles 10/2019    GROIN LEFT    Sjogren's syndrome ([facility] [address] 2012    Dr. Arcos Began       Past Surgical History:        Procedure Laterality Date    ARTHROGRAPHY Left 1/8/2020    LEFT HIP ASPIRATION WITH SYNOVASURE  CPT CODE - 20540 performed by Alex Welch MD at [address] [facility]    COLONOSCOPY  09/15/10    Dr. Ortega Delgadillo  12/22/2016    DENTAL SURGERY  12/4/20    TEETH EXTRACTION    DUPUYTRENS CONTRACTURE SURGERY Left 6/17/2019    LEFT DEQUERVAIN'S RELEASE performed by Claudette Weinstein MD at [facility] / FINGER Left 6/17/2019    EXCISION LEFT WRIST GANGLION performed by Claudette Weinstein MD at [address] Left     broke the femur    HIP SURGERY      JOINT REPLACEMENT Left 04/2018    HIP    SINUS SURGERY  07/2015    TUBAL LIGATION  1972       Social History:    Social History     Tobacco Use    Smoking status: Never Smoker    Smokeless tobacco: Never Used   Substance Use Topics    Alcohol use: No                                Counseling given: Not Answered      Vital Signs (Current):   Vitals:    01/21/20 1439 01/29/20 1245 01/29/20 1406 01/29/20 1409   BP:  124/76 131/78 127/72   Pulse:  55 57 63   Resp:  14     Temp:  98.4 °F (36.9 °C)     TempSrc:  Temporal     SpO2:  99% 100% 100%   Weight: 120 lb (54.4 kg) 119 lb (54 kg)     Height:  5' 2\" (1.575 m)                                                BP Readings from Last 3 Encounters:   01/29/20 127/72   01/09/20 116/66   01/08/20 116/66       NPO Status: Time of last liquid consumption: 1300(small sips for Sjogrens)                        Time of last solid consumption: 1700                        Date of last liquid consumption: 01/29/20                        Date of last solid food consumption: 01/28/20    BMI:   Wt Readings from Last 3 Encounters:   01/29/20 119 lb (54 kg)   01/09/20 129 lb (58.5 kg)   01/08/20 120 lb (54.4 kg)     Body mass index is 21.77 kg/m². CBC:   Lab Results   Component Value Date    WBC 3.6 12/23/2019    RBC 3.72 12/23/2019    HGB 12.8 12/23/2019    HCT 37.2 12/23/2019    .0 12/23/2019    RDW 13.1 12/23/2019     12/23/2019       CMP:   Lab Results   Component Value Date     01/07/2020    K 4.4 01/07/2020    K 3.7 04/23/2018     01/07/2020    CO2 23 01/07/2020    BUN 15 01/07/2020    CREATININE 0.6 01/07/2020    GFRAA >60 01/07/2020    AGRATIO 1.1 01/22/2019    LABGLOM >60 01/07/2020    GLUCOSE 93 01/07/2020    PROT 8.7 01/22/2019    CALCIUM 9.3 01/07/2020    BILITOT 0.6 01/22/2019    ALKPHOS 74 01/22/2019    AST 25 01/22/2019    ALT 6 01/22/2019       POC Tests: No results for input(s): POCGLU, POCNA, POCK, POCCL, POCBUN, POCHEMO, POCHCT in the last 72 hours.     Coags:   Lab Results   Component

## 2020-01-30 PROBLEM — Z96.642 STATUS POST TOTAL REPLACEMENT OF LEFT HIP: Status: ACTIVE | Noted: 2020-01-30

## 2020-01-30 LAB
ANION GAP SERPL CALCULATED.3IONS-SCNC: 10 MMOL/L (ref 3–16)
BASOPHILS ABSOLUTE: 0 K/UL (ref 0–0.2)
BASOPHILS RELATIVE PERCENT: 0.1 %
BUN BLDV-MCNC: 12 MG/DL (ref 7–20)
CALCIUM SERPL-MCNC: 8.4 MG/DL (ref 8.3–10.6)
CHLORIDE BLD-SCNC: 97 MMOL/L (ref 99–110)
CO2: 26 MMOL/L (ref 21–32)
CREAT SERPL-MCNC: 0.6 MG/DL (ref 0.6–1.2)
EOSINOPHILS ABSOLUTE: 0 K/UL (ref 0–0.6)
EOSINOPHILS RELATIVE PERCENT: 0 %
GFR AFRICAN AMERICAN: >60
GFR NON-AFRICAN AMERICAN: >60
GLUCOSE BLD-MCNC: 133 MG/DL (ref 70–99)
GLUCOSE BLD-MCNC: 134 MG/DL (ref 70–99)
GLUCOSE BLD-MCNC: 146 MG/DL (ref 70–99)
GLUCOSE BLD-MCNC: 156 MG/DL (ref 70–99)
GLUCOSE BLD-MCNC: 157 MG/DL (ref 70–99)
HCT VFR BLD CALC: 33.2 % (ref 36–48)
HEMOGLOBIN: 11.1 G/DL (ref 12–16)
LYMPHOCYTES ABSOLUTE: 0.6 K/UL (ref 1–5.1)
LYMPHOCYTES RELATIVE PERCENT: 9.7 %
MCH RBC QN AUTO: 33.5 PG (ref 26–34)
MCHC RBC AUTO-ENTMCNC: 33.4 G/DL (ref 31–36)
MCV RBC AUTO: 100.2 FL (ref 80–100)
MONOCYTES ABSOLUTE: 0.4 K/UL (ref 0–1.3)
MONOCYTES RELATIVE PERCENT: 6.1 %
NEUTROPHILS ABSOLUTE: 5.2 K/UL (ref 1.7–7.7)
NEUTROPHILS RELATIVE PERCENT: 84.1 %
PDW BLD-RTO: 12.3 % (ref 12.4–15.4)
PERFORMED ON: ABNORMAL
PLATELET # BLD: 151 K/UL (ref 135–450)
PMV BLD AUTO: 7.4 FL (ref 5–10.5)
POTASSIUM REFLEX MAGNESIUM: 4.6 MMOL/L (ref 3.5–5.1)
RBC # BLD: 3.31 M/UL (ref 4–5.2)
SODIUM BLD-SCNC: 133 MMOL/L (ref 136–145)
WBC # BLD: 6.1 K/UL (ref 4–11)

## 2020-01-30 PROCEDURE — 6360000002 HC RX W HCPCS: Performed by: PHYSICIAN ASSISTANT

## 2020-01-30 PROCEDURE — 85025 COMPLETE CBC W/AUTO DIFF WBC: CPT

## 2020-01-30 PROCEDURE — APPNB30 APP NON BILLABLE TIME 0-30 MINS: Performed by: PHYSICIAN ASSISTANT

## 2020-01-30 PROCEDURE — 2580000003 HC RX 258: Performed by: PHYSICIAN ASSISTANT

## 2020-01-30 PROCEDURE — 97116 GAIT TRAINING THERAPY: CPT

## 2020-01-30 PROCEDURE — 97165 OT EVAL LOW COMPLEX 30 MIN: CPT

## 2020-01-30 PROCEDURE — 80048 BASIC METABOLIC PNL TOTAL CA: CPT

## 2020-01-30 PROCEDURE — 97530 THERAPEUTIC ACTIVITIES: CPT

## 2020-01-30 PROCEDURE — 97162 PT EVAL MOD COMPLEX 30 MIN: CPT

## 2020-01-30 PROCEDURE — 94761 N-INVAS EAR/PLS OXIMETRY MLT: CPT

## 2020-01-30 PROCEDURE — 1200000000 HC SEMI PRIVATE

## 2020-01-30 PROCEDURE — 2700000000 HC OXYGEN THERAPY PER DAY

## 2020-01-30 PROCEDURE — 97535 SELF CARE MNGMENT TRAINING: CPT

## 2020-01-30 PROCEDURE — 6370000000 HC RX 637 (ALT 250 FOR IP): Performed by: PHYSICIAN ASSISTANT

## 2020-01-30 PROCEDURE — 36415 COLL VENOUS BLD VENIPUNCTURE: CPT

## 2020-01-30 PROCEDURE — 6370000000 HC RX 637 (ALT 250 FOR IP): Performed by: NURSE PRACTITIONER

## 2020-01-30 PROCEDURE — 97110 THERAPEUTIC EXERCISES: CPT

## 2020-01-30 PROCEDURE — 6360000002 HC RX W HCPCS: Performed by: NURSE PRACTITIONER

## 2020-01-30 RX ORDER — PROCHLORPERAZINE EDISYLATE 5 MG/ML
10 INJECTION INTRAMUSCULAR; INTRAVENOUS EVERY 6 HOURS PRN
Status: DISCONTINUED | OUTPATIENT
Start: 2020-01-30 | End: 2020-01-31 | Stop reason: HOSPADM

## 2020-01-30 RX ORDER — SCOLOPAMINE TRANSDERMAL SYSTEM 1 MG/1
1 PATCH, EXTENDED RELEASE TRANSDERMAL
Status: DISCONTINUED | OUTPATIENT
Start: 2020-01-30 | End: 2020-01-30

## 2020-01-30 RX ORDER — ONDANSETRON 4 MG/1
4 TABLET, ORALLY DISINTEGRATING ORAL EVERY 8 HOURS PRN
Qty: 30 TABLET | Refills: 0 | Status: SHIPPED | OUTPATIENT
Start: 2020-01-30 | End: 2021-03-01 | Stop reason: ALTCHOICE

## 2020-01-30 RX ORDER — HYDRALAZINE HYDROCHLORIDE 20 MG/ML
5 INJECTION INTRAMUSCULAR; INTRAVENOUS ONCE
Status: DISCONTINUED | OUTPATIENT
Start: 2020-01-30 | End: 2020-01-30

## 2020-01-30 RX ORDER — ASPIRIN 325 MG
325 TABLET, DELAYED RELEASE (ENTERIC COATED) ORAL 2 TIMES DAILY
Qty: 60 TABLET | Refills: 0 | Status: SHIPPED | OUTPATIENT
Start: 2020-01-30 | End: 2021-03-01 | Stop reason: ALTCHOICE

## 2020-01-30 RX ORDER — HYDROCODONE BITARTRATE AND ACETAMINOPHEN 5; 325 MG/1; MG/1
1 TABLET ORAL EVERY 6 HOURS PRN
Qty: 28 TABLET | Refills: 0 | Status: SHIPPED | OUTPATIENT
Start: 2020-01-30 | End: 2020-02-06

## 2020-01-30 RX ORDER — ISOSORBIDE MONONITRATE 30 MG/1
30 TABLET, EXTENDED RELEASE ORAL DAILY
Status: DISCONTINUED | OUTPATIENT
Start: 2020-01-30 | End: 2020-01-31 | Stop reason: HOSPADM

## 2020-01-30 RX ADMIN — ISOSORBIDE MONONITRATE 30 MG: 30 TABLET, EXTENDED RELEASE ORAL at 13:12

## 2020-01-30 RX ADMIN — MONTELUKAST SODIUM 10 MG: 10 TABLET ORAL at 21:43

## 2020-01-30 RX ADMIN — LOSARTAN POTASSIUM 50 MG: 25 TABLET, FILM COATED ORAL at 08:16

## 2020-01-30 RX ADMIN — SIMVASTATIN 5 MG: 10 TABLET, FILM COATED ORAL at 21:43

## 2020-01-30 RX ADMIN — ACETAMINOPHEN 650 MG: 325 TABLET ORAL at 04:36

## 2020-01-30 RX ADMIN — CEFAZOLIN SODIUM 2 G: 10 INJECTION, POWDER, FOR SOLUTION INTRAVENOUS at 00:02

## 2020-01-30 RX ADMIN — METOPROLOL TARTRATE 25 MG: 25 TABLET ORAL at 21:42

## 2020-01-30 RX ADMIN — CARBIDOPA AND LEVODOPA 1 TABLET: 25; 100 TABLET ORAL at 21:43

## 2020-01-30 RX ADMIN — ONDANSETRON 4 MG: 2 INJECTION INTRAMUSCULAR; INTRAVENOUS at 15:46

## 2020-01-30 RX ADMIN — SODIUM CHLORIDE, POTASSIUM CHLORIDE, SODIUM LACTATE AND CALCIUM CHLORIDE: 600; 310; 30; 20 INJECTION, SOLUTION INTRAVENOUS at 23:48

## 2020-01-30 RX ADMIN — Medication 500 MG: at 08:15

## 2020-01-30 RX ADMIN — CEFAZOLIN SODIUM 2 G: 10 INJECTION, POWDER, FOR SOLUTION INTRAVENOUS at 10:22

## 2020-01-30 RX ADMIN — CARBIDOPA AND LEVODOPA 1 TABLET: 25; 100 TABLET ORAL at 08:16

## 2020-01-30 RX ADMIN — METOPROLOL TARTRATE 25 MG: 25 TABLET ORAL at 10:08

## 2020-01-30 RX ADMIN — CARBIDOPA AND LEVODOPA 1 TABLET: 25; 100 TABLET ORAL at 18:41

## 2020-01-30 RX ADMIN — HYDROCODONE BITARTRATE AND ACETAMINOPHEN 2 TABLET: 5; 325 TABLET ORAL at 08:18

## 2020-01-30 RX ADMIN — ACETAMINOPHEN 650 MG: 325 TABLET ORAL at 21:43

## 2020-01-30 RX ADMIN — ONDANSETRON 4 MG: 2 INJECTION INTRAMUSCULAR; INTRAVENOUS at 09:37

## 2020-01-30 RX ADMIN — CARBIDOPA AND LEVODOPA 1 TABLET: 25; 100 TABLET ORAL at 13:13

## 2020-01-30 RX ADMIN — PROCHLORPERAZINE EDISYLATE 10 MG: 5 INJECTION INTRAMUSCULAR; INTRAVENOUS at 21:40

## 2020-01-30 RX ADMIN — AMLODIPINE BESYLATE 5 MG: 5 TABLET ORAL at 21:43

## 2020-01-30 RX ADMIN — APIXABAN 2.5 MG: 2.5 TABLET, FILM COATED ORAL at 08:16

## 2020-01-30 RX ADMIN — SODIUM CHLORIDE, POTASSIUM CHLORIDE, SODIUM LACTATE AND CALCIUM CHLORIDE: 600; 310; 30; 20 INJECTION, SOLUTION INTRAVENOUS at 15:46

## 2020-01-30 RX ADMIN — ONDANSETRON 4 MG: 2 INJECTION INTRAMUSCULAR; INTRAVENOUS at 00:02

## 2020-01-30 RX ADMIN — ACETAMINOPHEN 650 MG: 325 TABLET ORAL at 15:46

## 2020-01-30 ASSESSMENT — PAIN DESCRIPTION - PROGRESSION
CLINICAL_PROGRESSION: NOT CHANGED

## 2020-01-30 ASSESSMENT — PAIN SCALES - GENERAL
PAINLEVEL_OUTOF10: 5
PAINLEVEL_OUTOF10: 2
PAINLEVEL_OUTOF10: 5
PAINLEVEL_OUTOF10: 5
PAINLEVEL_OUTOF10: 0
PAINLEVEL_OUTOF10: 5

## 2020-01-30 ASSESSMENT — PAIN DESCRIPTION - DESCRIPTORS: DESCRIPTORS: ACHING

## 2020-01-30 ASSESSMENT — PAIN DESCRIPTION - LOCATION
LOCATION: HEAD

## 2020-01-30 ASSESSMENT — PAIN DESCRIPTION - PAIN TYPE
TYPE: ACUTE PAIN

## 2020-01-30 NOTE — ANESTHESIA POSTPROCEDURE EVALUATION
Department of Anesthesiology  Postprocedure Note    Patient: Dorinda Lynne  MRN: [id number]  YOB: 1945  Date of evaluation: 1/29/2020  Time:  7:11 PM     Procedure Summary     Date:  01/29/20 Room / Location:  [address] [place] [address]    Anesthesia Start:  7233 Anesthesia Stop:  7538    Procedure:  REVISION LEFT HEMIARTHROPLASTY TO LATERAL TOTAL HIP ARTHROPLASTY WITH CELLSAVER AND FASCIAILIACA BLOCK FOR PAIN CONTROL     [employer]  CPT CODE - 56967 (Left Hip) Diagnosis:       Primary osteoarthritis of left hip      (LEFT HIP OSTEOARTHRITIS)    Surgeon:  Jacobo Posada MD Responsible Provider:  Bertha Aparicio MD    Anesthesia Type:  general ASA Status:  3          Anesthesia Type: general    Daja Phase I: Daja Score: 10    Daja Phase II:      Last vitals: Reviewed and per EMR flowsheets. Anesthesia Post Evaluation    Patient location during evaluation: PACU  Patient participation: complete - patient participated  Level of consciousness: awake and alert  Airway patency: patent  Nausea & Vomiting: no nausea and no vomiting  Complications: no  Cardiovascular status: blood pressure returned to baseline  Respiratory status: acceptable  Hydration status: euvolemic  Comments: VSS on transfer to phase 2 recovery. No anesthetic complications.

## 2020-01-30 NOTE — PROGRESS NOTES
Physical Therapy  Facility/Department: [facility] C5 - MED SURG/ORTHO  Daily Treatment Note  NAME: Caron Trejo  : 1945  MRN: [id number]    Date of Service: 2020    Discharge Recommendations:  24 hour supervision or assist, Outpatient PT   PT Equipment Recommendations  Equipment Needed: No    Assessment   Body structures, Functions, Activity limitations: Decreased functional mobility ; Decreased endurance;Decreased ROM; Decreased strength;Decreased balance  Assessment: Pt is at mod I for bed mobility, SBA for sit<>stand and SBA to CGA for bed to chair with RW due to pt becoming nauseus with vomiting and lightheadedness. BP taken at /80. Gait distance limited due to HA, lightheadedness and N/V. Will cont per POC to address deficits. Treatment Diagnosis: decreased indep with mobility  Prognosis: Good  Decision Making: Medium Complexity  PT Education: Goals;Transfer Training;Equipment;PT Role;Functional Mobility Training;Plan of Care;Weight-bearing Education;Home Exercise Program;Precautions;Gait Training  Patient Education: pt verbalized understanding  Barriers to Learning: no  REQUIRES PT FOLLOW UP: Yes  Activity Tolerance  Activity Tolerance: Treatment limited secondary to medical complications (free text)  Activity Tolerance: Pt began feeling nauseus and lightheaded during transfer. BP taken supin ein bed prior to mobility 167/84; seated in chair after transfer 157/80; Pt vomited but reported feeling better after vomiting, left pt seated in chair with all needs within reach     Patient Diagnosis(es): The encounter diagnosis was Status post total replacement of left hip.     has a past medical history of Allergic rhinitis, Arthritis, CAD (coronary artery disease), Fractures, Hyperlipidemia, Hypertension, Migraine headache, Parkinson disease ([address] Reactive airway disease, Shingles, and Sjogren's syndrome ([facility] has a past surgical history that includes Colonoscopy (09/15/10);  Tubal ligation (1972); Hip fracture surgery (Left); sinus surgery (07/2015); Coronary angioplasty (12/22/2016); dupuytrens contracture surgery (Left, 6/17/2019); EXCISION LESION HAND / FINGER (Left, 6/17/2019); hip surgery; Breast surgery (Left, 1970); joint replacement (Left, 04/2018); Dental surgery (12/4/20); arthrography (Left, 1/8/2020); and Revision total hip arthroplasty (Left, 1/29/2020). Restrictions  Restrictions/Precautions  Restrictions/Precautions: ROM Restrictions, General Precautions  Required Braces or Orthoses?: No  Lower Extremity Weight Bearing Restrictions  Left Lower Extremity Weight Bearing: Weight Bearing As Tolerated  Position Activity Restriction  Hip Precautions: No active ABduction, No hip extension, No hip external rotation, No ADduction  Other position/activity restrictions: AL approach  Subjective   General  Chart Reviewed: Yes  Response To Previous Treatment: Patient reporting fatigue but able to participate. Family / Caregiver Present: No  Referring Practitioner: Gail Rios MD  Subjective  Subjective: Pt agreeable to PT  General Comment  Comments: Pt resting in bed upon entry, RN cleared pt for therapy  Pain Screening  Patient Currently in Pain: Yes  Pain Assessment  Pain Assessment: 0-10  Pain Level: 5  Pain Type: Acute pain  Pain Location: Head  Non-Pharmaceutical Pain Intervention(s): Ambulation/Increased Activity;Repositioned; Therapeutic presence(pt reporting no hip pain, placed ice preventatively)  Vital Signs  Patient Currently in Pain: Yes  Pre Treatment Pain Screening  Intervention List: Patient able to continue with treatment    Orientation  Orientation  Overall Orientation Status: Within Normal Limits       Objective   Bed mobility  Supine to Sit: Supervision  Sit to Supine: Unable to assess(up in chair at EOS)  Scooting: Supervision(to EOB)  Transfers  Sit to Stand: Stand by assistance  Stand to sit: Stand by assistance  Bed to Chair: Stand by assistance(with

## 2020-01-30 NOTE — CONSULTS
Hospital Medicine  Consult History & Physical        Chief Complaint:  Left hip pain    Date of Service: Pt seen/examined in consultation on 1/30/20    History Of Present Illness:      76 y.o. female who we are asked to see/evaluate by Lizzy Pop MD for medical management of Parkinson's, HTN, HLD. The patient reports a hx of progressive left hip pain 2nd to OA that is rated 10/10, worse with ambulation and relieved to some degree by rest and analgesic medication. This has progressed to the point that the analgesic meds are minimally effective and the patient decided to undergo an elective left total hip arthroplasty conversion. The patient reports good post-operative pain control w/out associated fevers/chills or other signs/sxs of systemic illness. The patient denies any associated cardiopulmonary c/o such as CP/SOB.  Post-op course complicated by N/V, to which the patient believes is from taking 2 [place].       Past Medical History:        Diagnosis Date    Allergic rhinitis     Arthritis     CAD (coronary artery disease)     Fractures     Hyperlipidemia     Hypertension     Migraine headache     since 20's    Parkinson disease ([address]     Reactive airway disease 2008    Shingles 10/2019    GROIN LEFT    Sjogren's syndrome ([address] 2012    Dr. Honey mAador       Past Surgical History:        Procedure Laterality Date    ARTHROGRAPHY Left 1/8/2020    LEFT HIP ASPIRATION WITH SYNOVASURE  CPT CODE - 63407 performed by Lizzy Pop MD at [address] COLONOSCOPY  09/15/10    Dr. Annie Smith  12/22/2016    DENTAL SURGERY  12/4/20    TEETH EXTRACTION    DUPUYTRENS CONTRACTURE SURGERY Left 6/17/2019    LEFT DEQUERVAIN'S RELEASE performed by Gage Macdonald MD at [address] / FINGER Left 6/17/2019    EXCISION LEFT WRIST GANGLION performed by Gage Macdonald MD at [address] Left     broke the femur    HIP SURGERY      JOINT REPLACEMENT Left 04/2018    HIP    REVISION TOTAL HIP ARTHROPLASTY Left 1/29/2020    REVISION LEFT HEMIARTHROPLASTY TO LATERAL TOTAL HIP ARTHROPLASTY WITH CELLSAVER AND FASCIAILIACA BLOCK FOR PAIN CONTROL     MONTANA & NEPHEW  CPT CODE - 45201 performed by Adrianne Lawler MD at [address] [facility]  07/2015    TUBAL LIGATION  1972       Medications Prior to Admission:    Prior to Admission medications    Medication Sig Start Date End Date Taking? Authorizing Provider   HYDROcodone-acetaminophen (NORCO) 5-325 MG per tablet Take 1 tablet by mouth every 6 hours as needed for Pain for up to 7 days.  1/30/20 2/6/20 Yes ERIKA Monroy   aspirin 325 MG EC tablet Take 1 tablet by mouth 2 times daily 1/30/20 2/29/20 Yes ERIKA Monroy   ondansetron (ZOFRAN ODT) 4 MG disintegrating tablet Take 1 tablet by mouth every 8 hours as needed for Nausea or Vomiting 1/30/20  Yes ERIKA Monroy   carboxymethylcellulose 1 % ophthalmic solution Place 1 drop into both eyes 4 times daily   Yes Historical Provider, MD   carbidopa-levodopa (SINEMET)  MG per tablet TAKE 1 TABLET BY MOUTH 4 TIMES DAILY 12/13/19  Yes Eboni Guthrie MD   amLODIPine (NORVASC) 5 MG tablet TAKE 1 TABLET BY MOUTH ONCE DAILY  Patient taking differently: nightly  12/3/19  Yes Edwar Newman MD   montelukast (SINGULAIR) 10 MG tablet TAKE 1 TABLET BY MOUTH NIGHTLY 10/11/19  Yes Edwar Newman MD   isosorbide mononitrate (IMDUR) 30 MG extended release tablet TAKE 1 TABLET BY MOUTH ONCE DAILY (NEED OFFICE VISIT) 10/11/19  Yes Edwar Newman MD   lovastatin (MEVACOR) 20 MG tablet Take 1 tablet by mouth nightly 9/12/19  Yes Edwar Newman MD   losartan (COZAAR) 50 MG tablet Take 1 tablet by mouth daily 8/27/19  Yes Edwar Newman MD   metoprolol tartrate (LOPRESSOR) 25 MG tablet TAKE ONE TABLET BY MOUTH TWICE A DAY 6/24/19  Yes Edwar Newman MD   Calcium Carbonate-Vitamin D (OYSTER SHELL CALCIUM/D) 500-200 MG-UNIT TABS TAKE 1 TABLET BY MOUTH ONCE DAILY 2/8/19  Yes HIRAM Singleton - CNP   polyvinyl alcohol-povidone (HYPOTEARS) 1.4-0.6 % ophthalmic solution 1-2 drops as needed. Yes Historical Provider, MD   denosumab (PROLIA) 60 MG/ML SOLN SC injection Inject 1 mL into the skin every 6 months  Patient not taking: Reported on 1/9/2020 2/21/19   Eulas Soulier, MD   guaiFENesin (MUCINEX) 600 MG extended release tablet Take 1,200 mg by mouth as needed     Historical Provider, MD   nitroGLYCERIN (NITROSTAT) 0.4 MG SL tablet Place 1 tablet under the tongue every 5 minutes as needed for Chest pain  Patient not taking: Reported on 1/9/2020 1/12/17   Trevor Montgomery MD   Multiple Vitamins-Minerals (THERAPEUTIC MULTIVITAMIN-MINERALS) tablet Take 1 tablet by mouth daily    Historical Provider, MD   butalbital-acetaminophen-caffeine (FIORICET, ESGIC) -40 MG per tablet Take 1 tablet by mouth every 4 hours as needed for Headaches    Historical Provider, MD   meloxicam (MOBIC) 15 MG tablet Take 15 mg by mouth daily    Historical Provider, MD   mometasone (NASONEX) 50 MCG/ACT nasal spray 2 sprays by Nasal route daily. Each nostril  Patient not taking: Reported on 1/9/2020 10/17/13   Gianfranco Vicente MD       Allergies:  Ranolazine; Amoxicillin; and Oxycodone    Social History:      The patient currently lives with her spouse. TOBACCO:   reports that she has never smoked. She has never used smokeless tobacco.  ETOH:   reports no history of alcohol use. Family History:     Reviewed in detail. Positive as follows:        Problem Relation Age of Onset    Heart Attack Father     High Cholesterol Father     Heart Disease Father     High Cholesterol Mother        REVIEW OF SYSTEMS:   Pertinent positives as noted in the HPI. All other systems reviewed and negative.     PHYSICAL EXAM PERFORMED:  /76   Pulse 71   Temp 98.4 °F (36.9 °C) (Oral)   Resp 18   Ht 5' 2\" (1.575 m)   Wt 119 lb (54 kg)   SpO2 99%   BMI 21.77 kg/m²   General appearance: No apparent distress, appears stated age and cooperative. HEENT: Normal cephalic, atraumatic without obvious deformity. Pupils equal, round, and reactive to light. Extra ocular muscles intact. Conjunctivae/corneas clear. Neck: Supple, with full range of motion. No jugular venous distention. Trachea midline. Respiratory:  Normal respiratory effort. Clear to auscultation, bilaterally without Rales/Wheezes/Rhonchi. Cardiovascular: Regular rate and rhythm with normal S1/S2 without murmurs, rubs or gallops. Abdomen: Soft, non-tender, non-distended with normal bowel sounds. Musculoskeletal: Decreased ROM LLE. No clubbing, cyanosis or edema bilaterally. Skin: Skin color, texture, turgor normal.  No rashes or lesions. Neurologic:  Neurovascularly intact without any focal sensory/motor deficits. Cranial nerves: II-XII intact, grossly non-focal.  Psychiatric: Alert and oriented, thought content appropriate, normal insight  Capillary Refill: Brisk,< 3 seconds   Peripheral Pulses: +2 palpable, equal bilaterally     Labs:     Recent Labs     01/30/20  0553   WBC 6.1   HGB 11.1*   HCT 33.2*        Recent Labs     01/30/20  0553   *   K 4.6   CL 97*   CO2 26   BUN 12   CREATININE 0.6   CALCIUM 8.4     No results for input(s): AST, ALT, BILIDIR, BILITOT, ALKPHOS in the last 72 hours. No results for input(s): INR in the last 72 hours. No results for input(s): Francois Squire in the last 72 hours.     Urinalysis:    Lab Results   Component Value Date    NITRU Negative 01/07/2020    BLOODU Negative 01/07/2020    SPECGRAV <=1.005 01/07/2020    GLUCOSEU Negative 01/07/2020       Radiology: I have reviewed the radiology reports with the following interpretation:     FLUORO FOR SURGICAL PROCEDURES    (Results Pending)          ASSESSMENT:    Active Hospital Problems    Diagnosis Date Noted    Status post total replacement of left hip [Z96.642] 01/30/2020    History of left hip

## 2020-01-30 NOTE — OP NOTE
implants were noted. Final  x-rays were taken and saved for permanent record.         Tameka Zuñiga MD    D: 01/29/2020 16:30:12       T: 01/29/2020 23:19:51     [email or web address]  Job#: [id number]     Doc#: [id number]    CC:  Bryson Ennis MD

## 2020-01-30 NOTE — PROGRESS NOTES
Diagnosis(es): There were no encounter diagnoses. has a past medical history of Allergic rhinitis, Arthritis, CAD (coronary artery disease), Fractures, Hyperlipidemia, Hypertension, Migraine headache, Parkinson disease ([facility] Reactive airway disease, Shingles, and Sjogren's syndrome ([facility] has a past surgical history that includes Colonoscopy (09/15/10); Tubal ligation (1972); Hip fracture surgery (Left); sinus surgery (07/2015); Coronary angioplasty (12/22/2016); dupuytrens contracture surgery (Left, 6/17/2019); EXCISION LESION HAND / FINGER (Left, 6/17/2019); hip surgery; Breast surgery (Left, 1970); joint replacement (Left, 04/2018); Dental surgery (12/4/20); arthrography (Left, 1/8/2020); and Revision total hip arthroplasty (Left, 1/29/2020). Restrictions  Restrictions/Precautions  Restrictions/Precautions: ROM Restrictions, General Precautions  Required Braces or Orthoses?: No  Lower Extremity Weight Bearing Restrictions  Left Lower Extremity Weight Bearing: Weight Bearing As Tolerated    Subjective   General  Chart Reviewed: Yes  Patient assessed for rehabilitation services?: Yes  Family / Caregiver Present: Yes  Referring Practitioner: Anca Murillo PA-C  Diagnosis: Primary OA of left hip; Anterolateral hip replacement 1/29/20. Subjective  Subjective: Pt pleasant and agreeable to therapy. Pt stated she had a headache as therapy arrived and that she had not eaten this morning. Pt had spouse and daughter present during therapy session.      Patient Currently in Pain: (pt did not provide numerical pain rating. )  Pain Assessment  Pain Type: Acute pain  Pain Location: Head  Pain Descriptors: Aching  Vital Signs  Patient Currently in Pain: (pt did not provide numerical pain rating. )    Social/Functional History  Social/Functional History  Lives With: Spouse  Type of Home: House  Home Layout: One level, Able to Live on Main level with bedroom/bathroom, Performs ADL's on one level  Home Access: Stairs assistance(at EOB for safety and assisting with the pants getting stuck on hospital socks. )  Toileting: Stand by assistance     Bed mobility  Supine to Sit: Supervision  Sit to Supine: Supervision  Transfers  Sit to stand: Stand by assistance(up to RW)  Stand to sit: Stand by assistance(Pt required prompting to reach back to the bed and not hold the walker when transfer from standing at RW to sitting EOB.)     Car Transfers  Car Transfers: Pt and family educated on how to safely transfer into and out of car. Pt and family verbalized understanding. Plan   Plan  Times per week: 1x only   Current Treatment Recommendations: Safety Education & Training, Patient/Caregiver Education & Training, Functional Mobility Training, Endurance Training, Self-Care / ADL, Strengthening    AM-PAC Score  AM-PAC Inpatient Daily Activity Raw Score: 20 (01/30/20 1122)  AM-PAC Inpatient ADL T-Scale Score : 42.03 (01/30/20 1122)  ADL Inpatient CMS 0-100% Score: 38.32 (01/30/20 1122)  ADL Inpatient CMS G-Code Modifier : Pily Stapleton (01/30/20 1122)    Goals  Short term goals  Time Frame for Short term goals: 1 week unless otherwise specified by 2/6/2020  Short term goal 1: Pt will complete toileting with SBA by 1/31/2020.  GOAL MET     Therapy Time   Individual Concurrent Group Co-treatment   Time In 0827         Time Out 0908         Minutes 41         Timed Code Treatment Minutes: [address] [facility]. S/OT

## 2020-01-30 NOTE — CARE COORDINATION
DCP met with Leslie JAMES Re: : s/p THR. Pt has all DME and OP therapy set up at this time. No further needs identified by DCP. Please contact should further needs arise.  Tung Ghotra RN

## 2020-01-30 NOTE — PROGRESS NOTES
--   01/29/20 1415 126/66 -- -- 56 -- 99 % -- --   01/29/20 1412 126/66 -- -- 59 -- 95 % -- --   01/29/20 1409 127/72 -- -- 63 -- 100 % -- --   01/29/20 1406 131/78 -- -- 57 -- 100 % -- --   01/29/20 1245 124/76 98.4 °F (36.9 °C) Temporal 55 14 99 % 5' 2\" (1.575 m) 119 lb (54 kg)       General: alert, appears stated age, cooperative and no distress   Wound: Wound clean and dry no evidence of infection. Motion: Painful range of Motion   DVT Exam: No evidence of DVT seen on physical exam.       NVI in lower extremity. Thigh swollen but soft. Moving foot and ankle. Data Review  CBC:   Lab Results   Component Value Date    WBC 6.1 01/30/2020    RBC 3.31 01/30/2020    HGB 11.1 01/30/2020    HCT 33.2 01/30/2020     01/30/2020       Assessment:     Status Post left Total Hip Arthroplasty conversion from bipolar. Doing well postoperatively. Plan:      1: Continues current post-op course, IM to follow. Labs reviewed and stable today. Vitals reviewed. N/V this am from narcotic, antiemetics as needed. IVF stopped and encouraged PO intake as patient wishes to d/c home today. 2:  Continue Deep venous thrombosis prophylaxis- Eliquis. Plan for ASA at home per OV  3:  Continue physical therapy and OT, WBAT  4:  Continue Pain Control PRN, medications adjusted  5:  D/c planning for home with delayed outpatient therapy. Pt has walker. DCP updated. 6:  D/c home today vs tomorrow pending progress with therapy and N/V. Discussed with pt and family.       Miguel Gottlieb PA-C

## 2020-01-30 NOTE — PROGRESS NOTES
Physical Therapy    Facility/Department: [facility] C5 - MED SURG/ORTHO  Initial Assessment and treatment    NAME: Caron Trejo  : 1945  MRN: [id number]    Date of Service: 2020    Discharge Recommendations:  24 hour supervision or assist, Outpatient PT   PT Equipment Recommendations  Equipment Needed: No    Assessment   Body structures, Functions, Activity limitations: Decreased functional mobility ; Decreased endurance;Decreased ROM; Decreased strength;Decreased balance  Assessment: Pt referred for PT evaluation during current hospital stay POD #1 left KARLOS. Pt currently functioning below baseline, requiring supervision for bed mobility, CGA for transfers and gait with RW. Pt mildly unsteady with gait 12 ft x 2 with RW but no LOB. Gait distance limited by symtomatic high BP with nausea and lightheadedness/HA. PT would benefit from skilled acute PT to address deficits. Recommend home with 24 hr sup/assist and OPPT per MD discretion  Treatment Diagnosis: decreased indep with mobility  Prognosis: Good  Decision Making: Medium Complexity  PT Education: Goals;Transfer Training;Equipment;PT Role;Functional Mobility Training;Plan of Care;Weight-bearing Education;Home Exercise Program;Precautions;Gait Training  Patient Education: pt and  verbalized understanding  Barriers to Learning: no  REQUIRES PT FOLLOW UP: Yes  Activity Tolerance  Activity Tolerance: Patient Tolerated treatment well;Treatment limited secondary to medical complications (free text)  Activity Tolerance: Pt began feeling nauseus and lightheaded while on toilet, family reports this happens when BP increases. BP taken seated /85; supine in bed 188/83; after 5 min resting in bed 171/80; RN aware       Patient Diagnosis(es): There were no encounter diagnoses.      has a past medical history of Allergic rhinitis, Arthritis, CAD (coronary artery disease), Fractures, Hyperlipidemia, Hypertension, Migraine headache, Parkinson disease ([facility] [address] Reactive airway disease, Shingles, and S[facility]ogren's syndrome ([facility] has a past surgical history that includes Colonoscopy (09/15/10); Tubal ligation (1972); Hip fracture surgery (Left); sinus surgery (07/2015); Coronary angioplasty (12/22/2016); dupuytrens contracture surgery (Left, 6/17/2019); EXCISION LESION HAND / FINGER (Left, 6/17/2019); hip surgery; Breast surgery (Left, 1970); joint replacement (Left, 04/2018); Dental surgery (12/4/20); arthrography (Left, 1/8/2020); and Revision total hip arthroplasty (Left, 1/29/2020). Restrictions  Restrictions/Precautions  Restrictions/Precautions: ROM Restrictions, General Precautions  Required Braces or Orthoses?: No  Lower Extremity Weight Bearing Restrictions  Left Lower Extremity Weight Bearing: Weight Bearing As Tolerated  Position Activity Restriction  Hip Precautions: No active ABduction, No hip extension, No hip external rotation, No ADduction  Other position/activity restrictions: AL approach  Vision/Hearing  Vision: Impaired  Vision Exceptions: Wears glasses at all times  Hearing: Within functional limits     Subjective  General  Chart Reviewed: Yes  Patient assessed for rehabilitation services?: Yes  Response To Previous Treatment: Not applicable  Family / Caregiver Present: Yes( and daughter)  Referring Practitioner: Monica Hansen MD  Referral Date : 01/29/20  Diagnosis: L hip OA, s/p KARLOS anterolateral approach  Follows Commands: Within Functional Limits  General Comment  Comments: Pt resting in bed upon entry, RN cleared pt for therapy  Subjective  Subjective: Pt agreeable to PT  Pain Screening  Patient Currently in Pain: Yes(pt did not rate)  Pain Assessment  Pain Type: Acute pain  Pain Location: Head  Non-Pharmaceutical Pain Intervention(s): Ambulation/Increased Activity;Repositioned; Therapeutic presence  Vital Signs  Patient Currently in Pain: Yes(pt did not rate)  Pre Treatment Pain Screening  Intervention List: Patient able to continue Walker  Assistance: Stand by assistance  Quality of Gait: step to pattern, mildly unsteady but no overt LOB  Distance: 12 ft x 2  Comments: Pt began feeling nauseus and lightheaded while on toilet, family reports this happens when BP increases. BP taken seated /85; supine in bed 188/83; after 5 min resting in bed 171/80; RN aware     Balance  Posture: Fair  Sitting - Static: Good  Sitting - Dynamic: Good  Standing - Static: Fair;+  Standing - Dynamic: Fair;+  Exercises  Quad Sets: x 10 B  Heelslides: x 10 L  Gluteal Sets: x 10 B  Knee Short Arc Quad: x 10 L  Ankle Pumps: x 20 B     Plan   Plan  Times per week: BID x7  Times per day: Twice a day  Current Treatment Recommendations: Strengthening, Gait Training, ROM, Stair training, Balance Training, Functional Mobility Training, Endurance Training, Transfer Training  Safety Devices  Type of devices:  All fall risk precautions in place, Bed alarm in place, Left in bed, Call light within reach, Nurse notified, Gait belt, Patient at risk for falls      AM-PAC Score  AM-PAC Inpatient Mobility Raw Score : 20 (01/30/20 1302)  AM-PAC Inpatient T-Scale Score : 47.67 (01/30/20 1302)  Mobility Inpatient CMS 0-100% Score: 35.83 (01/30/20 1302)  Mobility Inpatient CMS G-Code Modifier : [place] (01/30/20 1302)          Goals  Short term goals  Time Frame for Short term goals: 2/1/20 unless noted  Short term goal 1: Pt will perform bed mobility with mod I by 1/31/20  Short term goal 2: Pt will perform transfers with SBA  Short term goal 3: Pt will ambulate 100 ft with RW and SBA  Short term goal 4: Pt will negotiate 2 stairs with one handrail and CGA  Short term goal 5: Pt will tolerate 10-15 reps of LE exercise for strengthening and balance  Patient Goals   Patient goals : \"to go home\"       Therapy Time   Individual Concurrent Group Co-treatment   Time In 0827         Time Out 0920         Minutes 53         Timed Code Treatment Minutes: 43 Minutes    If pt is discharged prior

## 2020-01-31 VITALS
OXYGEN SATURATION: 98 % | HEART RATE: 79 BPM | RESPIRATION RATE: 18 BRPM | SYSTOLIC BLOOD PRESSURE: 136 MMHG | TEMPERATURE: 98.9 F | BODY MASS INDEX: 21.9 KG/M2 | HEIGHT: 62 IN | WEIGHT: 119 LBS | DIASTOLIC BLOOD PRESSURE: 68 MMHG

## 2020-01-31 LAB
ANION GAP SERPL CALCULATED.3IONS-SCNC: 8 MMOL/L (ref 3–16)
BASOPHILS ABSOLUTE: 0 K/UL (ref 0–0.2)
BASOPHILS RELATIVE PERCENT: 0.3 %
BUN BLDV-MCNC: 9 MG/DL (ref 7–20)
CALCIUM SERPL-MCNC: 8.5 MG/DL (ref 8.3–10.6)
CHLORIDE BLD-SCNC: 93 MMOL/L (ref 99–110)
CO2: 28 MMOL/L (ref 21–32)
CREAT SERPL-MCNC: <0.5 MG/DL (ref 0.6–1.2)
EOSINOPHILS ABSOLUTE: 0 K/UL (ref 0–0.6)
EOSINOPHILS RELATIVE PERCENT: 0.1 %
GFR AFRICAN AMERICAN: >60
GFR NON-AFRICAN AMERICAN: >60
GLUCOSE BLD-MCNC: 103 MG/DL (ref 70–99)
GLUCOSE BLD-MCNC: 112 MG/DL (ref 70–99)
GLUCOSE BLD-MCNC: 145 MG/DL (ref 70–99)
HCT VFR BLD CALC: 29.6 % (ref 36–48)
HEMOGLOBIN: 10.2 G/DL (ref 12–16)
LYMPHOCYTES ABSOLUTE: 1.2 K/UL (ref 1–5.1)
LYMPHOCYTES RELATIVE PERCENT: 15.4 %
MCH RBC QN AUTO: 34.4 PG (ref 26–34)
MCHC RBC AUTO-ENTMCNC: 34.5 G/DL (ref 31–36)
MCV RBC AUTO: 99.7 FL (ref 80–100)
MONOCYTES ABSOLUTE: 0.6 K/UL (ref 0–1.3)
MONOCYTES RELATIVE PERCENT: 7.7 %
NEUTROPHILS ABSOLUTE: 6.2 K/UL (ref 1.7–7.7)
NEUTROPHILS RELATIVE PERCENT: 76.5 %
PDW BLD-RTO: 12.3 % (ref 12.4–15.4)
PERFORMED ON: ABNORMAL
PERFORMED ON: ABNORMAL
PLATELET # BLD: 161 K/UL (ref 135–450)
PMV BLD AUTO: 7.3 FL (ref 5–10.5)
POTASSIUM SERPL-SCNC: 3.6 MMOL/L (ref 3.5–5.1)
RBC # BLD: 2.97 M/UL (ref 4–5.2)
SODIUM BLD-SCNC: 129 MMOL/L (ref 136–145)
WBC # BLD: 8.1 K/UL (ref 4–11)

## 2020-01-31 PROCEDURE — 6370000000 HC RX 637 (ALT 250 FOR IP): Performed by: NURSE PRACTITIONER

## 2020-01-31 PROCEDURE — 85025 COMPLETE CBC W/AUTO DIFF WBC: CPT

## 2020-01-31 PROCEDURE — 36415 COLL VENOUS BLD VENIPUNCTURE: CPT

## 2020-01-31 PROCEDURE — 6370000000 HC RX 637 (ALT 250 FOR IP): Performed by: PHYSICIAN ASSISTANT

## 2020-01-31 PROCEDURE — 97116 GAIT TRAINING THERAPY: CPT

## 2020-01-31 PROCEDURE — 97110 THERAPEUTIC EXERCISES: CPT

## 2020-01-31 PROCEDURE — 80048 BASIC METABOLIC PNL TOTAL CA: CPT

## 2020-01-31 PROCEDURE — 2580000003 HC RX 258: Performed by: NURSE PRACTITIONER

## 2020-01-31 RX ORDER — 0.9 % SODIUM CHLORIDE 0.9 %
500 INTRAVENOUS SOLUTION INTRAVENOUS ONCE
Status: COMPLETED | OUTPATIENT
Start: 2020-01-31 | End: 2020-01-31

## 2020-01-31 RX ADMIN — CARBIDOPA AND LEVODOPA 1 TABLET: 25; 100 TABLET ORAL at 08:28

## 2020-01-31 RX ADMIN — SODIUM CHLORIDE 500 ML: 9 INJECTION, SOLUTION INTRAVENOUS at 13:58

## 2020-01-31 RX ADMIN — DOCUSATE SODIUM 100 MG: 100 CAPSULE, LIQUID FILLED ORAL at 08:28

## 2020-01-31 RX ADMIN — SENNOSIDES AND DOCUSATE SODIUM 1 TABLET: 8.6; 5 TABLET ORAL at 08:28

## 2020-01-31 RX ADMIN — ACETAMINOPHEN 650 MG: 325 TABLET ORAL at 13:56

## 2020-01-31 RX ADMIN — APIXABAN 2.5 MG: 2.5 TABLET, FILM COATED ORAL at 08:28

## 2020-01-31 RX ADMIN — INSULIN LISPRO 1 UNITS: 100 INJECTION, SOLUTION INTRAVENOUS; SUBCUTANEOUS at 08:34

## 2020-01-31 RX ADMIN — Medication 500 MG: at 08:27

## 2020-01-31 RX ADMIN — METOPROLOL TARTRATE 25 MG: 25 TABLET ORAL at 08:28

## 2020-01-31 RX ADMIN — CARBIDOPA AND LEVODOPA 1 TABLET: 25; 100 TABLET ORAL at 13:56

## 2020-01-31 RX ADMIN — HYDROCODONE BITARTRATE AND ACETAMINOPHEN 1 TABLET: 5; 325 TABLET ORAL at 02:38

## 2020-01-31 RX ADMIN — LOSARTAN POTASSIUM 50 MG: 25 TABLET, FILM COATED ORAL at 08:28

## 2020-01-31 RX ADMIN — ISOSORBIDE MONONITRATE 30 MG: 30 TABLET, EXTENDED RELEASE ORAL at 08:28

## 2020-01-31 RX ADMIN — HYDROCODONE BITARTRATE AND ACETAMINOPHEN 1 TABLET: 5; 325 TABLET ORAL at 12:03

## 2020-01-31 RX ADMIN — ACETAMINOPHEN 650 MG: 325 TABLET ORAL at 07:17

## 2020-01-31 ASSESSMENT — PAIN DESCRIPTION - PROGRESSION
CLINICAL_PROGRESSION: NOT CHANGED
CLINICAL_PROGRESSION: NOT CHANGED

## 2020-01-31 ASSESSMENT — PAIN - FUNCTIONAL ASSESSMENT
PAIN_FUNCTIONAL_ASSESSMENT: PREVENTS OR INTERFERES SOME ACTIVE ACTIVITIES AND ADLS
PAIN_FUNCTIONAL_ASSESSMENT: PREVENTS OR INTERFERES SOME ACTIVE ACTIVITIES AND ADLS

## 2020-01-31 ASSESSMENT — PAIN SCALES - GENERAL
PAINLEVEL_OUTOF10: 5
PAINLEVEL_OUTOF10: 6
PAINLEVEL_OUTOF10: 0
PAINLEVEL_OUTOF10: 8
PAINLEVEL_OUTOF10: 7
PAINLEVEL_OUTOF10: 6

## 2020-01-31 ASSESSMENT — PAIN DESCRIPTION - PAIN TYPE
TYPE: SURGICAL PAIN
TYPE: SURGICAL PAIN

## 2020-01-31 ASSESSMENT — PAIN DESCRIPTION - DESCRIPTORS
DESCRIPTORS: DISCOMFORT
DESCRIPTORS: DISCOMFORT

## 2020-01-31 ASSESSMENT — PAIN DESCRIPTION - LOCATION
LOCATION: HIP
LOCATION: HIP

## 2020-01-31 ASSESSMENT — PAIN DESCRIPTION - ONSET: ONSET: ON-GOING

## 2020-01-31 ASSESSMENT — PAIN DESCRIPTION - FREQUENCY: FREQUENCY: INTERMITTENT

## 2020-01-31 ASSESSMENT — PAIN DESCRIPTION - ORIENTATION
ORIENTATION: LEFT
ORIENTATION: LEFT

## 2020-01-31 NOTE — PROGRESS NOTES
Physical Therapy  Facility/Department: [facility] C5 - MED SURG/ORTHO  Daily Treatment Note  NAME: Fuad Hayward  : 1945  MRN: [id number]    Date of Service: 2020    Discharge Recommendations:  24 hour supervision or assist, Home with Home health PT   PT Equipment Recommendations  Equipment Needed: No    Assessment   Body structures, Functions, Activity limitations: Decreased functional mobility ; Decreased endurance;Decreased ROM; Decreased strength;Decreased balance  Assessment: Pt is 77 yo female s/p anterolateral THR LLE with WBAT and positioning precautions. Pt required Supervision to SBA for all mobility. pt and family voice understanding of hip precaution, WBAT and oral instruction in stair negotiation. AM PAC score of 20/24. Pt will benefit from skilled PT to address post op deficits. Recommend Home with 24 hr assist and home PT  Treatment Diagnosis: decreased indep with mobility  Specific instructions for Next Treatment: per protocol  Prognosis: Good  PT Education: Goals;Transfer Training;Equipment;PT Role;Functional Mobility Training;Plan of Care;Weight-bearing Education;Home Exercise Program;Precautions;Gait Training;General Safety  Patient Education: pt and family verbalized understanding  Barriers to Learning: none  REQUIRES PT FOLLOW UP: Yes  Activity Tolerance  Activity Tolerance: Patient Tolerated treatment well    Patient Diagnosis(es): The encounter diagnosis was Status post total replacement of left hip.     has a past medical history of Allergic rhinitis, Arthritis, CAD (coronary artery disease), Fractures, Hyperlipidemia, Hypertension, Migraine headache, Parkinson disease ([address] Reactive airway disease, Shingles, and Sjogren's syndrome ([facility] [address] has a past surgical history that includes Colonoscopy (09/15/10); Tubal ligation (); Hip fracture surgery (Left); sinus surgery (2015);  Coronary angioplasty (2016); dupuytrens contracture surgery (Left, 2019); EXCISION LESION HAND / FINGER (Left, 6/17/2019); hip surgery; Breast surgery (Left, 1970); joint replacement (Left, 04/2018); Dental surgery (12/4/20); arthrography (Left, 1/8/2020); and Revision total hip arthroplasty (Left, 1/29/2020). Restrictions  Restrictions/Precautions: ROM Restrictions, General Precautions  Required Braces or Orthoses?: No  Lower Extremity Weight Bearing Restrictions  Left Lower Extremity Weight Bearing: Weight Bearing As Tolerated  Hip Precautions: No active ABduction, No hip extension, No hip external rotation, No ADduction  Other position/activity restrictions: anterolateral approach THR  Subjective   Chart Reviewed: Yes  Response To Previous Treatment: Patient with no complaints from previous session. Family / Caregiver Present: Yes(spouse and daughter arrived at end of session)  Referring Practitioner: Alex Negrete MD  Subjective: Pt agreeable to PT, pt and family voice understanding of hip positioning precautions  Comments: Pt resting in bed upon entry, RN cleared pt for therapy  Patient Currently in Pain: Yes  Pain Assessment  Pain Assessment: 0-10  Pain Level: 7  Patient's Stated Pain Goal: 2  Pain Type: Surgical pain  Pain Location: Hip  Pain Orientation: Left  Pain Descriptors: Discomfort  Pain Frequency: Intermittent(with ambulation, no pain at rest)  Pain Onset: On-going  Clinical Progression: Not changed  Functional Pain Assessment: Prevents or interferes some active activities and ADLs  Non-Pharmaceutical Pain Intervention(s): Emotional support;Rest;Repositioned       Orientation  Overall Orientation Status: Within Normal Limits  Objective   Bed mobility  Supine to Sit: Supervision  Transfers  Sit to Stand: Stand by assistance  Stand to sit: Stand by assistance  Ambulation  WB Status: FWBAT  Surface: level tile  Device: Rolling Walker  Assistance: Stand by assistance;Contact guard assistance  Quality of Gait: Step to pattern to avoid L hip extension.   Distance: 150 ft      Exercises  Quad Sets: x 15 B  Heelslides: x 15 L  Gluteal Sets: x 15 B  Knee Short Arc Quad: x 15 L  Ankle Pumps: x 20 B       Comment: Reviewed hip precautions and gait pattern with pt and spouse and daughter               AM-PAC Score  AM-PAC Inpatient Mobility Raw Score : 20 (01/31/20 1001)  AM-PAC Inpatient T-Scale Score : 47.67 (01/31/20 1001)  Mobility Inpatient CMS 0-100% Score: 35.83 (01/31/20 1001)  Mobility Inpatient CMS G-Code Modifier : CJ (01/31/20 1001)     Goals  Short term goals  Time Frame for Short term goals: 2/1/20 unless noted  Short term goal 1: Pt will perform bed mobility with mod I by 1/31/20- supervision 1/31  Short term goal 2: Pt will perform transfers with SBA; goal met 1/30/20  Short term goal 3: Pt will ambulate 100 ft with RW and SBA- met 1/31  ft SBA  Short term goal 4: Pt will negotiate 2 stairs with one handrail and CGA  Short term goal 5: Pt will tolerate 10-15 reps of LE exercise for strengthening and balance- MET 15-20 reps 1/31  Patient Goals   Patient goals : \"to go home\"    Plan    Times per week: BID x7  Times per day: Twice a day  Specific instructions for Next Treatment: per protocol  Current Treatment Recommendations: Strengthening, Gait Training, ROM, Stair training, Balance Training, Functional Mobility Training, Endurance Training, Transfer Training  Safety Devices  Type of devices: All fall risk precautions in place, Call light within reach, Nurse notified, Gait belt, Patient at risk for falls, Left in chair, Chair alarm in place     Therapy Time   Individual Concurrent Group Co-treatment   Time In 0920         Time Out 0945         Minutes 25               If pt discharges prior to next PT session this note will serve as discharge summary.   Christian Humphries, PT

## 2020-01-31 NOTE — PROGRESS NOTES
Met with patient. Na 129 noted. Suspect related to poor PO intake and N/V over past two days. Will give 500 cc saline bolus prior to dc. Encouraged PO intake. Ordered outpatient labs for Monday, 2/3 at PCP office. Discussed with patient, family at bedside, and ortho. Artemio Alas for [employer] home.

## 2020-01-31 NOTE — PROGRESS NOTES
Physical Therapy  Facility/Department: [facility] C5 - MED SURG/ORTHO  Daily Treatment Note  NAME: Jeffrey Patton  : 1945  MRN: [id number]    Date of Service: 2020    Discharge Recommendations:  24 hour supervision or assist, Home with Home health PT   PT Equipment Recommendations  Equipment Needed: No    Assessment   Body structures, Functions, Activity limitations: Decreased functional mobility ; Decreased endurance;Decreased ROM; Decreased strength;Decreased balance  Assessment: Pt is 75 yo female s/p anterolateral THR LLE with WBAT and positioning precautions. Pt required Supervision to SBA for all mobility. pt and family voice understanding of hip precaution, WBAT and instruction in stair negotiation. AM PAC score of 20/24. Pt will benefit from skilled PT to address post op deficits. Recommend Home with 24 hr assist and home PT  Treatment Diagnosis: decreased indep with mobility  Specific instructions for Next Treatment: per protocol  Prognosis: Good  PT Education: Goals;Transfer Training;Equipment;PT Role;Functional Mobility Training;Plan of Care;Weight-bearing Education;Home Exercise Program;Precautions;Gait Training;General Safety  Patient Education: pt and family verbalized understanding  Barriers to Learning: none  REQUIRES PT FOLLOW UP: Yes  Activity Tolerance  Activity Tolerance: Patient Tolerated treatment well  Activity Tolerance:       Patient Diagnosis(es): The encounter diagnosis was Status post total replacement of left hip.     has a past medical history of Allergic rhinitis, Arthritis, CAD (coronary artery disease), Fractures, Hyperlipidemia, Hypertension, Migraine headache, Parkinson disease ([address] Reactive airway disease, Shingles, and Sjogren's syndrome ([facility] [address] has a past surgical history that includes Colonoscopy (09/15/10); Tubal ligation (); Hip fracture surgery (Left); sinus surgery (2015);  Coronary angioplasty (2016); dupuytrens contracture surgery (Left,

## 2020-01-31 NOTE — DISCHARGE SUMMARY
Department of Orthopedic Surgery  Nurse Practitioner   Discharge Summary      Wing Negron is a 76 y.o. female underwent total hip replacement, revision of conversion from teetee to total, procedure without complication. Gianna Negron was admitted to the floor following their recovery in the PACU.      Discharge Diagnosis  left Hip Replacement    Current Inpatient Medications    Current Facility-Administered Medications: 0.9 % sodium chloride bolus, 500 mL, Intravenous, Once  metoprolol tartrate (LOPRESSOR) tablet 25 mg, 25 mg, Oral, BID  isosorbide mononitrate (IMDUR) extended release tablet 30 mg, 30 mg, Oral, Daily  prochlorperazine (COMPAZINE) injection 10 mg, 10 mg, Intravenous, Q6H PRN  amLODIPine (NORVASC) tablet 5 mg, 5 mg, Oral, Daily  butalbital-acetaminophen-caffeine (FIORICET, ESGIC) per tablet 1 tablet, 1 tablet, Oral, Q4H PRN  oyster shell calcium/vitamin D 250-125 MG-UNIT per tablet 500 mg, 500 mg, Oral, Daily  carbidopa-levodopa (SINEMET)  MG per tablet 1 tablet, 1 tablet, Oral, 4x Daily  carboxymethylcellulose PF (THERATEARS) 1 % ophthalmic gel 1 drop, 1 drop, Both Eyes, 4x Daily  losartan (COZAAR) tablet 50 mg, 50 mg, Oral, Daily  simvastatin (ZOCOR) tablet 5 mg, 5 mg, Oral, Nightly  fluticasone (FLONASE) 50 MCG/ACT nasal spray 1 spray, 1 spray, Each Nostril, Daily  montelukast (SINGULAIR) tablet 10 mg, 10 mg, Oral, Nightly  nitroGLYCERIN (NITROSTAT) SL tablet 0.4 mg, 0.4 mg, Sublingual, Q5 Min PRN  polyvinyl alcohol (LIQUIFILM TEARS) 1.4 % ophthalmic solution 2 drop, 2 drop, Both Eyes, PRN  lactated ringers infusion, , Intravenous, Continuous  sodium chloride flush 0.9 % injection 10 mL, 10 mL, Intravenous, 2 times per day  sodium chloride flush 0.9 % injection 10 mL, 10 mL, Intravenous, PRN  acetaminophen (TYLENOL) tablet 650 mg, 650 mg, Oral, Q6H  docusate sodium (COLACE) capsule 100 mg, 100 mg, Oral, BID  sennosides-docusate sodium (SENOKOT-S) 8.6-50 MG tablet 1 tablet, with their current diet restrictions and will continue to follow the hip precautions outlined to them by us and PT/OT. Condition on Discharge: Stable    Plan  Return visit in 2 weeks. .  Patient was instructed on the use of pain medications, the signs and symptoms of infection, and was given our number to call should they have any questions or concerns following discharge. For opioid prescriptions given at discharge the following statement is provided for compliance with [employer] rules. Patient being given increased dosage/quantity of opoid pain medication in excess of [employer] guidelines which noted a 30 MED daily of opioids due to the fact that he/she has undergone major orthopaedic surgery as outlined in rule 4731-11-13. Dosages and further duration of the pain medication will be re-evaluated at her post op visit in 2 weeks. Patient was educated on dosing expectations and limits of prescribing as a result of the new [facility] Board rules enacted August 31, 2017. Please also note that this is not the initial opoid prescription issued to this patient but a continuation of medication utilized during the hospital admission as noted in the medical record. [place]ARRS report has also been utilized to screen for any abuse history or suspicious activity as outlined in [place]. All efforts have been taken to prevent abuse potential and misuse of opioid medications including education, screening, and close clinical follow up.

## 2020-01-31 NOTE — DISCHARGE INSTR - COC
{CHP DME CVCF:051436381}  Bathing  {CHP DME QREF:981226360}  Dressing  {CHP DME PQSW:507310872}  Toileting  {CHP DME LHTX:103989061}  Feeding  {CHP DME QQWK:780154515}  Med Admin  {CHP DME GLBR:029871714}  Med Delivery   { DEANA MED Delivery:388773257}    Wound Care Documentation and Therapy:        Elimination:  Continence:   · Bowel: {YES / XS:36581}  · Bladder: {YES / FM:13562}  Urinary Catheter: {Urinary Catheter:239107598}   Colostomy/Ileostomy/Ileal Conduit: {YES / IM:49414}       Date of Last BM: ***  No intake or output data in the 24 hours ending 20 1209  No intake/output data recorded.     Safety Concerns:     [employer] Safety Concerns:057228573}    Impairments/Disabilities:      [employer] Impairments/Disabilities:554024755}    Nutrition Therapy:  Current Nutrition Therapy:   [employer] Diet List:627908124}    Routes of Feeding: {CHP DME Other Feedings:417242332}  Liquids: {Slp liquid thickness:49246}  Daily Fluid Restriction: {CHP DME Yes amt example:097696515}  Last Modified Barium Swallow with Video (Video Swallowing Test): {Done Not Done HQLL:204559025}    Treatments at the Time of Hospital Discharge:   Respiratory Treatments: ***  Oxygen Therapy:  {Therapy; copd oxygen:07170}  Ventilator:    { CC Vent KRQT:158932013}    Rehab Therapies: {THERAPEUTIC INTERVENTION:1599557480}  Weight Bearing Status/Restrictions: [employer] Weight Bearin}  Other Medical Equipment (for information only, NOT a DME order):  {EQUIPMENT:601408830}  Other Treatments: ***    Patient's personal belongings (please select all that are sent with patient):  {CHP DME Belongings:196756041}    RN SIGNATURE:  {Esignature:572760986}    CASE MANAGEMENT/SOCIAL WORK SECTION    Inpatient Status Date: ***    Readmission Risk Assessment Score:  Readmission Risk              Risk of Unplanned Readmission:        11           Discharging to Facility/ Agency   · Name:   · Address:  · Phone:  · Fax:    Dialysis Facility (if applicable) · Name:  · Address:  · Dialysis Schedule:  · Phone:  · Fax:    / signature: {Esignature:564081311}    PHYSICIAN SECTION    Prognosis: {Prognosis:1651277969}    Condition at Discharge: [place] Patient Condition:081440925}    Rehab Potential (if transferring to Rehab): {Prognosis:0426192164}    Recommended Labs or Other Treatments After Discharge:     Physician Certification: I certify the above information and transfer of Caron Trejo  is necessary for the continuing treatment of the diagnosis listed and that she requires {Admit to Appropriate Level of Care:36887} for {GREATER/LESS:905178781} 30 days.      Update Admission H&P: {CHP DME Changes in KDNOP:202843447}    PHYSICIAN SIGNATURE:  {Esignature:087716511}

## 2020-02-03 ENCOUNTER — NURSE ONLY (OUTPATIENT)
Dept: FAMILY MEDICINE CLINIC | Age: 75
End: 2020-02-03

## 2020-02-03 ENCOUNTER — TELEPHONE (OUTPATIENT)
Dept: ORTHOPEDIC SURGERY | Age: 75
End: 2020-02-03

## 2020-02-03 ENCOUNTER — TELEPHONE (OUTPATIENT)
Dept: FAMILY MEDICINE CLINIC | Age: 75
End: 2020-02-03

## 2020-02-03 NOTE — TELEPHONE ENCOUNTER
[facility] Transitions Initial Follow Up Call    Outreach made within 2 business days of discharge: Yes    Patient: Caryle Fordyce Patient : 1945   MRN: [id number]  Reason for Admission: There are no discharge diagnoses documented for the most recent discharge. Discharge Date: 20       Spoke with: Gricelda Chan    Discharge department/facility: [facility] Interactive Patient Contact:  Was patient able to fill all prescriptions: Yes  Was patient instructed to bring all medications to the follow-up visit: Yes  Is patient taking all medications as directed in the discharge summary?  Yes  Does patient understand their discharge instructions: Yes  Does patient have questions or concerns that need addressed prior to 7-14 day follow up office visit: no    Scheduled appointment with PCP within 7-14 days    Follow Up  Future Appointments   Date Time Provider Ella Clement   2020 11:10 AM MARK Barnett AND Y[facility]   3/11/2020  8:30 AM Kaylene Moody PT [facility] AND PT Lamin Burrell, [place]

## 2020-02-04 LAB
ANION GAP SERPL CALCULATED.3IONS-SCNC: 12 MMOL/L (ref 3–16)
BUN BLDV-MCNC: 10 MG/DL (ref 7–20)
CALCIUM SERPL-MCNC: 8.8 MG/DL (ref 8.3–10.6)
CHLORIDE BLD-SCNC: 100 MMOL/L (ref 99–110)
CO2: 27 MMOL/L (ref 21–32)
CREAT SERPL-MCNC: 0.5 MG/DL (ref 0.6–1.2)
GFR AFRICAN AMERICAN: >60
GFR NON-AFRICAN AMERICAN: >60
GLUCOSE BLD-MCNC: 101 MG/DL (ref 70–99)
POTASSIUM SERPL-SCNC: 4.1 MMOL/L (ref 3.5–5.1)
SODIUM BLD-SCNC: 139 MMOL/L (ref 136–145)

## 2020-02-11 ENCOUNTER — OFFICE VISIT (OUTPATIENT)
Dept: ORTHOPEDIC SURGERY | Age: 75
End: 2020-02-11

## 2020-02-11 PROCEDURE — 99024 POSTOP FOLLOW-UP VISIT: CPT | Performed by: PHYSICIAN ASSISTANT

## 2020-02-11 RX ORDER — HYDROCODONE BITARTRATE AND ACETAMINOPHEN 5; 325 MG/1; MG/1
1 TABLET ORAL EVERY 6 HOURS PRN
Qty: 28 TABLET | Refills: 0 | Status: SHIPPED | OUTPATIENT
Start: 2020-02-11 | End: 2020-02-18

## 2020-02-11 RX ORDER — ASPIRIN 325 MG
325 TABLET, DELAYED RELEASE (ENTERIC COATED) ORAL 2 TIMES DAILY
Qty: 60 TABLET | Refills: 0 | Status: CANCELLED | OUTPATIENT
Start: 2020-02-11 | End: 2020-03-12

## 2020-02-11 RX ORDER — MELOXICAM 15 MG/1
15 TABLET ORAL DAILY PRN
Qty: 90 TABLET | Refills: 1 | Status: SHIPPED | OUTPATIENT
Start: 2020-02-11 | End: 2022-02-21

## 2020-02-21 ENCOUNTER — TELEPHONE (OUTPATIENT)
Dept: ORTHOPEDIC SURGERY | Age: 75
End: 2020-02-21

## 2020-02-21 NOTE — ANESTHESIA PRE PROCEDURE
Department of Anesthesiology  Preprocedure Note       Name:  Winston Shone   Age:  76 y.o.  :  1945                                          MRN:  [id number]         Date:  2020      Surgeon:  Marilee Knutson MD    Procedure: LEFT HIP ASPIRATION WITH SYNOVASURE     HPI:  Winston Shone is a 76 y.o. female who has an elevated sed rate with normal WBC and sed rate. She does suffer from Sjogren's syndrome. Her bone scan does not demonstrate any evidence of loosening or infection. Patient did have a left lateral hip hemiarthroplasty done by Dr. Cinthya London 2018. She did well for approximately 6 months and then had a gradual increase in pain symptoms without new injury or trauma. Pain is concentrated over her groin, lateral hip, and IT band. Increased pain with activities and improvement with rest.The patients chronic pain has gradually worsening over the last 1 year. The patient rates pain on a level of 6/10. Pain impacts patients ability to drive, sleep and climb stairs. Medications prior to admission:    carbidopa-levodopa (SINEMET)  MG per tablet TAKE 1 TABLET BY MOUTH 4 TIMES DAILY   amLODIPine (NORVASC) 5 MG tablet TAKE 1 TABLET BY MOUTH ONCE DAILY; Patient taking differently: nightly    montelukast (SINGULAIR) 10 MG tablet TAKE 1 TABLET BY MOUTH NIGHTLY   isosorbide mononitrate (IMDUR) 30 MG extended release tablet TAKE 1 TABLET BY MOUTH ONCE DAILY (NEED OFFICE VISIT)   lovastatin (MEVACOR) 20 MG tablet Take 1 tablet by mouth nightly   losartan (COZAAR) 50 MG tablet Take 1 tablet by mouth daily   metoprolol tartrate (LOPRESSOR) 25 MG tablet TAKE ONE TABLET BY MOUTH TWICE A DAY   Calcium Carbonate-Vitamin D (OYSTER SHELL CALCIUM/D) 500-200 MG-UNIT TABS TAKE 1 TABLET BY MOUTH ONCE DAILY   Multiple Vitamins-Minerals (THERAPEUTIC MULTIVITAMIN-MINERALS) tablet Take 1 tablet by mouth daily   polyvinyl alcohol-povidone (HYPOTEARS) 1.4-0.6 % ophthalmic solution 1-2 drops as needed. 30yo  at 37w5d EDC 3/8/20 by LMP c/w 1st tri sono presenting to L&D for prolonged monitoring and BPP. Patient had polyhydramnios on sono on , MVP 8.86cm, also found to have 3min decel, BPP 8/10. Was monitored on L&D that day and later sent home due to due to reassuring fetal and maternal well being, BPP 10/10. Reports occasional contractions, denies VB or LOF. Good fetal movements. 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 Denies  active lesions or prodromal symptoms. H/o c/s with VBACx3, with plan for . No other complications with this pregnancy. GBS pos. 32yo  at 37w5d EDC 3/8/20 by LMP c/w 1st tri sono presenting to L&D for NST and BPP. Patient had polyhydramnios on sono on , MVP 8.86cm, also found to have 3min decel, BPP 8/10. Was monitored on L&D that day and later sent home due to due to reassuring fetal and maternal well being, BPP 10/10. Reports occasional contractions, denies VB or LOF. Good fetal movements. 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 Denies  active lesions or prodromal symptoms. H/o c/s with VBACx3, with plan for . No other complications with this pregnancy. GBS pos.

## 2020-03-10 ENCOUNTER — OFFICE VISIT (OUTPATIENT)
Dept: ORTHOPEDIC SURGERY | Age: 75
End: 2020-03-10

## 2020-03-10 PROCEDURE — 99024 POSTOP FOLLOW-UP VISIT: CPT | Performed by: PHYSICIAN ASSISTANT

## 2020-03-10 RX ORDER — HYDROCODONE BITARTRATE AND ACETAMINOPHEN 5; 325 MG/1; MG/1
1 TABLET ORAL EVERY 6 HOURS PRN
Qty: 28 TABLET | Refills: 0 | Status: SHIPPED | OUTPATIENT
Start: 2020-03-10 | End: 2020-04-02 | Stop reason: SDUPTHER

## 2020-03-10 RX ORDER — HYDROCODONE BITARTRATE AND ACETAMINOPHEN 5; 325 MG/1; MG/1
1 TABLET ORAL EVERY 6 HOURS PRN
Qty: 28 TABLET | Refills: 0 | Status: SHIPPED | OUTPATIENT
Start: 2020-03-10 | End: 2020-03-10 | Stop reason: SDUPTHER

## 2020-03-10 RX ORDER — CLINDAMYCIN HYDROCHLORIDE 300 MG/1
CAPSULE ORAL
Qty: 12 CAPSULE | Refills: 0 | Status: SHIPPED | OUTPATIENT
Start: 2020-03-10 | End: 2020-06-24

## 2020-03-10 NOTE — PROGRESS NOTES
History of present illness: The patient returns today after revision left total hip replacement. Our surgical plan was to convert a left hip hemiarthroplasty to total hip arthroplasty. At the time of surgery the femoral component was actually loose. Pain control as been satisfactory with oral medications. There have been no fevers or chills. Patient is 6 weeks postop. Pain Assessment  Location of Pain: Pelvis  Location Modifiers: Left  Severity of Pain: 4  Frequency of Pain: Intermittent  Aggravating Factors: Standing, Walking  Limiting Behavior: Some  Result of Injury: No  Work-Related Injury: No  Are there other pain locations you wish to document?: No]    Physical examination: The incision is clean, dry, and intact with no drainage or signs of infection. There is expected swelling with no evidence of DVT and a negative Homans sign. Neurovascular exam is intact. Leg length is appropriate. Assessment/plan: We would like to begin physical therapy to restore range of motion and strength. The patient was given local wound care instructions. We did refilled their pain medication today. He will followup in 3-4 weeks for reevaluation. Patient should continue on her walker until she can regain strength and range of motion in physical therapy. At that point she can gradually convert to a cane.

## 2020-03-12 ENCOUNTER — HOSPITAL ENCOUNTER (OUTPATIENT)
Dept: PHYSICAL THERAPY | Age: 75
Setting detail: THERAPIES SERIES
Discharge: HOME | End: 2020-03-12
Payer: MEDICARE

## 2020-03-12 PROCEDURE — 97110 THERAPEUTIC EXERCISES: CPT

## 2020-03-12 PROCEDURE — 97140 MANUAL THERAPY 1/> REGIONS: CPT

## 2020-03-12 PROCEDURE — 97161 PT EVAL LOW COMPLEX 20 MIN: CPT

## 2020-03-12 NOTE — FLOWSHEET NOTE
[facility],  [address] [place]  Phone: [phone]  Fax [phone]    Physical Therapy Treatment Note/ Progress Report:           Date:  3/12/2020    Patient Name:  Caron Trejo    :  1945  MRN: [id number]  Restrictions/Precautions:    Medical/Treatment Diagnosis Information:  · Diagnosis: B42.944 (ICD-10-CM) - Status post total replacement of left hip DOS 20  · Treatment Diagnosis: M25.552 Pain in left hip; M25.652 Stiffness in left hip; R26.2 Difficulty in walking   Insurance/Certification information:  PT Insurance Information: 3/11/2020 BCBS MC - $40CP - $0DED - PT/OT MED NEC - 100% - NEEDS AIM AUTH 2020  Physician Information:  Referring Practitioner: Bora Calix PA-C  Has the plan of care been signed (Y/N):        []  Yes  [x]  No     Date of Patient follow up with Physician: 20      Is this a Progress Report:     []  Yes  [x]  No        If Yes:  Date Range for reporting period:  Beginning 3/12/20  Ending    Progress report will be due (10 Rx or 30 days whichever is less):   Recertification will be due (POC Duration  / 90 days whichever is less):     Visit # Insurance Allowable Requires auth   1 BMN    []no        [x]yes: beginning      Functional Scale: LEFS 71% disability   Date assessed:  3/12      Therapy Diagnosis/Practice Pattern:H    Number of Comorbidities:  []0     [x]1-2    []3+    Latex Allergy:  [x]NO      []YES  Preferred Language for Healthcare:   [x]English       []other:      Pain level: 0.5-5 /10     SUBJECTIVE:  See eval    OBJECTIVE: See eval   Observation:    Test measurements:      RESTRICTIONS/PRECAUTIONS: L lateral THR 20 (6 weeks PO), HTN, Parkinson's disease    Exercises/Interventions:     Therapeutic Ex (88610) Sets/sec Notes/CUES HEP   Pt ed: findings of exam and POC; needs to use her walker for safety and also for pelvic stabilization; lateral hip prec's; time-frame for recovery; importance of PT- would recommend PT 2x/week, but d/t cost and dislike of PT in general, she could come 1x/week with more emphasis on HEP. Scar mobilization 10'     Jamal stretch  30\"x3  X   HS stretch supine with strap  HS stretch long sit EOB 30\"  2x30:  X   gastroc stretch c strap supine 30\"x3  X   bridge 5\"x10  X   Hip abd- R,L iso 10x ea Red TB X   Add iso supine HL 5\"x10  X   SAQ 2x10  X                           Manual Intervention (72867)      STM quad tendon, quadriceps, distal IT band, TFL, scar mobilization 15'     NV hip PROM                              NMR re-education (72783)   CUES NEEDED                                                         Therapeutic Activity (23774)                                                Therapeutic Exercise and NMR EXR  [x] (29888) Provided verbal/tactile cueing for activities related to strengthening, flexibility, endurance, ROM for improvements in LE, proximal hip, and core control with self care, mobility, lifting, ambulation.  [] (65722) Provided verbal/tactile cueing for activities related to improving balance, coordination, kinesthetic sense, posture, motor skill, proprioception  to assist with LE, proximal hip, and core control in self care, mobility, lifting, ambulation and eccentric single leg control.      NMR and Therapeutic Activities:    [] (09487 or 36480) Provided verbal/tactile cueing for activities related to improving balance, coordination, kinesthetic sense, posture, motor skill, proprioception and motor activation to allow for proper function of core, proximal hip and LE with self care and ADLs  [] (64593) Gait Re-education- Provided training and instruction to the patient for proper LE, core and proximal hip recruitment and positioning and eccentric body weight control with ambulation re-education including up and down stairs     Home Exercise Program:    [x] (06532) Reviewed/Progressed HEP activities related to strengthening, flexibility, endurance, ROM of core, proximal hip and LE for functional self-care, mobility, lifting and ambulation/stair navigation   [] (44267)Reviewed/Progressed HEP activities related to improving balance, coordination, kinesthetic sense, posture, motor skill, proprioception of core, proximal hip and LE for self care, mobility, lifting, and ambulation/stair navigation      Manual Treatments:  PROM / STM / Oscillations-Mobs:  G-I, II, III, IV (PA's, Inf., Post.)  [x] (44157) Provided manual therapy to mobilize LE, proximal hip and/or LS spine soft tissue/joints for the purpose of modulating pain, promoting relaxation,  increasing ROM, reducing/eliminating soft tissue swelling/inflammation/restriction, improving soft tissue extensibility and allowing for proper ROM for normal function with self care, mobility, lifting and ambulation. Modalities:     [] GAME READY (VASO)- for significant edema, swelling, pain control. Declined ice  Charges:  Timed Code Treatment Minutes: 30   Total Treatment Minutes: 60     BWC time in/time out:   (and requires time in and out for each CPT code)    [x] EVAL (LOW) 19676 (typically 20 minutes face-to-face)  [] EVAL (MOD) 46596 (typically 30 minutes face-to-face)  [] EVAL (HIGH) 78132 (typically 45 minutes face-to-face)  [] RE-EVAL     [x] CQ(13644) x 1    [] IONTO  [] NMR (92128) x     [] VASO  [x] Manual (19322) x  1    [] Other:  [] TA x      [] Mech Traction (81712)  [] ES(attended) (14460)      [] ES (un) (37387):       GOALS:   Patient stated goal: Get back normal life. Get back to vacuuming, cooking, washing dishes. []? Progressing: []? Met: []? Not Met: []? Adjusted     Therapist goals for Patient:   Short Term Goals: To be achieved in: 2 weeks  1. Independent in HEP and progression per patient tolerance, in order to prevent re-injury. []? Progressing: []? Met: []? Not Met: []? Adjusted  2.  Patient will have a decrease in pain to facilitate improvement in movement, function, and ADLs as indicated by Functional Deficits. []? Progressing: []? Met: []? Not Met: []? Adjusted     Long Term Goals: To be achieved in: 10 weeks  1. Disability index score of 50% or less for the LEFS to assist with reaching prior level of function. []? Progressing: []? Met: []? Not Met: []? Adjusted  2. Patient will demonstrate increased AROM to 100 deg of hip flexion, 35 deg of hip abduction, IR, ER to 25 degrees to allow for proper joint functioning for bending forward and donning/doffing shoes and socks. []? Progressing: []? Met: []? Not Met: []? Adjusted  3. Patient will demonstrate an increase in Strength to at least 4+/5 for the left hip stabilizers and 5/5 for the left knee in order to return to [facility] ambulation s AD with good pelvic control; return to [facility] chores like cleaning and cooking. []? Progressing: []? Met: []? Not Met: []? Adjusted  4. Patient will return to community ambulation with a SPC and  ambulation s AD with good pelvic stability and with minimal pain. []? Progressing: []? Met: []? Not Met: []? Adjusted  5. Patient will be able to WB x 30' or better for cooking, cleaning, and community errands. []? Progressing: []? Met: []? Not Met: []? Adjusted         Progression Towards Functional goals:  [] Patient is progressing as expected towards functional goals listed. [] Progression is slowed due to complexities listed. [] Progression has been slowed due to co-morbidities. [x] Plan just implemented, too soon to assess goals progression  [] Other:     Overall Progression Towards Functional goals/ Treatment Progress Update:  [] Patient is progressing as expected towards functional goals listed. [] Progression is slowed due to complexities/Impairments listed. [] Progression has been slowed due to co-morbidities.   [x] Plan just implemented, too soon to assess goals progression <30days   [] Goals require adjustment due to lack of progress  [] Patient

## 2020-03-12 NOTE — PLAN OF CARE
education/learning barriers              []Environmental, home barriers              []profession/work barriers  [x]past PT/medical experience  []other:  Justification:     Falls Risk Assessment (30 days):   [x] Falls Risk assessed and no intervention required. [] Falls Risk assessed and Patient requires intervention due to being higher risk   TUG score (>12s at risk):     [] Falls education provided, including       G-Codes:   LEFS 71% disability     ASSESSMENT:   Functional Impairments:     []Noted lumbar/proximal hip/LE joint hypomobility   [x]Decreased LE functional ROM   [x]Decreased core/proximal hip strength and neuromuscular control   [x]Decreased LE functional strength   [x]Reduced balance/proprioceptive control   []other:      Functional Activity Limitations (from functional questionnaire and intake)   [x]Reduced ability to tolerate prolonged functional positions   [x]Reduced ability or difficulty with changes of positions or transfers between positions   [x]Reduced ability to maintain good posture and demonstrate good body mechanics with sitting, bending, and lifting   [x]Reduced ability to sleep   [x] Reduced ability or tolerance with driving and/or computer work   [x]Reduced ability to perform lifting, carrying tasks   [x]Reduced ability to squat   [x]Reduced ability to forward bend   [x]Reduced ability to ambulate prolonged functional periods/distances/surfaces   [x]Reduced ability to ascend/descend stairs   []Reduced ability to run, hop, cut or jump   []other:    Participation Restrictions   [x]Reduced participation in self care activities   [x]Reduced participation in home management activities   []Reduced participation in work activities   [x]Reduced participation in social activities. []Reduced participation in sport/recreation activities. Classification :    [x]Signs/symptoms consistent with post-surgical status including decreased ROM, strength and function.    []Signs/symptoms consistent with joint sprain/strain   []Signs/symptoms consistent with patella-femoral syndrome   []Signs/symptoms consistent with knee OA/hip OA   []Signs/symptoms consistent with internal derangement of knee/Hip   []Signs/symptoms consistent with functional hip weakness/NMR control      []Signs/symptoms consistent with tendinitis/tendinosis    []signs/symptoms consistent with pathology which may benefit from Dry needling      []other:      Prognosis/Rehab Potential:      []Excellent   [x]Good    []Fair   []Poor    Tolerance of evaluation/treatment:    []Excellent   [x]Good    []Fair   []Poor  Physical Therapy Evaluation Complexity Justification  [x] A history of present problem with:  [] no personal factors and/or comorbidities that impact the plan of care;  []1-2 personal factors and/or comorbidities that impact the plan of care  [x]3 personal factors and/or comorbidities that impact the plan of care  [x] An examination of body systems using standardized tests and measures addressing any of the following: body structures and functions (impairments), activity limitations, and/or participation restrictions;:  [] a total of 1-2 or more elements   [] a total of 3 or more elements   [x] a total of 4 or more elements   [x] A clinical presentation with:  [x] stable and/or uncomplicated characteristics   [] evolving clinical presentation with changing characteristics  [] unstable and unpredictable characteristics;   [x] Clinical decision making of [x] low, [] moderate, [] high complexity using standardized patient assessment instrument and/or measurable assessment of functional outcome.     [x] EVAL (LOW) 23827 (typically 20 minutes face-to-face)  [] EVAL (MOD) 94036 (typically 30 minutes face-to-face)  [] EVAL (HIGH) 31356 (typically 45 minutes face-to-face)  [] RE-EVAL       PLAN:   Frequency/Duration:  1-2 days per week for 10 Weeks:  Interventions:  [x]  Therapeutic exercise including: strength training, ROM, for Lower extremity and core   [x]  NMR activation and proprioception for LE, Glutes and Core   [x]  Manual therapy as indicated for LE, Hip and spine to include: Dry Needling/IASTM, STM, PROM, Gr I-IV mobilizations, manipulation. [x] Modalities as needed that may include: thermal agents, E-stim, Biofeedback, US, iontophoresis as indicated  [x] Patient education on joint protection, postural re-education, activity modification, progression of HEP. HEP instruction:(see scanned forms)    GOALS:  Patient stated goal: Get back normal life. Get back to vacuuming, cooking, washing dishes. [] Progressing: [] Met: [] Not Met: [] Adjusted    Therapist goals for Patient:   Short Term Goals: To be achieved in: 2 weeks  1. Independent in HEP and progression per patient tolerance, in order to prevent re-injury. [] Progressing: [] Met: [] Not Met: [] Adjusted  2. Patient will have a decrease in pain to facilitate improvement in movement, function, and ADLs as indicated by Functional Deficits. [] Progressing: [] Met: [] Not Met: [] Adjusted    Long Term Goals: To be achieved in: 10 weeks  1. Disability index score of 50% or less for the LEFS to assist with reaching prior level of function. [] Progressing: [] Met: [] Not Met: [] Adjusted  2. Patient will demonstrate increased AROM to 100 deg of hip flexion, 35 deg of hip abduction, IR, ER to 25 degrees to allow for proper joint functioning for bending forward and donning/doffing shoes and socks. [] Progressing: [] Met: [] Not Met: [] Adjusted  3. Patient will demonstrate an increase in Strength to at least 4+/5 for the left hip stabilizers and 5/5 for the left knee in order to [facility] to [facility] ambulation s AD with good pelvic control; return to [facility] chores like cleaning and cooking. [] Progressing: [] Met: [] Not Met: [] Adjusted  4. Patient will return to community ambulation with a [facility] and  ambulation s AD with good pelvic stability and with minimal pain.    [] Progressing: [] Met: [] Not

## 2020-03-16 RX ORDER — MONTELUKAST SODIUM 10 MG/1
10 TABLET ORAL NIGHTLY
Qty: 30 TABLET | Refills: 0 | Status: SHIPPED | OUTPATIENT
Start: 2020-03-16 | End: 2020-04-17

## 2020-03-19 ENCOUNTER — APPOINTMENT (OUTPATIENT)
Dept: PHYSICAL THERAPY | Age: 75
End: 2020-03-19
Payer: MEDICARE

## 2020-03-25 ENCOUNTER — TELEPHONE (OUTPATIENT)
Dept: ORTHOPEDIC SURGERY | Age: 75
End: 2020-03-25

## 2020-03-25 NOTE — TELEPHONE ENCOUNTER
1/29/20 LT LAT THR REVISION    Patient is having a lot of pain below her knee and at her heel. She thinks its nerve pain. Wanted to know if someone could call her?

## 2020-03-26 ENCOUNTER — TELEPHONE (OUTPATIENT)
Dept: FAMILY MEDICINE CLINIC | Age: 75
End: 2020-03-26

## 2020-03-27 ENCOUNTER — APPOINTMENT (OUTPATIENT)
Dept: PHYSICAL THERAPY | Age: 75
End: 2020-03-27
Payer: MEDICARE

## 2020-04-02 ENCOUNTER — TELEPHONE (OUTPATIENT)
Dept: ORTHOPEDIC SURGERY | Age: 75
End: 2020-04-02

## 2020-04-02 RX ORDER — HYDROCODONE BITARTRATE AND ACETAMINOPHEN 5; 325 MG/1; MG/1
1 TABLET ORAL EVERY 6 HOURS PRN
Qty: 28 TABLET | Refills: 0 | Status: SHIPPED | OUTPATIENT
Start: 2020-04-02 | End: 2020-04-09

## 2020-04-17 RX ORDER — MONTELUKAST SODIUM 10 MG/1
10 TABLET ORAL NIGHTLY
Qty: 30 TABLET | Refills: 0 | Status: SHIPPED | OUTPATIENT
Start: 2020-04-17 | End: 2020-06-15

## 2020-06-15 ENCOUNTER — TELEPHONE (OUTPATIENT)
Dept: FAMILY MEDICINE CLINIC | Age: 75
End: 2020-06-15

## 2020-06-15 RX ORDER — AMLODIPINE BESYLATE 5 MG/1
10 TABLET ORAL NIGHTLY
Qty: 60 TABLET | Refills: 3 | Status: SHIPPED | OUTPATIENT
Start: 2020-06-15 | End: 2020-11-30

## 2020-06-16 ENCOUNTER — TELEPHONE (OUTPATIENT)
Dept: FAMILY MEDICINE CLINIC | Age: 75
End: 2020-06-16

## 2020-06-24 ENCOUNTER — VIRTUAL VISIT (OUTPATIENT)
Dept: FAMILY MEDICINE CLINIC | Age: 75
End: 2020-06-24
Payer: MEDICARE

## 2020-06-24 PROCEDURE — 99214 OFFICE O/P EST MOD 30 MIN: CPT | Performed by: FAMILY MEDICINE

## 2020-06-24 ASSESSMENT — ENCOUNTER SYMPTOMS
EYE PAIN: 0
DIARRHEA: 0
COLOR CHANGE: 0
TROUBLE SWALLOWING: 0
BACK PAIN: 0
SHORTNESS OF BREATH: 0
CHEST TIGHTNESS: 0
CONSTIPATION: 0
RHINORRHEA: 0

## 2020-06-24 NOTE — PROGRESS NOTES
MD Filiberto   lovastatin (MEVACOR) 20 MG tablet Take 1 tablet by mouth nightly Yes India Rabago MD   losartan (COZAAR) 50 MG tablet Take 1 tablet by mouth once daily Yes India Rabago MD   montelukast (SINGULAIR) 10 MG tablet Take 1 tablet by mouth nightly Yes India Rabago MD   amLODIPine (NORVASC) 5 MG tablet Take 2 tablets by mouth nightly  Patient taking differently: Take 5 mg by mouth nightly  Yes India Rabago MD   meloxicam (MOBIC) 15 MG tablet Take 1 tablet by mouth daily as needed for Pain Yes Romana Colas, PA-C   ondansetron (ZOFRAN ODT) 4 MG disintegrating tablet Take 1 tablet by mouth every 8 hours as needed for Nausea or Vomiting Yes ERIKA Olguin   carboxymethylcellulose 1 % ophthalmic solution Place 1 drop into both eyes 4 times daily Yes Historical Provider, MD   isosorbide mononitrate (IMDUR) 30 MG extended release tablet TAKE 1 TABLET BY MOUTH ONCE DAILY (NEED OFFICE VISIT) Yes India Rabago MD   metoprolol tartrate (LOPRESSOR) 25 MG tablet TAKE ONE TABLET BY MOUTH TWICE A DAY Yes India aRbago MD   Calcium Carbonate-Vitamin D (OYSTER SHELL CALCIUM/D) 500-200 MG-UNIT TABS TAKE 1 TABLET BY MOUTH ONCE DAILY  Patient taking differently: 2 times daily  Yes HIRAM George - CNP   guaiFENesin (MUCINEX) 600 MG extended release tablet Take 1,200 mg by mouth as needed  Yes Historical Provider, MD   Multiple Vitamins-Minerals (THERAPEUTIC MULTIVITAMIN-MINERALS) tablet Take 1 tablet by mouth daily Yes Historical Provider, MD   mometasone (NASONEX) 50 MCG/ACT nasal spray 2 sprays by Nasal route daily.  Each nostril Yes Chalo Brooks MD   aspirin 325 MG EC tablet Take 1 tablet by mouth 2 times daily  ERIKA Olguin   denosumab (PROLIA) 60 MG/ML SOLN SC injection Inject 1 mL into the skin every 6 months  Patient not taking: Reported on 6/24/2020  India Rabago MD   nitroGLYCERIN (NITROSTAT) 0.4 MG SL tablet Place 1 tablet under the tongue every 5 minutes as needed for Chest pain  Patient not taking: Reported on 6/24/2020  Sadiq Barnard MD   butalbital-acetaminophen-caffeine (FIORICET, ESGIC) -99 MG per tablet Take 1 tablet by mouth every 4 hours as needed for Headaches  Historical Provider, MD   polyvinyl alcohol-povidone (HYPOTEARS) 1.4-0.6 % ophthalmic solution 1-2 drops as needed.   Historical Provider, MD       Social History     Tobacco Use    Smoking status: Never Smoker    Smokeless tobacco: Never Used   Substance Use Topics    Alcohol use: No    Drug use: No        Allergies   Allergen Reactions    Ranolazine Other (See Comments)     Dizziness, confusion, constipation, nausea and agitation    Amoxicillin Rash and Swelling    Oxycodone Nausea And Vomiting     High blood pressure after taking accompanied with headache   ,   Past Medical History:   Diagnosis Date    Allergic rhinitis     Arthritis     CAD (coronary artery disease)     Fractures     Hyperlipidemia     Hypertension     Migraine headache     since 20's    [facility] disease ([facility]     Reactive airway disease 2008    Shingles 10/2019    GROIN LEFT    Sjogren's syndrome ([facility] 2012    Dr. Lila Chan   ,   Past Surgical History:   Procedure Laterality Date    ARTHROGRAPHY Left 1/8/2020    LEFT HIP ASPIRATION WITH SYNOVASURE  CPT CODE - 96788 performed by Davis Nair MD at [address] COLONOSCOPY  09/15/10    Dr. 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    LEFT DEQUERVAIN'S RELEASE performed by Cielo Eugene MD at [address] / FINGER Left 6/17/2019    EXCISION LEFT WRIST GANGLION performed by Cielo Eugene MD at [address] Left     broke the femur    HIP SURGERY      JOINT REPLACEMENT Left 04/2018    HIP    REVISION TOTAL HIP ARTHROPLASTY Left 1/29/2020    REVISION LEFT HEMIARTHROPLASTY TO LATERAL TOTAL HIP ARTHROPLASTY WITH CELLSAVER AND FASCIAILIACA BLOCK FOR PAIN CONTROL     MONTANA & NEPHEW  CPT CODE - 12814 performed by Vero Black MD at [facility]  07/2015    TUBAL LIGATION  1972   ,   Social History     Tobacco Use    Smoking status: Never Smoker    Smokeless tobacco: Never Used   Substance Use Topics    Alcohol use: No    Drug use: No   ,   Family History   Problem Relation Age of Onset    Heart Attack Father     High Cholesterol Father     Heart Disease Father     High Cholesterol Mother        PHYSICAL EXAMINATION:  [ INSTRUCTIONS:  \"[x]\" Indicates a positive item  \"[]\" Indicates a negative item  -- DELETE ALL ITEMS NOT EXAMINED]  Vital Signs: (As obtained by patient/caregiver or practitioner observation)    Blood pressure-  Heart rate-    Respiratory rate-    Temperature-  Pulse oximetry-     Constitutional: [] Appears well-developed and well-nourished [] No apparent distress      [] Abnormal-   Mental status  [] Alert and awake  [] Oriented to person/place/time []Able to follow commands      Eyes:  EOM    []  Normal  [] Abnormal-  Sclera  []  Normal  [] Abnormal -         Discharge []  None visible  [] Abnormal -    HENT:   [] Normocephalic, atraumatic.   [] Abnormal   [] Mouth/Throat: Mucous membranes are moist.     External Ears [] Normal  [] Abnormal-     Neck: [] No visualized mass     Pulmonary/Chest: [] Respiratory effort normal.  [] No visualized signs of difficulty breathing or respiratory distress        [] Abnormal-      Musculoskeletal:   [] Normal gait with no signs of ataxia         [] Normal range of motion of neck        [] Abnormal-       Neurological:        [] No Facial Asymmetry (Cranial nerve 7 motor function) (limited exam to video visit)          [] No gaze palsy        [] Abnormal-         Skin:        [] No significant exanthematous lesions or discoloration noted on facial skin         [] Abnormal-            Psychiatric:       [] Normal Affect [] No Hallucinations        [] Abnormal-     Other pertinent observable physical exam findings-     ASSESSMENT/PLAN:  1. Parkinson disease ([address]  Stable, continue carbidopa    2. Low back pain, unspecified back pain laterality, unspecified chronicity, unspecified whether sciatica present  Currently improved    3. Arthralgia of hip, unspecified laterality  Currently improved, continue home pt s.p thr    4. Hyperlipidemia, unspecified hyperlipidemia type  Controlled. Lab Results   Component Value Date    CHOL 195 01/22/2019    CHOL 158 05/25/2018    CHOL 167 12/22/2016     Lab Results   Component Value Date    TRIG 131 01/22/2019    TRIG 207 (H) 05/25/2018    TRIG 139 12/22/2016     Lab Results   Component Value Date    HDL 45 01/22/2019    HDL 43 05/25/2018    HDL 47 07/28/2017     Lab Results   Component Value Date    LDLCALC 124 (H) 01/22/2019    LDLCALC 74 05/25/2018    LDLCALC 102 (H) 07/28/2017     Lab Results   Component Value Date    LABVLDL 26 01/22/2019    LABVLDL 41 05/25/2018    LABVLDL 22 07/28/2017     No results found for: CHOLHDLRATIO      5. Sjogren's syndrome, with unspecified organ involvement ([address]  Well controlled    6. Hypertension, unspecified type  Well controlled, continue current meds      No follow-ups on file. Archie Phillips is a 76 y.o. female being evaluated by a Virtual Visit (video visit) encounter to address concerns as mentioned above. A caregiver was present when appropriate. Due to this being a TeleHealth encounter (During [facility] public health emergency), evaluation of the following organ systems was limited: Vitals/Constitutional/EENT/Resp/CV/GI//MS/Neuro/Skin/Heme-Lymph-Imm.   Pursuant to the emergency declaration under the [facility] waiver authority and the [employer] Act, this Virtual Visit was conducted with patient's (and/or legal guardian's) consent, to reduce the

## 2020-07-24 ENCOUNTER — TELEPHONE (OUTPATIENT)
Dept: FAMILY MEDICINE CLINIC | Age: 75
End: 2020-07-24

## 2020-07-24 NOTE — TELEPHONE ENCOUNTER
Informed pt and she informed that she has not seen her Neurologist since 2012 and would need new one. Please send to [address] [place] [address] [address] Neurology.

## 2020-07-24 NOTE — TELEPHONE ENCOUNTER
----- Message from [facility] sent at 7/24/2020  9:31 AM EDT -----  Subject: Message to Provider    QUESTIONS  Information for Provider? Patient has a question about her Parkinson   disease and would like a call back from the doctor. Her body is stiff and   sore and she would like to know should she schedule to see her PCP or a   specialist   please advise.  ---------------------------------------------------------------------------  --------------  CALL BACK INFO  What is the best way for the office to contact you? OK to leave message on   voicemail  Preferred Call Back Phone Number? [phone]  ---------------------------------------------------------------------------  --------------  SCRIPT ANSWERS  Relationship to Patient?  Self

## 2020-08-18 ENCOUNTER — HOSPITAL ENCOUNTER (OUTPATIENT)
Age: 75
Discharge: HOME | End: 2020-08-18
Payer: MEDICARE

## 2020-08-18 LAB
A/G RATIO: 1 (ref 1.1–2.2)
ALBUMIN SERPL-MCNC: 4 G/DL (ref 3.4–5)
ALP BLD-CCNC: 66 U/L (ref 40–129)
ALT SERPL-CCNC: <5 U/L (ref 10–40)
ANION GAP SERPL CALCULATED.3IONS-SCNC: 10 MMOL/L (ref 3–16)
AST SERPL-CCNC: 20 U/L (ref 15–37)
BASOPHILS ABSOLUTE: 0 K/UL (ref 0–0.2)
BASOPHILS RELATIVE PERCENT: 1.2 %
BILIRUB SERPL-MCNC: 0.3 MG/DL (ref 0–1)
BUN BLDV-MCNC: 15 MG/DL (ref 7–20)
C-REACTIVE PROTEIN: 1 MG/L (ref 0–5.1)
CALCIUM SERPL-MCNC: 9.4 MG/DL (ref 8.3–10.6)
CHLORIDE BLD-SCNC: 103 MMOL/L (ref 99–110)
CO2: 27 MMOL/L (ref 21–32)
CREAT SERPL-MCNC: 0.8 MG/DL (ref 0.6–1.2)
EOSINOPHILS ABSOLUTE: 0 K/UL (ref 0–0.6)
EOSINOPHILS RELATIVE PERCENT: 0.8 %
GFR AFRICAN AMERICAN: >60
GFR NON-AFRICAN AMERICAN: >60
GLOBULIN: 4.1 G/DL
GLUCOSE BLD-MCNC: 61 MG/DL (ref 70–99)
HCT VFR BLD CALC: 37 % (ref 36–48)
HEMOGLOBIN: 12.6 G/DL (ref 12–16)
LYMPHOCYTES ABSOLUTE: 1.1 K/UL (ref 1–5.1)
LYMPHOCYTES RELATIVE PERCENT: 26.9 %
MCH RBC QN AUTO: 33.1 PG (ref 26–34)
MCHC RBC AUTO-ENTMCNC: 34.1 G/DL (ref 31–36)
MCV RBC AUTO: 97.1 FL (ref 80–100)
MONOCYTES ABSOLUTE: 0.4 K/UL (ref 0–1.3)
MONOCYTES RELATIVE PERCENT: 10.8 %
NEUTROPHILS ABSOLUTE: 2.4 K/UL (ref 1.7–7.7)
NEUTROPHILS RELATIVE PERCENT: 60.3 %
PDW BLD-RTO: 13.8 % (ref 12.4–15.4)
PLATELET # BLD: 193 K/UL (ref 135–450)
PMV BLD AUTO: 7.8 FL (ref 5–10.5)
POTASSIUM SERPL-SCNC: 4.5 MMOL/L (ref 3.5–5.1)
RBC # BLD: 3.81 M/UL (ref 4–5.2)
SEDIMENTATION RATE, ERYTHROCYTE: 65 MM/HR (ref 0–30)
SODIUM BLD-SCNC: 140 MMOL/L (ref 136–145)
T4 FREE: 0.9 NG/DL (ref 0.9–1.8)
TOTAL PROTEIN: 8.1 G/DL (ref 6.4–8.2)
TSH SERPL DL<=0.05 MIU/L-ACNC: 2.37 UIU/ML (ref 0.27–4.2)
VITAMIN B-12: 662 PG/ML (ref 211–911)
VITAMIN D 25-HYDROXY: 43.9 NG/ML
WBC # BLD: 4 K/UL (ref 4–11)

## 2020-08-18 PROCEDURE — 85025 COMPLETE CBC W/AUTO DIFF WBC: CPT

## 2020-08-18 PROCEDURE — 85652 RBC SED RATE AUTOMATED: CPT

## 2020-08-18 PROCEDURE — 86140 C-REACTIVE PROTEIN: CPT

## 2020-08-18 PROCEDURE — 84439 ASSAY OF FREE THYROXINE: CPT

## 2020-08-18 PROCEDURE — 82306 VITAMIN D 25 HYDROXY: CPT

## 2020-08-18 PROCEDURE — 82607 VITAMIN B-12: CPT

## 2020-08-18 PROCEDURE — 80053 COMPREHEN METABOLIC PANEL: CPT

## 2020-08-18 PROCEDURE — 84443 ASSAY THYROID STIM HORMONE: CPT

## 2020-08-18 PROCEDURE — 36415 COLL VENOUS BLD VENIPUNCTURE: CPT

## 2020-08-25 ENCOUNTER — TELEPHONE (OUTPATIENT)
Dept: FAMILY MEDICINE CLINIC | Age: 75
End: 2020-08-25

## 2020-08-25 RX ORDER — PREDNISONE 1 MG/1
TABLET ORAL
Qty: 70 TABLET | Refills: 0 | Status: SHIPPED | OUTPATIENT
Start: 2020-08-25 | End: 2020-10-27 | Stop reason: SDUPTHER

## 2020-08-25 NOTE — TELEPHONE ENCOUNTER
Patient called and stated that she just talked to Dr. Aicha Mcelroy regarding her lab results. She said that Dr. Odalys Mao diagnosed her with Polymyalgia. Patient stated that Dr. Odalys Mao had told her that she would send a copy of the lab results to Dr. Wendie Richards so that he can send in a prescription for Prednisone. Please send rx to [facility].

## 2020-09-25 ENCOUNTER — TELEPHONE (OUTPATIENT)
Dept: FAMILY MEDICINE CLINIC | Age: 75
End: 2020-09-25

## 2020-09-25 NOTE — TELEPHONE ENCOUNTER
----- Message from Vikram Bourne sent at 9/25/2020  3:07 PM EDT -----  Subject: Message to Provider    QUESTIONS  Information for Provider? Patient wanted to inform the office that she has   gotten her flu shot.  ---------------------------------------------------------------------------  --------------  [address]  What is the best way for the office to contact you? OK to leave message on   voicemail  Preferred Call Back Phone Number? [phone]  ---------------------------------------------------------------------------  --------------  SCRIPT ANSWERS  Relationship to Patient?  Self

## 2020-10-26 RX ORDER — PREDNISONE 1 MG/1
TABLET ORAL
Qty: 70 TABLET | Refills: 0 | OUTPATIENT
Start: 2020-10-26 | End: 2020-11-23

## 2020-10-27 ENCOUNTER — VIRTUAL VISIT (OUTPATIENT)
Dept: FAMILY MEDICINE CLINIC | Age: 75
End: 2020-10-27
Payer: MEDICARE

## 2020-10-27 PROCEDURE — 99442 PR PHYS/QHP TELEPHONE EVALUATION 11-20 MIN: CPT | Performed by: FAMILY MEDICINE

## 2020-10-27 RX ORDER — PREDNISONE 1 MG/1
TABLET ORAL
Qty: 70 TABLET | Refills: 0 | Status: SHIPPED | OUTPATIENT
Start: 2020-10-27 | End: 2020-11-24

## 2020-10-27 RX ORDER — ALPRAZOLAM 1 MG/1
0.5 TABLET ORAL NIGHTLY PRN
Qty: 30 TABLET | Refills: 0 | Status: SHIPPED | OUTPATIENT
Start: 2020-10-27 | End: 2021-03-29

## 2020-10-27 NOTE — PROGRESS NOTES
Keila Gonzalez is a 76 y.o. female evaluated via telephone on 10/27/2020. Consent:  She and/or health care decision maker is aware that that she may receive a bill for this telephone service, depending on her insurance coverage, and has provided verbal consent to proceed: Yes      Documentation:  I communicated with the patient and/or health care decision maker about Patient has been having more tremors and pain in her arms bilaterally. She notes she feels like this is worsening over time. Patient notes she often feels very tired. Patient notes she is having increased issues with weakness and feeling that she is unsteady on her feet. .   Details of this discussion including any medical advice provided  1. Parkinson disease [facility])    [facility]    2. Primary insomnia    - ALPRAZolam (XANAX) 1 MG tablet; Take 0.5 tablets by mouth nightly as needed for Sleep for up to 60 days. Dispense: 30 tablet; Refill: 0          I affirm this is a Patient Initiated Episode with a Patient who has not had a related appointment within my department in the past 7 days or scheduled within the next 24 hours.     Patient identification was verified at the start of the visit: Yes    Total Time: minutes: 11-20 minutes    Note: not billable if this call serves to triage the patient into an appointment for the relevant concern      Lucy Mccabe

## 2020-11-12 ENCOUNTER — TELEPHONE (OUTPATIENT)
Dept: FAMILY MEDICINE CLINIC | Age: 75
End: 2020-11-12

## 2020-11-12 NOTE — TELEPHONE ENCOUNTER
She will contact Ilya to see if she can just switch to someone else in that practice. [facility] to far for her to go to.

## 2020-11-12 NOTE — TELEPHONE ENCOUNTER
Patient was given a ref to [address] [place] [address] Neurology were she was seen, patient does not like this office and did not feel comfortable there. She then saw a Dr Julieta Fletcher within [facility] and he stated that he could not do anything more than what Christel was doing. Patient does not want to go back to [address] [place] [address] Neurology.       Please advise patient [phone]

## 2020-11-30 ENCOUNTER — TELEPHONE (OUTPATIENT)
Dept: FAMILY MEDICINE CLINIC | Age: 75
End: 2020-11-30

## 2020-11-30 NOTE — LETTER
[address]  Phone: [phone]  Fax: [phone]    Raquel Rodriguez MD        November 30, 2020     Patient: Azeem Tidwell   YOB: 1945   Date of Visit: 11/30/2020       To Whom It May Concern: It is my medical opinion that Deanne Rose requires a disability parking placard for the following reasons:  She cannot walk 200 feet without stopping to rest.  Duration of need: 2 years    If you have any questions or concerns, please don't hesitate to call.     Sincerely,        Raquel Rodriguez MD

## 2020-11-30 NOTE — TELEPHONE ENCOUNTER
----- Message from Byronjamey Grijalvadavid sent at 11/27/2020 10:04 AM EST -----  Subject: Message to Provider    QUESTIONS  Information for Provider? Pt needs new prescription for handicap plaque   for car. Please call pt for any further questions.  ---------------------------------------------------------------------------  --------------  CALL BACK INFO  What is the best way for the office to contact you? OK to leave message on   voicemail  Preferred Call Back Phone Number? [phone]  ---------------------------------------------------------------------------  --------------  SCRIPT ANSWERS  Relationship to Patient?  Self

## 2020-12-28 ENCOUNTER — TELEPHONE (OUTPATIENT)
Dept: FAMILY MEDICINE CLINIC | Age: 75
End: 2020-12-28

## 2020-12-28 RX ORDER — ALENDRONATE SODIUM 70 MG/1
70 TABLET ORAL
Qty: 4 TABLET | Refills: 5 | Status: SHIPPED | OUTPATIENT
Start: 2020-12-28 | End: 2021-04-01 | Stop reason: SDUPTHER

## 2021-01-07 ENCOUNTER — OFFICE VISIT (OUTPATIENT)
Dept: FAMILY MEDICINE CLINIC | Age: 76
End: 2021-01-07
Payer: MEDICARE

## 2021-01-07 VITALS
WEIGHT: 128 LBS | DIASTOLIC BLOOD PRESSURE: 72 MMHG | BODY MASS INDEX: 23.41 KG/M2 | SYSTOLIC BLOOD PRESSURE: 142 MMHG | HEART RATE: 71 BPM | OXYGEN SATURATION: 98 % | TEMPERATURE: 97.1 F

## 2021-01-07 DIAGNOSIS — R53.83 DECREASED ENERGY: ICD-10-CM

## 2021-01-07 DIAGNOSIS — N32.81 OAB (OVERACTIVE BLADDER): Primary | ICD-10-CM

## 2021-01-07 PROCEDURE — 99214 OFFICE O/P EST MOD 30 MIN: CPT | Performed by: FAMILY MEDICINE

## 2021-01-07 RX ORDER — FLUOXETINE 10 MG/1
10 CAPSULE ORAL DAILY
Qty: 30 CAPSULE | Refills: 3 | Status: SHIPPED | OUTPATIENT
Start: 2021-01-07 | End: 2021-03-01 | Stop reason: SINTOL

## 2021-01-07 ASSESSMENT — PATIENT HEALTH QUESTIONNAIRE - PHQ9
1. LITTLE INTEREST OR PLEASURE IN DOING THINGS: 0
SUM OF ALL RESPONSES TO PHQ QUESTIONS 1-9: 1
SUM OF ALL RESPONSES TO PHQ QUESTIONS 1-9: 1

## 2021-01-17 NOTE — PROGRESS NOTES
Teodoro Siegel (:  1945) is a 76 y.o. female,Established patient, here for evaluation of the following chief complaint(s):  Fatigue (muscle pain, heavy legs )      ASSESSMENT/PLAN:  1. OAB (overactive bladder)  -     mirabegron (MYRBETRIQ) 25 MG TB24; Take 1 tablet by mouth daily, Disp-30 tablet, R-5Normal  2. Decreased energy  -     FLUoxetine (PROZAC) 10 MG capsule; Take 1 capsule by mouth daily, Disp-30 capsule, R-3Normal      No follow-ups on file. SUBJECTIVE/OBJECTIVE:  Teodoro Siegel is a 76 y.o. female. Patient presents with:  Fatigue: muscle pain, heavy legs       Patient is feeling fatigued all of the time. Often feels that she is having muscle pains and that her legs feel heavy. She feels that she is often down and notes she often does not sleep well. Poor appetite. She also has been having issues with frequent urination  She notes she has to go to bathroom hourlsy durin the day. The patients PMH, surgical history, family history, medications, allergies were all reviewed and updated as appropriate today. Fatigue  This is a recurrent problem. The current episode started more than 1 month ago. The problem occurs constantly. The problem has been gradually worsening. Associated symptoms include fatigue. Nothing aggravates the symptoms. She has tried nothing for the symptoms. Review of Systems   Constitutional: Positive for fatigue. Physical Exam  Vitals signs and nursing note reviewed. Constitutional:       Appearance: Normal appearance. She is well-developed. HENT:      Head: Normocephalic and atraumatic. Right Ear: External ear normal.      Left Ear: External ear normal.      Nose: Nose normal.   Eyes:      Conjunctiva/sclera: Conjunctivae normal.      Pupils: Pupils are equal, round, and reactive to light. Neck:      Musculoskeletal: Normal range of motion and neck supple. Cardiovascular:      Rate and Rhythm: Normal rate and regular rhythm. Heart sounds: Normal heart sounds. Pulmonary:      Effort: Pulmonary effort is normal.      Breath sounds: Normal breath sounds. Abdominal:      General: Bowel sounds are normal.      Palpations: Abdomen is soft. Musculoskeletal: Normal range of motion. Skin:     General: Skin is dry. Neurological:      Mental Status: She is alert and oriented to person, place, and time. Deep Tendon Reflexes: Reflexes are normal and symmetric. Psychiatric:         Behavior: Behavior normal.         Thought Content: Thought content normal.         Judgment: Judgment normal.                 An electronic signature was used to authenticate this note.     --Jalyn Nguyen MD

## 2021-03-01 ENCOUNTER — VIRTUAL VISIT (OUTPATIENT)
Dept: FAMILY MEDICINE CLINIC | Age: 76
End: 2021-03-01
Payer: MEDICARE

## 2021-03-01 ENCOUNTER — NURSE TRIAGE (OUTPATIENT)
Dept: OTHER | Facility: CLINIC | Age: 76
End: 2021-03-01

## 2021-03-01 DIAGNOSIS — R09.1 PLEURISY: Primary | ICD-10-CM

## 2021-03-01 PROCEDURE — 99213 OFFICE O/P EST LOW 20 MIN: CPT | Performed by: NURSE PRACTITIONER

## 2021-03-01 NOTE — PROGRESS NOTES
Emeli Malcolm is a 76 y.o. female evaluated via telephone on 3/1/2021. Consent:  She and/or health care decision maker is aware that that she may receive a bill for this telephone service, depending on her insurance coverage, and has provided verbal consent to proceed: Yes      Documentation:  I communicated with the patient and/or health care decision maker about. Thinks she has pleurisy- states she had it 12 years ago and it feels the same  No SOB, cough-not more than usual, no wheezing  102 F after she had COVID 19 2nd shot Feb 24th but no fever since then. She states when she takes a deep breath in she has some pain and it started last Friday. States she took the [employer] yesterday and today and it feels better. 8/10 with deep breath this morning before Mobic    Details of this discussion including any medical advice provided:   Pleurisy: continue mobic and suggested extra strength tylenol if needed  Discussed not to take ibuprofen and mobic together  If not getting better can call back       I affirm this is a Patient Initiated Episode with a Patient who has not had a related appointment within my department in the past 7 days or scheduled within the next 24 hours. Patient identification was verified at the start of the visit: Yes    Total Time: minutes: 11-20 minutes    The visit was conducted pursuant to the emergency declaration under the [address] [place] [address], [address] [place] [address] authority and the [employer] Act. Patient identification was verified, and a caregiver was present when appropriate. The patient was located in a state where the provider was credentialed to provide care.     Note: not billable if this call serves to triage the patient into an appointment for the relevant concern      Saunders Bamberger

## 2021-03-01 NOTE — TELEPHONE ENCOUNTER
Patient called Zaida Baca at [facility])  with red flag complaint. Brief description of triage: Patient states that she has constant chest pain that radiates to her right arm and upper back. Patient states that it started four days ago. Triage indicates for patient to go to ED now. Care advice provided, patient verbalizes understanding; denies any other questions or concerns; instructed to call back for any new or worsening symptoms. Attention Provider: Thank you for allowing me to participate in the care of your patient. The patient was connected to triage in response to information provided to the [facility]. Please do not respond through this encounter as the response is not directed to a shared pool. Reason for Disposition   Pain also in shoulder(s) or arm(s) or jaw    Answer Assessment - Initial Assessment Questions  1. LOCATION: \"Where does it hurt? \"        \"right across my breasts\"    2. RADIATION: \"Does the pain go anywhere else? \" (e.g., into neck, jaw, arms, back)      Radiates to upper back and to right arm    3. ONSET: \"When did the chest pain begin? \" (Minutes, hours or days)       Friday, four days ago    4. PATTERN \"Does the pain come and go, or has it been constant since it started? \"  \"Does it get worse with exertion? \"       Constant    5. DURATION: \"How long does it last\" (e.g., seconds, minutes, hours)      Constant    6. SEVERITY: \"How bad is the pain? \"  (e.g., Scale 1-10; mild, moderate, or severe)     - MILD (1-3): doesn't interfere with normal activities      - MODERATE (4-7): interferes with normal activities or awakens from sleep     - SEVERE (8-10): excruciating pain, unable to do any normal activities        4/10 with no activity, 8/10 with activity    7. CARDIAC RISK FACTORS: \"Do you have any history of heart problems or risk factors for heart disease? \" (e.g., angina, prior heart attack; diabetes, high blood pressure, high cholesterol, smoker, or strong family history of heart disease)      High blood pressure    8. PULMONARY RISK FACTORS: \"Do you have any history of lung disease? \"  (e.g., blood clots in lung, asthma, emphysema, birth control pills)      No    9. CAUSE: \"What do you think is causing the chest pain? \"      Patient thinks it is pleurisy, had in 2008 and this feels the same    10. OTHER SYMPTOMS: \"Do you have any other symptoms? \" (e.g., dizziness, nausea, vomiting, sweating, fever, difficulty breathing, cough)        Mild intermittent nausea, \"I've had that a long time\"    11. PREGNANCY: \"Is there any chance you are pregnant? \" \"When was your last menstrual period? \"        no    Protocols used: CHEST PAIN-ADULT-[place]

## 2021-03-19 ENCOUNTER — TELEPHONE (OUTPATIENT)
Dept: FAMILY MEDICINE CLINIC | Age: 76
End: 2021-03-19

## 2021-03-19 NOTE — TELEPHONE ENCOUNTER
----- Message from Anne Shaw sent at 3/19/2021  9:35 AM EDT -----  Subject: Message to Provider    QUESTIONS  Information for Provider? Pt would like to know if Nika Gearing any medication   shampoo that she can put on her hair. Pt says her hair is getting   tremendously thin. hair comes out in clumps . Please have provider follow   up.  ---------------------------------------------------------------------------  --------------  Patient Home Monitoring  What is the best way for the office to contact you? OK to leave message on   voicemail  Preferred Call Back Phone Number? [phone]  ---------------------------------------------------------------------------  --------------  SCRIPT ANSWERS  Relationship to Patient?  Self

## 2021-03-27 DIAGNOSIS — F51.01 PRIMARY INSOMNIA: ICD-10-CM

## 2021-03-29 RX ORDER — ALPRAZOLAM 1 MG/1
TABLET ORAL
Qty: 30 TABLET | Refills: 0 | Status: SHIPPED | OUTPATIENT
Start: 2021-03-29 | End: 2021-11-02

## 2021-04-01 DIAGNOSIS — M85.80 OSTEOPENIA, UNSPECIFIED LOCATION: ICD-10-CM

## 2021-04-01 RX ORDER — MONTELUKAST SODIUM 10 MG/1
10 TABLET ORAL NIGHTLY
Qty: 30 TABLET | Refills: 5 | Status: SHIPPED | OUTPATIENT
Start: 2021-04-01 | End: 2021-07-08 | Stop reason: SDUPTHER

## 2021-04-01 RX ORDER — ALENDRONATE SODIUM 70 MG/1
70 TABLET ORAL
Qty: 4 TABLET | Refills: 5 | Status: SHIPPED | OUTPATIENT
Start: 2021-04-01 | End: 2021-09-21

## 2021-04-01 NOTE — TELEPHONE ENCOUNTER
[facility] rx request for the medication montelukast 10 mg # 30 please advised

## 2021-04-15 ENCOUNTER — TELEPHONE (OUTPATIENT)
Dept: FAMILY MEDICINE CLINIC | Age: 76
End: 2021-04-15

## 2021-04-15 ENCOUNTER — NURSE TRIAGE (OUTPATIENT)
Dept: OTHER | Facility: CLINIC | Age: 76
End: 2021-04-15

## 2021-04-15 NOTE — TELEPHONE ENCOUNTER
-Per YENNIFER Zhang [place] Manager Kavya Lu is checking to see if any [facility] providers are available to see Pt today. -Pt was told this update at 1:30 today 4/15/21.    -Pt would like Providers opinion on the best urgent care center to see If no [facility] Provider can work patient in.

## 2021-04-15 NOTE — TELEPHONE ENCOUNTER
-Carlos Haji Manager Kvng Alvarez is checking to see if any [facility] providers are available to see Pt today. -Pt was told this update at 1:30 today 4/15/21.     -Pt would like Providers opinion on the best urgent care center to see If no [place] Provider can work patient in.

## 2021-04-15 NOTE — TELEPHONE ENCOUNTER
----- Message from Shireen Collins sent at 4/15/2021  8:46 AM EDT -----  Subject: Message to Provider    QUESTIONS  Information for Provider? Pt has a lump on her throat the size of a half   dollar it is very sensitive & has gotten worse over night. PCP is out of   the office until Tuesday. She was return to [facility] but there are no   appointments available. Called office and office told me to send message. ---------------------------------------------------------------------------  --------------  Shamar COTTON  What is the best way for the office to contact you? OK to leave message on   voicemail  Preferred Call Back Phone Number? [phone]  ---------------------------------------------------------------------------  --------------  SCRIPT ANSWERS  Relationship to Patient?  Self

## 2021-04-15 NOTE — TELEPHONE ENCOUNTER
What about overflow at another [facility]? Is this an option? If not, pls see what's avail tomorrow. If nothing, pls rec ucc.

## 2021-04-15 NOTE — TELEPHONE ENCOUNTER
Reason for Disposition   [1] Swelling is painful to touch AND [2] no fever    Answer Assessment - Initial Assessment Questions  1. APPEARANCE of SWELLING: \"What does it look like? \" (e.g., lymph node, insect bite, mole)      Swollen lump on right side of throat    2. SIZE: \"How large is the swelling? \" (inches, cm or compare to coins)      About half dollar to silver dollar size,  It seems to have enlarged since yesterday or else it's more tender    3. LOCATION: \"Where is the swelling located? \"      Right side of throat/necd    4. ONSET: \"When did the swelling start? \"      I noticed it about a week ago. 5. PAIN: \"Is it painful? \" If so, ask: \"How much? \"      It is tender to touch, I can feel it when not touching it,  And it's uncomfortable to eat and drink    6. ITCH: \"Does it itch? \" If so, ask: \"How much? \"      No    7. CAUSE: \"What do you think caused the swelling? \"      I'm really not sure. 8. OTHER SYMPTOMS: \"Do you have any other symptoms? \" (e.g., fever)      No other symptoms    Protocols used: SKIN LUMP OR LOCALIZED SWELLING-ADULT-AH    Received call from Kelly Ambrosio at pre-service center [facility] with Red Flag Complaint. Brief description of triage: lump on right side of throat/neck    Triage indicates for patient to see PCP within 24 hours    Care advice provided, patient verbalizes understanding; denies any other questions or concerns; instructed to call back for any new or worsening symptoms. Writer provided warm transfer to [facility] for appointment scheduling. Attention Provider: Thank you for allowing me to participate in the care of your patient. The patient was connected to triage in response to information provided to the [facility]. Please do not respond through this encounter as the response is not directed to a shared pool.

## 2021-04-15 NOTE — TELEPHONE ENCOUNTER
-Nurse Triaged 4/15/21.  -Pt requesting Update if any Provider will double book an appt to see Pt 4/15 or 4/16 ?  -All providers appt are completely full 4/15 and 4/16. Swollen lump on right side of throat. Please Call Back Today 4/15/21.

## 2021-06-23 ENCOUNTER — TELEPHONE (OUTPATIENT)
Dept: FAMILY MEDICINE CLINIC | Age: 76
End: 2021-06-23

## 2021-06-23 NOTE — TELEPHONE ENCOUNTER
----- Message from Wendy Meneses sent at 6/23/2021 10:31 AM EDT -----  Subject: Message to Provider    QUESTIONS  Information for Provider? patient has some questions regarding if she is   okay with taking daily medication before her scheduled surgery on 07/06  ---------------------------------------------------------------------------  --------------  CALL BACK INFO  What is the best way for the office to contact you? OK to leave message on   voicemail  Preferred Call Back Phone Number? [phone]  ---------------------------------------------------------------------------  --------------  SCRIPT ANSWERS  Relationship to Patient?  Self

## 2021-06-23 NOTE — TELEPHONE ENCOUNTER
She should take her morning medications on the day of surgery with a sip of water.    She should not take meloxicam or alendronate for a week prior to surgery

## 2021-06-28 ENCOUNTER — OFFICE VISIT (OUTPATIENT)
Dept: FAMILY MEDICINE CLINIC | Age: 76
End: 2021-06-28
Payer: MEDICARE

## 2021-06-28 VITALS
SYSTOLIC BLOOD PRESSURE: 146 MMHG | DIASTOLIC BLOOD PRESSURE: 76 MMHG | OXYGEN SATURATION: 97 % | HEIGHT: 62 IN | HEART RATE: 65 BPM | TEMPERATURE: 97.7 F | WEIGHT: 121 LBS | BODY MASS INDEX: 22.26 KG/M2

## 2021-06-28 DIAGNOSIS — K11.20 SIALOADENITIS OF SUBMANDIBULAR GLAND: ICD-10-CM

## 2021-06-28 DIAGNOSIS — Z01.818 PRE-OP EXAM: Primary | ICD-10-CM

## 2021-06-28 LAB
ANION GAP SERPL CALCULATED.3IONS-SCNC: 11 MMOL/L (ref 3–16)
BASOPHILS ABSOLUTE: 0 K/UL (ref 0–0.2)
BASOPHILS RELATIVE PERCENT: 0.5 %
BUN BLDV-MCNC: 15 MG/DL (ref 7–20)
CALCIUM SERPL-MCNC: 8.4 MG/DL (ref 8.3–10.6)
CHLORIDE BLD-SCNC: 102 MMOL/L (ref 99–110)
CO2: 25 MMOL/L (ref 21–32)
CREAT SERPL-MCNC: 0.8 MG/DL (ref 0.6–1.2)
EOSINOPHILS ABSOLUTE: 0 K/UL (ref 0–0.6)
EOSINOPHILS RELATIVE PERCENT: 0.5 %
GFR AFRICAN AMERICAN: >60
GFR NON-AFRICAN AMERICAN: >60
GLUCOSE BLD-MCNC: 91 MG/DL (ref 70–99)
HCT VFR BLD CALC: 34.7 % (ref 36–48)
HEMOGLOBIN: 12 G/DL (ref 12–16)
LYMPHOCYTES ABSOLUTE: 1.2 K/UL (ref 1–5.1)
LYMPHOCYTES RELATIVE PERCENT: 23.3 %
MCH RBC QN AUTO: 33.6 PG (ref 26–34)
MCHC RBC AUTO-ENTMCNC: 34.4 G/DL (ref 31–36)
MCV RBC AUTO: 97.5 FL (ref 80–100)
MONOCYTES ABSOLUTE: 0.5 K/UL (ref 0–1.3)
MONOCYTES RELATIVE PERCENT: 9.8 %
NEUTROPHILS ABSOLUTE: 3.3 K/UL (ref 1.7–7.7)
NEUTROPHILS RELATIVE PERCENT: 65.9 %
PDW BLD-RTO: 13.7 % (ref 12.4–15.4)
PLATELET # BLD: 168 K/UL (ref 135–450)
PLATELET SLIDE REVIEW: ADEQUATE
PMV BLD AUTO: 8.2 FL (ref 5–10.5)
POTASSIUM SERPL-SCNC: 4.2 MMOL/L (ref 3.5–5.1)
RBC # BLD: 3.56 M/UL (ref 4–5.2)
RBC # BLD: NORMAL 10*6/UL
SLIDE REVIEW: ABNORMAL
SODIUM BLD-SCNC: 138 MMOL/L (ref 136–145)
WBC # BLD: 5 K/UL (ref 4–11)

## 2021-06-28 PROCEDURE — 36415 COLL VENOUS BLD VENIPUNCTURE: CPT | Performed by: NURSE PRACTITIONER

## 2021-06-28 PROCEDURE — 93000 ELECTROCARDIOGRAM COMPLETE: CPT | Performed by: NURSE PRACTITIONER

## 2021-06-28 PROCEDURE — 99213 OFFICE O/P EST LOW 20 MIN: CPT | Performed by: NURSE PRACTITIONER

## 2021-06-28 NOTE — PROGRESS NOTES
[facility]  [phone]  Fax: [phone]   Pre-operative History and Physical    Jose Page is a 76 y.o. female who is referred by Dr. Lyudmila Roque for a consultation for preoperative physical examination and medical clearance for surgery. Patient is scheduled to have Excision right Submandibular Gland at [facility] on 7-6-2021. DIAGNOSIS:  Sildenitis        History Obtained From:  patient    HISTORY OF PRESENT ILLNESS:    The patient is a 76 y.o. female with significant past medical history of siladenitis who presents with Pre-op.         Past Medical History:   Diagnosis Date    Allergic rhinitis     Arthritis     CAD (coronary artery disease)     Fractures     Hyperlipidemia     Hypertension     Migraine headache     since 20's    Parkinson disease ([address]     Reactive airway disease 2008    Shingles 10/2019    GROIN LEFT    [facility]jogren's syndrome ([facility] [address] 2012    Dr. Giovani Pickens     Past Surgical History:   Procedure Laterality Date    ARTHROGRAPHY Left 1/8/2020    LEFT HIP ASPIRATION WITH SYNOVASURE  CPT CODE - 04880 performed by Mirian Tong MD at [address] COLONOSCOPY  09/15/10    Dr. Mellisa Morales  12/22/2016    DENTAL SURGERY  12/04/2019    TEETH EXTRACTION    Moira Ganser CONTRACTURE SURGERY Left 6/17/2019    LEFT DEQUERVAIN'S RELEASE performed by Dilcia Manuel MD at [address] / FINGER Left 6/17/2019    EXCISION LEFT WRIST GANGLION performed by Dilcia Manuel MD at [address] Left     broke the femur    HIP SURGERY      JOINT REPLACEMENT Left 04/2018    HIP    REVISION TOTAL HIP ARTHROPLASTY Left 1/29/2020    REVISION LEFT HEMIARTHROPLASTY TO LATERAL TOTAL HIP ARTHROPLASTY WITH CELLSAVER AND FASCIAILIACA BLOCK FOR PAIN CONTROL     [place] & NEPHEW  CPT CODE - 23037 performed by Mirian Tong MD at [facility]  07/2015    TUBAL LIGATION  1972     Current Outpatient Medications   Medication Sig Dispense Refill    losartan (COZAAR) 50 MG tablet Take 1 tablet by mouth once daily 90 tablet 3    alendronate (FOSAMAX) 70 MG tablet Take 1 tablet by mouth every 7 days 4 tablet 5    amLODIPine (NORVASC) 5 MG tablet TAKE 2 TABLETS BY MOUTH NIGHTLY 60 tablet 5    isosorbide mononitrate (IMDUR) 30 MG extended release tablet TAKE 1 TABLET BY MOUTH ONCE DAILY PLEASE  SCHEDULE  OFFICE  VISIT. 90 tablet 11    metoprolol tartrate (LOPRESSOR) 25 MG tablet Take 1 tablet by mouth twice daily 60 tablet 11    carbidopa-levodopa (SINEMET)  MG per tablet Take 1 tablet by mouth 4 times daily 360 tablet 3    lovastatin (MEVACOR) 20 MG tablet Take 1 tablet by mouth nightly 90 tablet 3    meloxicam (MOBIC) 15 MG tablet Take 1 tablet by mouth daily as needed for Pain 90 tablet 1    carboxymethylcellulose 1 % ophthalmic solution Place 1 drop into both eyes 4 times daily      Calcium Carbonate-Vitamin D (OYSTER SHELL CALCIUM/D) 500-200 MG-UNIT TABS TAKE 1 TABLET BY MOUTH ONCE DAILY 30 tablet 0    Multiple Vitamins-Minerals (THERAPEUTIC MULTIVITAMIN-MINERALS) tablet Take 1 tablet by mouth daily      mometasone (NASONEX) 50 MCG/ACT nasal spray 2 sprays by Nasal route daily. Each nostril 3 g 3    polyvinyl alcohol-povidone (HYPOTEARS) 1.4-0.6 % ophthalmic solution 1-2 drops as needed.       montelukast (SINGULAIR) 10 MG tablet Take 1 tablet by mouth nightly (Patient not taking: Reported on 6/28/2021) 30 tablet 5    guaiFENesin (MUCINEX) 600 MG extended release tablet Take 1,200 mg by mouth as needed  (Patient not taking: Reported on 6/28/2021)      nitroGLYCERIN (NITROSTAT) 0.4 MG SL tablet Place 1 tablet under the tongue every 5 minutes as needed for Chest pain (Patient not taking: Reported on 6/24/2020) 25 tablet 3    butalbital-acetaminophen-caffeine (FIORICET, ESGIC) -40 MG per tablet Take 1 tablet by mouth every 4 hours as needed for Headaches (Patient not taking: Reported on 6/28/2021)       No current facility-administered medications for this visit. Allergies:  Amoxicillin and Oxycodone  History of allergic reaction to anesthesia:  No     Social History     Tobacco Use   Smoking Status Never Smoker   Smokeless Tobacco Never Used     The patient states she drinks 0 per week. Family History   Problem Relation Age of Onset    Heart Attack Father     High Cholesterol Father     Heart Disease Father     High Cholesterol Mother        REVIEW OF SYSTEMS:    CONSTITUTIONAL:  negative  EYES:  negative  HEENT:  negative  RESPIRATORY:  negative  CARDIOVASCULAR:  negative  GASTROINTESTINAL:  negative  GENITOURINARY:  negative  INTEGUMENT/BREAST:  negative  HEMATOLOGIC/LYMPHATIC:  negative  ALLERGIC/IMMUNOLOGIC:  negative  ENDOCRINE:  negative  MUSCULOSKELETAL:  negative  NEUROLOGICAL:  negative    PHYSICAL EXAM:      BP (!) 146/76   Pulse 65   Temp 97.7 °F (36.5 °C)   Ht 5' 2\" (1.575 m)   Wt 121 lb (54.9 kg)   SpO2 97%   BMI 22.13 kg/m²     CONSTITUTIONAL:  awake, alert, cooperative, no apparent distress, and appears stated age    Eyes:  Lids and lashes normal, pupils equal, round and reactive to light, extra ocular muscles intact, sclera clear, conjunctiva normal    Head/ENT:  Normocephalic, without obvious abnormality, atramatic, sinuses nontender on palpation, external ears without lesions, oral pharynx with moist mucus membranes, tonsils without erythema or exudates, gums normal and good dentition.     Neck:  Supple, symmetrical, trachea midline, no adenopathy, thyroid symmetric, not enlarged and no tenderness, skin normal    Heart:  Normal apical impulse, regular rate and rhythm, normal S1 and S2, no S3 or S4, and no murmur noted    Lungs:  No increased work of breathing, good air exchange, clear to auscultation bilaterally, no crackles or wheezing    Abdomen:   normal bowel sounds, soft, non-distended, non-tender, no masses palpated, no hepatosplenomegally    Extremities:  No clubbing, cyanosis, or edema    NEUROLOGIC:  Awake, alert, oriented to name, place and time. DATA:  EKG:  Date:  6-  I have reviewed EKG with the following interpretation:  Impression:  Sinus Rhythm. ASSESSMENT AND PLAN:    1. Patient is a 76 y.o. female with above specified procedure planned on 7-6-2021 with Dr. Lio Bansal at [facility] .   2. Stop NSAIDS/AsA medications 7-10 days prior to procedure. 3.Patient is cleared for surgery  4. Pre-op faxed to Dr. Lio Bansal. 5. History of joint replacement. According to current guidelines no prophylaxis antibiotic is needed prior to this procedure. Discussed with Dr. Melania Simon.      Noe Rowley, APRN - CNP, CNP    [address]  [phone]

## 2021-07-08 RX ORDER — MONTELUKAST SODIUM 10 MG/1
10 TABLET ORAL NIGHTLY
Qty: 30 TABLET | Refills: 5 | Status: SHIPPED | OUTPATIENT
Start: 2021-07-08 | End: 2022-01-17

## 2021-07-08 NOTE — TELEPHONE ENCOUNTER
Last Office Visit  -  0-  Next Office Visit  -  na  Last Filled  -    Last UDS -   Contract -     Refill metoprol 25 mg # 60  Montelukast 10 mg # 30

## 2021-09-01 ENCOUNTER — OFFICE VISIT (OUTPATIENT)
Dept: FAMILY MEDICINE CLINIC | Age: 76
End: 2021-09-01
Payer: MEDICARE

## 2021-09-01 VITALS
BODY MASS INDEX: 23 KG/M2 | HEART RATE: 76 BPM | WEIGHT: 125 LBS | OXYGEN SATURATION: 97 % | DIASTOLIC BLOOD PRESSURE: 60 MMHG | HEIGHT: 62 IN | SYSTOLIC BLOOD PRESSURE: 126 MMHG

## 2021-09-01 DIAGNOSIS — H26.9 CATARACT OF BOTH EYES, UNSPECIFIED CATARACT TYPE: Primary | ICD-10-CM

## 2021-09-01 PROCEDURE — 99212 OFFICE O/P EST SF 10 MIN: CPT | Performed by: FAMILY MEDICINE

## 2021-09-01 ASSESSMENT — PATIENT HEALTH QUESTIONNAIRE - PHQ9
SUM OF ALL RESPONSES TO PHQ QUESTIONS 1-9: 0
2. FEELING DOWN, DEPRESSED OR HOPELESS: 0
SUM OF ALL RESPONSES TO PHQ QUESTIONS 1-9: 0
SUM OF ALL RESPONSES TO PHQ9 QUESTIONS 1 & 2: 0
1. LITTLE INTEREST OR PLEASURE IN DOING THINGS: 0
SUM OF ALL RESPONSES TO PHQ QUESTIONS 1-9: 0

## 2021-09-01 NOTE — PROGRESS NOTES
Take 1 tablet by mouth nightly 90 tablet 1    carbidopa-levodopa (SINEMET)  MG per tablet Take 1 tablet by mouth 4 times daily 360 tablet 1    losartan (COZAAR) 50 MG tablet Take 1 tablet by mouth once daily 90 tablet 3    alendronate (FOSAMAX) 70 MG tablet Take 1 tablet by mouth every 7 days 4 tablet 5    amLODIPine (NORVASC) 5 MG tablet TAKE 2 TABLETS BY MOUTH NIGHTLY 60 tablet 5    isosorbide mononitrate (IMDUR) 30 MG extended release tablet TAKE 1 TABLET BY MOUTH ONCE DAILY PLEASE  SCHEDULE  OFFICE  VISIT. 90 tablet 11    carboxymethylcellulose 1 % ophthalmic solution Place 1 drop into both eyes 4 times daily      Calcium Carbonate-Vitamin D (OYSTER SHELL CALCIUM/D) 500-200 MG-UNIT TABS TAKE 1 TABLET BY MOUTH ONCE DAILY 30 tablet 0    Multiple Vitamins-Minerals (THERAPEUTIC MULTIVITAMIN-MINERALS) tablet Take 1 tablet by mouth daily      polyvinyl alcohol-povidone (HYPOTEARS) 1.4-0.6 % ophthalmic solution 1-2 drops as needed.  montelukast (SINGULAIR) 10 MG tablet Take 1 tablet by mouth nightly (Patient not taking: Reported on 9/1/2021) 30 tablet 5    meloxicam (MOBIC) 15 MG tablet Take 1 tablet by mouth daily as needed for Pain (Patient not taking: Reported on 9/1/2021) 90 tablet 1    guaiFENesin (MUCINEX) 600 MG extended release tablet Take 1,200 mg by mouth as needed  (Patient not taking: Reported on 6/28/2021)      nitroGLYCERIN (NITROSTAT) 0.4 MG SL tablet Place 1 tablet under the tongue every 5 minutes as needed for Chest pain (Patient not taking: Reported on 6/24/2020) 25 tablet 3    mometasone (NASONEX) 50 MCG/ACT nasal spray 2 sprays by Nasal route daily. Each nostril (Patient not taking: Reported on 9/1/2021) 3 g 3     No current facility-administered medications for this visit.          Allergies:  Amoxicillin and Oxycodone  History of allergic reaction to anesthesia:  No     Social History     Tobacco Use   Smoking Status Never Smoker   Smokeless Tobacco Never Used     The patient states she drinks zero per week. Family History   Problem Relation Age of Onset    Heart Attack Father     High Cholesterol Father     Heart Disease Father     High Cholesterol Mother        REVIEW OF SYSTEMS:    CONSTITUTIONAL:  negative  EYES:  negative  HEENT:  negative  RESPIRATORY:  negative  CARDIOVASCULAR:  negative  GASTROINTESTINAL:  negative  GENITOURINARY:  negative  INTEGUMENT/BREAST:  negative  HEMATOLOGIC/LYMPHATIC:  negative  ALLERGIC/IMMUNOLOGIC:  negative  ENDOCRINE:  negative  MUSCULOSKELETAL:  negative  NEUROLOGICAL:  negative    PHYSICAL EXAM:      /60   Pulse 76   Ht 5' 2\" (1.575 m)   Wt 125 lb (56.7 kg)   SpO2 97%   BMI 22.86 kg/m²     CONSTITUTIONAL:  awake, alert, cooperative, no apparent distress, and appears stated age    Eyes:  Lids and lashes normal, pupils equal, round and reactive to light, extra ocular muscles intact, sclera clear, conjunctiva normal    Head/ENT:  Normocephalic, without obvious abnormality, atramatic, sinuses nontender on palpation, external ears without lesions, oral pharynx with moist mucus membranes, tonsils without erythema or exudates, gums normal and good dentition. Neck:  Supple, symmetrical, trachea midline, no adenopathy, thyroid symmetric, not enlarged and no tenderness, skin normal, No carotid bruit    Heart:  Normal apical impulse, regular rate and rhythm, normal S1 and S2, no S3 or S4, and no murmur noted    Lungs:  No increased work of breathing, good air exchange, clear to auscultation bilaterally, no crackles or wheezing    Abdomen:  No scars, normal bowel sounds, soft, non-distended, non-tender, no masses palpated, no hepatosplenomegally    Extremities:  No clubbing, cyanosis, or edema    NEUROLOGIC:  Awake, alert, oriented to name, place and time. Cranial nerves II-XII are grossly intact. Motor is 5 out of 5 bilaterally. ASSESSMENT AND PLAN:    1.   Patient is a 68 y.o. female with above specified procedure planned on 9/8/2021 and 9/22/2021 with Dr. Anitha Veras at [facility].       2. Cleared for surgery    Kaylyn Velazquez MD, M.D    [address] [place] [address]  [phone]

## 2021-09-13 ENCOUNTER — OFFICE VISIT (OUTPATIENT)
Dept: FAMILY MEDICINE CLINIC | Age: 76
End: 2021-09-13
Payer: MEDICARE

## 2021-09-13 ENCOUNTER — NURSE TRIAGE (OUTPATIENT)
Dept: OTHER | Facility: CLINIC | Age: 76
End: 2021-09-13

## 2021-09-13 ENCOUNTER — HOSPITAL ENCOUNTER (OUTPATIENT)
Dept: GENERAL RADIOLOGY | Age: 76
Discharge: HOME | End: 2021-09-13
Payer: MEDICARE

## 2021-09-13 ENCOUNTER — HOSPITAL ENCOUNTER (OUTPATIENT)
Age: 76
Discharge: HOME | End: 2021-09-13
Payer: MEDICARE

## 2021-09-13 VITALS
WEIGHT: 125 LBS | DIASTOLIC BLOOD PRESSURE: 70 MMHG | OXYGEN SATURATION: 98 % | BODY MASS INDEX: 22.86 KG/M2 | SYSTOLIC BLOOD PRESSURE: 130 MMHG | HEART RATE: 71 BPM

## 2021-09-13 DIAGNOSIS — R07.81 RIB PAIN ON LEFT SIDE: ICD-10-CM

## 2021-09-13 DIAGNOSIS — M25.562 ACUTE PAIN OF LEFT KNEE: ICD-10-CM

## 2021-09-13 DIAGNOSIS — R07.81 RIB PAIN ON LEFT SIDE: Primary | ICD-10-CM

## 2021-09-13 PROCEDURE — 99213 OFFICE O/P EST LOW 20 MIN: CPT | Performed by: NURSE PRACTITIONER

## 2021-09-13 PROCEDURE — 73562 X-RAY EXAM OF KNEE 3: CPT

## 2021-09-13 PROCEDURE — 71101 X-RAY EXAM UNILAT RIBS/CHEST: CPT

## 2021-09-13 RX ORDER — PREDNISOLONE ACETATE 10 MG/ML
SUSPENSION/ DROPS OPHTHALMIC
COMMUNITY
Start: 2021-08-30 | End: 2022-05-17

## 2021-09-13 RX ORDER — MOXIFLOXACIN 5 MG/ML
SOLUTION/ DROPS OPHTHALMIC
COMMUNITY
Start: 2021-08-30 | End: 2022-05-17

## 2021-09-13 RX ORDER — DICLOFENAC SODIUM 1 MG/ML
SOLUTION/ DROPS OPHTHALMIC
COMMUNITY
Start: 2021-08-30 | End: 2022-05-17

## 2021-09-13 ASSESSMENT — ENCOUNTER SYMPTOMS
SHORTNESS OF BREATH: 0
WHEEZING: 0
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
## 2021-09-28 ENCOUNTER — PATIENT MESSAGE (OUTPATIENT)
Dept: FAMILY MEDICINE CLINIC | Age: 76
End: 2021-09-28

## 2021-09-28 NOTE — TELEPHONE ENCOUNTER
From: Connie Morrison  To: Olen Sandhoff, APRN - CNP  Sent: 9/28/2021 5:04 PM EDT  Subject: Non-Urgent Medical Question    Krzysztof Torres. I have lost my handicap placard somewhere. I have searched for it everywhere. Would you please send me another letter for a new placard. ..and make it for as long as possible. Also, I need a prescription for Phenergan (promethazine) 25 mg suppositories. ..maybe for 6. I just had cataract surgery on both eyes and I can't see with or without my glasses very well. This makes me nauseated at times. Thank you kindly.     Fantasma Olvera, [address], [address] [place] [address]  [phone]

## 2021-10-06 ENCOUNTER — PATIENT MESSAGE (OUTPATIENT)
Dept: FAMILY MEDICINE CLINIC | Age: 76
End: 2021-10-06

## 2021-11-02 DIAGNOSIS — F51.01 PRIMARY INSOMNIA: ICD-10-CM

## 2021-11-02 RX ORDER — ISOSORBIDE MONONITRATE 30 MG/1
TABLET, EXTENDED RELEASE ORAL
Qty: 90 TABLET | Refills: 0 | Status: SHIPPED | OUTPATIENT
Start: 2021-11-02 | End: 2022-05-19

## 2021-11-02 RX ORDER — ALPRAZOLAM 1 MG/1
TABLET ORAL
Qty: 30 TABLET | Refills: 0 | Status: SHIPPED | OUTPATIENT
Start: 2021-11-02 | End: 2022-02-21

## 2021-11-02 NOTE — TELEPHONE ENCOUNTER
Last Office Visit  -  9/13/21  Next Office Visit  -  n/a  Last Filled  -  3/29/21  Last UDS -  n/a  Contract -  N/a

## 2021-12-20 DIAGNOSIS — M85.80 OSTEOPENIA, UNSPECIFIED LOCATION: ICD-10-CM

## 2021-12-20 RX ORDER — ALENDRONATE SODIUM 70 MG/1
TABLET ORAL
Qty: 12 TABLET | Refills: 0 | Status: SHIPPED | OUTPATIENT
Start: 2021-12-20 | End: 2022-06-20

## 2022-01-04 ENCOUNTER — TELEPHONE (OUTPATIENT)
Dept: FAMILY MEDICINE CLINIC | Age: 77
End: 2022-01-04

## 2022-01-04 DIAGNOSIS — J01.90 ACUTE BACTERIAL SINUSITIS: Primary | ICD-10-CM

## 2022-01-04 DIAGNOSIS — B96.89 ACUTE BACTERIAL SINUSITIS: Primary | ICD-10-CM

## 2022-01-04 RX ORDER — AZITHROMYCIN 250 MG/1
250 TABLET, FILM COATED ORAL SEE ADMIN INSTRUCTIONS
Qty: 6 TABLET | Refills: 0 | Status: SHIPPED | OUTPATIENT
Start: 2022-01-04 | End: 2022-01-09

## 2022-02-19 DIAGNOSIS — Z96.642 HISTORY OF HEMIARTHROPLASTY OF LEFT HIP: ICD-10-CM

## 2022-02-19 DIAGNOSIS — F51.01 PRIMARY INSOMNIA: ICD-10-CM

## 2022-02-19 DIAGNOSIS — Z96.642 STATUS POST TOTAL REPLACEMENT OF LEFT HIP: ICD-10-CM

## 2022-02-21 RX ORDER — ALPRAZOLAM 1 MG/1
TABLET ORAL
Qty: 30 TABLET | Refills: 0 | Status: SHIPPED | OUTPATIENT
Start: 2022-02-21 | End: 2022-05-19

## 2022-02-21 RX ORDER — MELOXICAM 15 MG/1
TABLET ORAL
Qty: 90 TABLET | Refills: 0 | Status: SHIPPED | OUTPATIENT
Start: 2022-02-21 | End: 2022-05-19

## 2022-02-21 NOTE — TELEPHONE ENCOUNTER
Last Office Visit  -  9/13/21  Next Office Visit  -      Last Filled  -  11/2/21  Last UDS -    Contract -

## 2022-03-02 ENCOUNTER — TELEPHONE (OUTPATIENT)
Dept: FAMILY MEDICINE CLINIC | Age: 77
End: 2022-03-02

## 2022-03-02 DIAGNOSIS — G20 PARKINSON DISEASE (HCC): Primary | ICD-10-CM

## 2022-03-07 NOTE — TELEPHONE ENCOUNTER
Called [facility] and they have Joanna Pelletier that is a movement specialist. When I informed the pt she stated that she went to her before and their personalities clashed. Do you have any other suggestions?

## 2022-03-07 NOTE — TELEPHONE ENCOUNTER
Have patient call [facility] and see if they have a movement speicalist that is close enough.

## 2022-03-07 NOTE — TELEPHONE ENCOUNTER
Patient said [facility] does not specialize in movement disorders and  [facility] is too far. Can you recommend  someone else?   Please call [phone]

## 2022-03-16 RX ORDER — AMLODIPINE BESYLATE 5 MG/1
10 TABLET ORAL NIGHTLY
Qty: 180 TABLET | Refills: 2 | Status: SHIPPED | OUTPATIENT
Start: 2022-03-16 | End: 2022-06-27

## 2022-03-16 NOTE — TELEPHONE ENCOUNTER
Last Office Visit  -  9/13/21  Next Office Visit  -  n/a    Last Filled  -    Last UDS -    Contract -

## 2022-05-17 ENCOUNTER — OFFICE VISIT (OUTPATIENT)
Dept: FAMILY MEDICINE CLINIC | Age: 77
End: 2022-05-17
Payer: MEDICARE

## 2022-05-17 VITALS
HEIGHT: 62 IN | SYSTOLIC BLOOD PRESSURE: 134 MMHG | OXYGEN SATURATION: 99 % | HEART RATE: 69 BPM | WEIGHT: 122 LBS | BODY MASS INDEX: 22.45 KG/M2 | DIASTOLIC BLOOD PRESSURE: 72 MMHG

## 2022-05-17 DIAGNOSIS — R53.83 FATIGUE, UNSPECIFIED TYPE: ICD-10-CM

## 2022-05-17 DIAGNOSIS — M81.0 OSTEOPOROSIS, UNSPECIFIED OSTEOPOROSIS TYPE, UNSPECIFIED PATHOLOGICAL FRACTURE PRESENCE: Primary | ICD-10-CM

## 2022-05-17 LAB
A/G RATIO: 1 (ref 1.1–2.2)
ALBUMIN SERPL-MCNC: 4.2 G/DL (ref 3.4–5)
ALP BLD-CCNC: 62 U/L (ref 40–129)
ALT SERPL-CCNC: 8 U/L (ref 10–40)
ANION GAP SERPL CALCULATED.3IONS-SCNC: 11 MMOL/L (ref 3–16)
AST SERPL-CCNC: 20 U/L (ref 15–37)
BASOPHILS ABSOLUTE: 0 K/UL (ref 0–0.2)
BASOPHILS RELATIVE PERCENT: 0.2 %
BILIRUB SERPL-MCNC: 0.5 MG/DL (ref 0–1)
BUN BLDV-MCNC: 18 MG/DL (ref 7–20)
CALCIUM SERPL-MCNC: 10.4 MG/DL (ref 8.3–10.6)
CHLORIDE BLD-SCNC: 100 MMOL/L (ref 99–110)
CO2: 27 MMOL/L (ref 21–32)
CREAT SERPL-MCNC: 0.8 MG/DL (ref 0.6–1.2)
EOSINOPHILS ABSOLUTE: 0 K/UL (ref 0–0.6)
EOSINOPHILS RELATIVE PERCENT: 0.6 %
GFR AFRICAN AMERICAN: >60
GFR NON-AFRICAN AMERICAN: >60
GLUCOSE BLD-MCNC: 109 MG/DL (ref 70–99)
HCT VFR BLD CALC: 36.9 % (ref 36–48)
HEMOGLOBIN: 12.6 G/DL (ref 12–16)
LYMPHOCYTES ABSOLUTE: 1.1 K/UL (ref 1–5.1)
LYMPHOCYTES RELATIVE PERCENT: 29 %
MCH RBC QN AUTO: 33.5 PG (ref 26–34)
MCHC RBC AUTO-ENTMCNC: 34.1 G/DL (ref 31–36)
MCV RBC AUTO: 98.5 FL (ref 80–100)
MONOCYTES ABSOLUTE: 0.5 K/UL (ref 0–1.3)
MONOCYTES RELATIVE PERCENT: 11.6 %
NEUTROPHILS ABSOLUTE: 2.3 K/UL (ref 1.7–7.7)
NEUTROPHILS RELATIVE PERCENT: 58.6 %
PDW BLD-RTO: 13 % (ref 12.4–15.4)
PLATELET # BLD: 183 K/UL (ref 135–450)
PMV BLD AUTO: 7.5 FL (ref 5–10.5)
POTASSIUM SERPL-SCNC: 4.4 MMOL/L (ref 3.5–5.1)
RBC # BLD: 3.75 M/UL (ref 4–5.2)
SODIUM BLD-SCNC: 138 MMOL/L (ref 136–145)
TOTAL PROTEIN: 8.3 G/DL (ref 6.4–8.2)
TSH REFLEX: 2.72 UIU/ML (ref 0.27–4.2)
WBC # BLD: 3.9 K/UL (ref 4–11)

## 2022-05-17 PROCEDURE — 1123F ACP DISCUSS/DSCN MKR DOCD: CPT | Performed by: FAMILY MEDICINE

## 2022-05-17 PROCEDURE — 99214 OFFICE O/P EST MOD 30 MIN: CPT | Performed by: FAMILY MEDICINE

## 2022-05-17 NOTE — PROGRESS NOTES
Angélica Negron (:  1945) is a 68 y.o. female,Established patient, here for evaluation of the following chief complaint(s):  Medication Check         ASSESSMENT/PLAN:  1. Osteoporosis, unspecified osteoporosis type, unspecified pathological fracture presence  -     DEXA BONE DENSITY 2 SITES; Future  2. Fatigue, unspecified type  -     TSH with Reflex; Future  -     CBC with Auto Differential; Future  -     Comprehensive Metabolic Panel; Future      No follow-ups on file. Subjective   SUBJECTIVE/OBJECTIVE:  Angélica Negron is a 68 y.o. female. Patient presents with:  Medication Check      Tired, headache, foggy. BP is elevated in am.  Feel sick in morning. Feeling more fatigued all the time. Denies shortness of breath or headaches. Does have a history of osteoporosis and has been on treatment for this and is due for DEXA scan. Patient has been sleeping well. Denies symptoms of depression. Has had no other significant complaints at this time. She is maintaining weight has no heat or cold intolerance. The patients PMH, surgical history, family history, medications, allergies were all reviewed and updated as appropriate today. Review of Systems       Objective   Physical Exam  Vitals and nursing note reviewed. Constitutional:       Appearance: Normal appearance. She is well-developed. HENT:      Head: Normocephalic and atraumatic. Right Ear: External ear normal.      Left Ear: External ear normal.      Nose: Nose normal.   Eyes:      Conjunctiva/sclera: Conjunctivae normal.      Pupils: Pupils are equal, round, and reactive to light. Cardiovascular:      Rate and Rhythm: Normal rate and regular rhythm. Heart sounds: Normal heart sounds. Pulmonary:      Effort: Pulmonary effort is normal.      Breath sounds: Normal breath sounds. Abdominal:      General: Bowel sounds are normal.      Palpations: Abdomen is soft.    Musculoskeletal:         General: Normal range of motion. Cervical back: Normal range of motion and neck supple. Skin:     General: Skin is dry. Neurological:      Mental Status: She is alert and oriented to person, place, and time. Deep Tendon Reflexes: Reflexes are normal and symmetric. Psychiatric:         Behavior: Behavior normal.         Thought Content: Thought content normal.         Judgment: Judgment normal.            On this date 5/17/2022 I have spent 30 minutes reviewing previous notes, test results and face to face with the patient discussing the diagnosis and importance of compliance with the treatment plan as well as documenting on the day of the visit. An electronic signature was used to authenticate this note.     --Angel Mendosa MD

## 2022-05-19 DIAGNOSIS — Z96.642 STATUS POST TOTAL REPLACEMENT OF LEFT HIP: ICD-10-CM

## 2022-05-19 DIAGNOSIS — R07.81 RIB PAIN: ICD-10-CM

## 2022-05-19 DIAGNOSIS — Z96.642 HISTORY OF HEMIARTHROPLASTY OF LEFT HIP: ICD-10-CM

## 2022-05-19 DIAGNOSIS — F51.01 PRIMARY INSOMNIA: ICD-10-CM

## 2022-05-19 RX ORDER — LOSARTAN POTASSIUM 50 MG/1
TABLET ORAL
Qty: 90 TABLET | Refills: 0 | Status: SHIPPED | OUTPATIENT
Start: 2022-05-19 | End: 2022-06-27

## 2022-05-19 RX ORDER — ISOSORBIDE MONONITRATE 30 MG/1
TABLET, EXTENDED RELEASE ORAL
Qty: 90 TABLET | Refills: 0 | Status: SHIPPED | OUTPATIENT
Start: 2022-05-19 | End: 2022-08-15

## 2022-05-19 RX ORDER — TRAMADOL HYDROCHLORIDE 50 MG/1
TABLET ORAL
Qty: 28 TABLET | Refills: 0 | Status: SHIPPED | OUTPATIENT
Start: 2022-05-19 | End: 2022-06-20

## 2022-05-19 RX ORDER — ALPRAZOLAM 1 MG/1
TABLET ORAL
Qty: 30 TABLET | Refills: 0 | Status: SHIPPED | OUTPATIENT
Start: 2022-05-19 | End: 2022-06-20

## 2022-05-19 RX ORDER — MELOXICAM 15 MG/1
TABLET ORAL
Qty: 90 TABLET | Refills: 0 | Status: SHIPPED | OUTPATIENT
Start: 2022-05-19

## 2022-05-19 NOTE — TELEPHONE ENCOUNTER
Last Office Visit  -  5/17/22  Next Office Visit  -  n/a    Last Filled  -  2/21/22  Last UDS -  n/a  Contract -  N/a

## 2022-05-19 NOTE — TELEPHONE ENCOUNTER
Last Office Visit  -  5/17/22  Next Office Visit  -  n/a     Last Filled  - 9/20/21  Last UDS -  n/a  Contract -  N/a

## 2022-06-02 ENCOUNTER — HOSPITAL ENCOUNTER (OUTPATIENT)
Dept: WOMENS IMAGING | Age: 77
Discharge: HOME | End: 2022-06-02
Payer: MEDICARE

## 2022-06-02 DIAGNOSIS — M81.0 OSTEOPOROSIS, UNSPECIFIED OSTEOPOROSIS TYPE, UNSPECIFIED PATHOLOGICAL FRACTURE PRESENCE: ICD-10-CM

## 2022-06-02 PROCEDURE — 77080 DXA BONE DENSITY AXIAL: CPT

## 2022-06-18 DIAGNOSIS — M85.80 OSTEOPENIA, UNSPECIFIED LOCATION: ICD-10-CM

## 2022-06-18 DIAGNOSIS — R07.81 RIB PAIN: ICD-10-CM

## 2022-06-18 DIAGNOSIS — F51.01 PRIMARY INSOMNIA: ICD-10-CM

## 2022-06-20 RX ORDER — ALPRAZOLAM 1 MG/1
TABLET ORAL
Qty: 30 TABLET | Refills: 0 | Status: SHIPPED | OUTPATIENT
Start: 2022-06-20 | End: 2022-07-20

## 2022-06-20 RX ORDER — ALENDRONATE SODIUM 70 MG/1
TABLET ORAL
Qty: 12 TABLET | Refills: 0 | Status: SHIPPED | OUTPATIENT
Start: 2022-06-20 | End: 2022-08-15

## 2022-06-20 RX ORDER — TRAMADOL HYDROCHLORIDE 50 MG/1
TABLET ORAL
Qty: 28 TABLET | Refills: 0 | Status: SHIPPED | OUTPATIENT
Start: 2022-06-20 | End: 2022-08-15 | Stop reason: SDUPTHER

## 2022-06-20 NOTE — TELEPHONE ENCOUNTER
Last Office Visit  -  5/17/22  Next Office Visit  -  n/a    Last Filled  -  5/19/22  Last UDS -  n/a  Contract -  N/a

## 2022-06-23 NOTE — PROGRESS NOTES
[id number] [facility] Heart Consultation Note  2022   Ref by Dr Marilyn Fernandez:  Ms. Lali Gutierrez is here for evaluation of chest pain. Her last visit with me was 2017. [place]:  Today, she reports that she walks about 1/2 mile twice a day. She notes that she has right-sided chest pain with it. It is under her right breast and  Right upper arm. It is a painful ache. She gets short of breath. She does not stop walking until she gets home. It goes away with rest.  She is short of breath with exertion. She monitors her BP at home and she brought log for review. It fluctuates from lying, sitting and standing. In the morning when lying 156/88 and then standing 110/69. She notes that she is dizzy and has to walk with her cane. She has a headache and does not feel well when her BP is high or too low. This has been going on for a couple of months. EKG today SR 62 bpm.It is in normal limits. She states that she is tired a lot. Weight today 120# weight 2016 123#. PMH  CAD, HTN and HLD, Parkinson's and Sjogren's disease  Negative stress test 2016. She underwent LHC 16 which revealed  Moderate disease of proximal circumflex and ostial small first diagonal branch. ejection fraction of 75%. Review of Systems:  12 point ROS negative in all areas as listed below except as in [place]  Constitutional, EENT, Cardiovascular, pulmonary, GI, , Musculoskeletal, skin, neurological, hematological, endocrine, Psychiatric      Reviewed past medical history, social, and family history.  Nonsmoker no alcohol or drug abuse  Mom alzheimers disease  Dad heart disease  of MI at 52  Past Medical History:   Diagnosis Date    Allergic rhinitis     Arthritis     CAD (coronary artery disease)     Fractures     Hyperlipidemia     Hypertension     Migraine headache     since 's    Parkinson disease ([address]     Reactive airway disease 2008    Shingles 10/2019    GROIN LEFT    Sjogren's syndrome ([facility] [address] 2012     [place]     Past Surgical History:   Procedure Laterality Date    ARTHROGRAPHY Left 01/08/2020    LEFT HIP ASPIRATION WITH SYNOVASURE  CPT CODE - 78418 performed by Christy Mcintyre MD at [facility] WITH IMPLANT Left 09/08/2021    COLONOSCOPY  09/15/2010    Dr. Delmy Ellis  12/22/2016    DENTAL SURGERY  12/04/2019    TEETH EXTRACTION    Francisco Adolfo CONTRACTURE SURGERY Left 06/17/2019    LEFT DEQUERVAIN'S RELEASE performed by Honorio Hunt MD at [address] / FINGER Left 06/17/2019    EXCISION LEFT WRIST GANGLION performed by Honorio Hunt MD at [address] Left     broke the femur    HIP SURGERY      JOINT REPLACEMENT Left 04/2018    HIP    REVISION TOTAL HIP ARTHROPLASTY Left 01/29/2020    REVISION LEFT HEMIARTHROPLASTY TO LATERAL TOTAL HIP ARTHROPLASTY WITH CELLSAVER AND FASCIAILIACA BLOCK FOR PAIN CONTROL     MONTANA & NEPHEW  CPT CODE - 53273 performed by Christy Mcintyre MD at [facility]  07/2015    TUBAL LIGATION  1972       Objective:   BP (!) 174/80   Pulse 62   Ht 5' 2\" (1.575 m)   Wt 120 lb (54.4 kg)   BMI 21.95 kg/m²     Wt Readings from Last 3 Encounters:   06/27/22 120 lb (54.4 kg)   05/17/22 122 lb (55.3 kg)   09/13/21 125 lb (56.7 kg)       Physical Exam:  General: No Respiratory distress, appears well developed and well nourished. Eyes:  Sclera nonicteric  Nose/Sinuses:  negative findings: nose shows no deformity, asymmetry, or inflammation, nasal mucosa normal, septum midline with no perforation or bleeding  Back:  no pain to palpation  Joint:  no active joint inflammation  Musculoskeletal:  negative  Skin:  Warm and dry  Neck:  Negative for JVD and Carotid Bruits. Chest:  Clear to auscultation, respiration easy  Cardiovascular:  RRR, S1S2 normal, no murmur, no rub or thrill.   Abdomen:  Soft normal liver and spleen  Extremities:   No edema, clubbing, cyanosis,  Pulses:  pedal pulses are normal.  Neuro: intact    Medications:   Outpatient Encounter Medications as of 6/27/2022   Medication Sig Dispense Refill    acetaminophen (TYLENOL) 650 MG extended release tablet Take 650 mg by mouth every 8 hours as needed for Pain      alendronate (FOSAMAX) 70 MG tablet Take 1 tablet by mouth once a week 12 tablet 0    ALPRAZolam (XANAX) 1 MG tablet TAKE 1/2 (ONE-HALF) TABLET BY MOUTH NIGHTLY AS NEEDED FOR SLEEP FOR  UP  TO  60  DAYS  (MUST  MAKE  APPOINTMENT  AS  SOON  AS  POSSIBLE) 30 tablet 0    traMADol (ULTRAM) 50 MG tablet TAKE 1 TABLET BY MOUTH EVERY 6 HOURS AS NEEDED FOR PAIN FOR  UP  TO  7  DAYS  **TAKE  LOWEST  DOSE  POSSIBLE  TO  MANAGE  PAIN** 28 tablet 0    losartan (COZAAR) 50 MG tablet Take 1 tablet by mouth once daily 90 tablet 0    meloxicam (MOBIC) 15 MG tablet TAKE 1 TABLET BY MOUTH ONCE DAILY AS NEEDED FOR PAIN 90 tablet 0    isosorbide mononitrate (IMDUR) 30 MG extended release tablet TAKE 1 TABLET BY MOUTH ONCE DAILY. APPOINTMENT REQUIRED FOR FUTURE REFILLS.  90 tablet 0    amLODIPine (NORVASC) 5 MG tablet TAKE 2 TABLETS BY MOUTH NIGHTLY 180 tablet 2    montelukast (SINGULAIR) 10 MG tablet Take 1 tablet by mouth nightly 90 tablet 3    lovastatin (MEVACOR) 20 MG tablet Take 1 tablet by mouth nightly 90 tablet 3    carbidopa-levodopa (SINEMET)  MG per tablet Take 1 tablet by mouth 4 times daily 360 tablet 3    metoprolol tartrate (LOPRESSOR) 25 MG tablet Take 1 tablet by mouth twice daily 60 tablet 11    carboxymethylcellulose 1 % ophthalmic solution Place 1 drop into both eyes 4 times daily      Calcium Carbonate-Vitamin D (OYSTER SHELL CALCIUM/D) 500-200 MG-UNIT TABS TAKE 1 TABLET BY MOUTH ONCE DAILY 30 tablet 0    nitroGLYCERIN (NITROSTAT) 0.4 MG SL tablet Place 1 tablet under the tongue every 5 minutes as needed for Chest pain 25 tablet 3    Multiple Vitamins-Minerals (THERAPEUTIC MULTIVITAMIN-MINERALS) tablet Take 1 tablet by mouth daily      mometasone (NASONEX) 50 MCG/ACT nasal spray 2 sprays by Nasal route daily. Each nostril 3 g 3    guaiFENesin (MUCINEX) 600 MG extended release tablet Take 1,200 mg by mouth as needed  (Patient not taking: Reported on 6/28/2021)       No facility-administered encounter medications on file as of 6/27/2022. Lab Data:  Lab Results   Component Value Date     05/17/2022    K 4.4 05/17/2022     05/17/2022    CO2 27 05/17/2022    BUN 18 05/17/2022    CREATININE 0.8 05/17/2022    GLUCOSE 109 (H) 05/17/2022    CALCIUM 10.4 05/17/2022    PROT 8.3 (H) 05/17/2022    LABALBU 4.2 05/17/2022    BILITOT 0.5 05/17/2022    ALKPHOS 62 05/17/2022    AST 20 05/17/2022    ALT 8 (L) 05/17/2022    LABGLOM >60 05/17/2022    GFRAA >60 05/17/2022    AGRATIO 1.0 (L) 05/17/2022    GLOB 4.1 08/18/2020     Lab Results   Component Value Date    WBC 3.9 (L) 05/17/2022    HGB 12.6 05/17/2022    HCT 36.9 05/17/2022    MCV 98.5 05/17/2022     05/17/2022     Lab Results   Component Value Date    TSH 2.37 08/18/2020    T4FREE 0.9 08/18/2020     Lab Results   Component Value Date    LABA1C 5.3 01/07/2020     LIPID:   Lab Results   Component Value Date    CHOL 195 01/22/2019    CHOL 158 05/25/2018    CHOL 167 12/22/2016     Lab Results   Component Value Date    TRIG 131 01/22/2019    TRIG 207 (H) 05/25/2018    TRIG 139 12/22/2016     Lab Results   Component Value Date    HDL 45 01/22/2019    HDL 43 05/25/2018    HDL 47 07/28/2017     Lab Results   Component Value Date    LDLCALC 124 (H) 01/22/2019    LDLCALC 74 05/25/2018    LDLCALC 102 (H) 07/28/2017     Lab Results   Component Value Date    LABVLDL 26 01/22/2019    LABVLDL 41 05/25/2018    LABVLDL 22 07/28/2017     No results found for: CHOLHDLRATIO  PT/INR: No results for input(s): PROTIME, INR in the last 72 hours.   A1C:   Lab Results   Component Value Date    LABA1C 5.3 01/07/2020     BNP:  No results for input(s): BNP in the last 72 hours.  Imaging  EKG 6.27.22  Normal EKG   EKG 6/28/21  Sinus  Rhythm   WITHIN NORMAL LIMITS 88 bpm    [facility] 12/23/16  CORONARY ANGIOGRAPHY:  1. The left main was a large caliber vessel that bifurcated. It was patent.     2. The left circumflex was a medium caliber vessel that was dominant. After it  gave off a large first obtuse marginal branch, there was 60% discrete stenosis  in the circumflex. The first obtuse marginal branch was large and free of  significant disease. In the posterior AV groove, the circumflex gave off 2  small to medium caliber left posterolateral branches and a small left  posterior descending artery, which were all normal.     3. The LAD was a medium caliber vessel that reached the apex. The mid-LAD had  20% diffuse stenosis. It gave off a small diagonal branch that had 60% ostial  stenosis.      4. The right coronary artery was small and non-dominant and widely patent.    IMPRESSION:   1. Moderate disease of proximal circumflex and ostial small first diagonal branch. 2. Hyperdynamic left ventricle with ejection fraction of 75%. 3. Normal left ventricular filling pressure. ECHO 12/13/16  Summary  Left ventricular systolic function is normal with an ejection fraction of 55-60%. No wall motion abnormalities noted. Normal left ventricular size and wall thickness. Diastolic filling parameters suggests grade I diastolic dysfunction. Trivial mitral and tricuspid regurgitation. Systolic pulmonary artery pressure (SPAP) is normal and estimated at 32 mmHg (RA pressure 3 mm Hg). CXR 12/13/16  FINDINGS: The lungs are without acute focal process. Scattered calcified granulomas are again seen the lungs. There is no effusion or pneumothorax. The cardiomediastinal silhouette is without acute process. The osseous structures are without acute process. There has been no significant interval change compared to prior.     Myoview stress test:  07/19/2016  Normal myocardial perfusion study with normal left ventricular function,    size, and wall motion.    The estimated left ventricular function is 76%. Assessment:  Osman Reyes was seen today for establish cardiologist, chest pain and shortness of breath. Diagnoses and all orders for this visit:  Exertional chest pain consistent with new onset of angina pectoris,  Essential hypertension: labile orthostatic could be autonomic dysfunction part of Parkinsons' disease    Hyperlipidemia: reviewed in epic  Parkinsons disease    Plan:  1. Medications reviewed  2. BP log reviewed  3. I will order an lexiscan myoview test to assess myocardial perfusion and LV function. She cannot walk on treadmill uses cane and has parkinsonian tremor  4. Recommend that you sleep with head elevated to avoid supine high blood pressure  5. Recommend knee High compression socks to help with orthostatic hypotension  6. Decrease losartan to 25 mg daily. We are calling in 25 mg tablets. 7. Start HCTZ 25 mg: Take one-half on Mon, Wed and Friday  8. Stay hydrated with 8 cups of liquid daily  9 continue amlodipine at bed time daughter who is RN will monitor BP and let me if any changes needed before next office visit. Your provider has ordered testing for further evaluation. An order/prescription has been included in your paper work.  To schedule outpatient testing, contact Central Scheduling by calling [employer] ([phone]). Plan to follow up in 4 weeks and bring BP log with you    QUALITY MEASURES  1. Tobacco Cessation Counseling: NA  2. Retake of BP if >140/90:   NA  3. Documentation to PCP/referring for new patient:  Sent to PCP at close of office visit  4. CAD patient on anti-platelet: Yes  5. CAD patient on STATIN therapy:  Yes  6. Patient with CHF and aFib on anticoagulation:  NA       This note was scribed in the presence of Parminder Brush MD by Oralia Luna RN.      I, Dr. Mouna Wolf, personally performed the services described in this documentation, as scribed by the above signed scribe in my presence. It is both accurate and complete to my knowledge. I agree with the details independently gathered by the clinical support staff, while the remaining scribed note accurately describes my personal service to the patient.         Lauryn Villegas MD  [facility]  [phone] [place] office  [phone] [facility]

## 2022-06-27 ENCOUNTER — OFFICE VISIT (OUTPATIENT)
Dept: CARDIOLOGY CLINIC | Age: 77
End: 2022-06-27
Payer: MEDICARE

## 2022-06-27 VITALS
SYSTOLIC BLOOD PRESSURE: 174 MMHG | HEART RATE: 62 BPM | BODY MASS INDEX: 22.08 KG/M2 | WEIGHT: 120 LBS | HEIGHT: 62 IN | DIASTOLIC BLOOD PRESSURE: 80 MMHG

## 2022-06-27 DIAGNOSIS — E78.5 HYPERLIPIDEMIA, UNSPECIFIED HYPERLIPIDEMIA TYPE: ICD-10-CM

## 2022-06-27 DIAGNOSIS — I25.710 CORONARY ARTERY DISEASE INVOLVING AUTOLOGOUS VEIN CORONARY BYPASS GRAFT WITH UNSTABLE ANGINA PECTORIS (HCC): Primary | ICD-10-CM

## 2022-06-27 DIAGNOSIS — I10 PRIMARY HYPERTENSION: ICD-10-CM

## 2022-06-27 DIAGNOSIS — I95.1 ORTHOSTATIC HYPOTENSION: ICD-10-CM

## 2022-06-27 DIAGNOSIS — R07.9 CHEST PAIN, UNSPECIFIED TYPE: ICD-10-CM

## 2022-06-27 PROCEDURE — 99204 OFFICE O/P NEW MOD 45 MIN: CPT | Performed by: INTERNAL MEDICINE

## 2022-06-27 PROCEDURE — 93000 ELECTROCARDIOGRAM COMPLETE: CPT | Performed by: INTERNAL MEDICINE

## 2022-06-27 RX ORDER — ASPIRIN 81 MG/1
81 TABLET ORAL DAILY
Qty: 90 TABLET | Refills: 1 | Status: SHIPPED | OUTPATIENT
Start: 2022-06-27

## 2022-06-27 RX ORDER — HYDROCHLOROTHIAZIDE 25 MG/1
TABLET ORAL
Qty: 36 TABLET | Refills: 3 | Status: SHIPPED | OUTPATIENT
Start: 2022-06-27

## 2022-06-27 RX ORDER — LOSARTAN POTASSIUM 25 MG/1
25 TABLET ORAL DAILY
Qty: 90 TABLET | Refills: 3 | Status: SHIPPED | OUTPATIENT
Start: 2022-06-27

## 2022-06-27 RX ORDER — SENNOSIDES 8.6 MG
650 CAPSULE ORAL EVERY 8 HOURS PRN
COMMUNITY

## 2022-06-27 RX ORDER — AMLODIPINE BESYLATE 5 MG/1
5 TABLET ORAL NIGHTLY
Qty: 90 TABLET | Refills: 3
Start: 2022-06-27

## 2022-06-27 NOTE — PATIENT INSTRUCTIONS
Plan:  1. Medications reviewed  2. BP log reviewed  3. I will order an lexiscan myoview test to assess myocardial perfusion and LV function. 4. Recommend that you sleep with head elevated  5. Recommend knee High compression socks to help with BP  6. Decrease losartan to 25 mg daily. We are calling in 25 mg tablets. 7. Start HCTZ 25 mg: Take one-half on Mon, Wed and Friday  8. Stay hydrated with 8 cups of liquid daily    Your provider has ordered testing for further evaluation. An order/prescription has been included in your paper work.  To schedule outpatient testing, contact Central Scheduling by calling [phone] ([phone]).     Plan to follow up in 4 weeks and bring BP log with you

## 2022-07-06 ENCOUNTER — HOSPITAL ENCOUNTER (OUTPATIENT)
Dept: NUCLEAR MEDICINE | Age: 77
Discharge: HOME | End: 2022-07-06
Payer: MEDICARE

## 2022-07-06 ENCOUNTER — HOSPITAL ENCOUNTER (OUTPATIENT)
Dept: NON INVASIVE DIAGNOSTICS | Age: 77
Discharge: HOME | End: 2022-07-06
Payer: MEDICARE

## 2022-07-06 DIAGNOSIS — I25.710 CORONARY ARTERY DISEASE INVOLVING AUTOLOGOUS VEIN CORONARY BYPASS GRAFT WITH UNSTABLE ANGINA PECTORIS (HCC): ICD-10-CM

## 2022-07-06 DIAGNOSIS — I10 PRIMARY HYPERTENSION: ICD-10-CM

## 2022-07-06 DIAGNOSIS — E78.5 HYPERLIPIDEMIA, UNSPECIFIED HYPERLIPIDEMIA TYPE: ICD-10-CM

## 2022-07-06 DIAGNOSIS — R07.9 CHEST PAIN, UNSPECIFIED TYPE: ICD-10-CM

## 2022-07-06 LAB
LV EF: 70 %
LVEF MODALITY: NORMAL

## 2022-07-06 PROCEDURE — 78452 HT MUSCLE IMAGE SPECT MULT: CPT

## 2022-07-06 PROCEDURE — 6360000002 HC RX W HCPCS: Performed by: INTERNAL MEDICINE

## 2022-07-06 PROCEDURE — 93017 CV STRESS TEST TRACING ONLY: CPT

## 2022-07-06 PROCEDURE — 3430000000 HC RX DIAGNOSTIC RADIOPHARMACEUTICAL: Performed by: INTERNAL MEDICINE

## 2022-07-06 PROCEDURE — A9502 TC99M TETROFOSMIN: HCPCS | Performed by: INTERNAL MEDICINE

## 2022-07-06 RX ORDER — AMINOPHYLLINE DIHYDRATE 25 MG/ML
50 INJECTION, SOLUTION INTRAVENOUS ONCE
Status: COMPLETED | OUTPATIENT
Start: 2022-07-06 | End: 2022-07-06

## 2022-07-06 RX ADMIN — TETROFOSMIN 32.2 MILLICURIE: 1.38 INJECTION, POWDER, LYOPHILIZED, FOR SOLUTION INTRAVENOUS at 10:50

## 2022-07-06 RX ADMIN — REGADENOSON 0.4 MG: 0.08 INJECTION, SOLUTION INTRAVENOUS at 10:50

## 2022-07-06 RX ADMIN — AMINOPHYLLINE 50 MG: 25 INJECTION, SOLUTION INTRAVENOUS at 10:59

## 2022-07-06 RX ADMIN — TETROFOSMIN 10.2 MILLICURIE: 1.38 INJECTION, POWDER, LYOPHILIZED, FOR SOLUTION INTRAVENOUS at 09:48

## 2022-07-07 RX ORDER — RANOLAZINE 500 MG/1
500 TABLET, EXTENDED RELEASE ORAL 2 TIMES DAILY
Qty: 60 TABLET | Refills: 3 | Status: SHIPPED | OUTPATIENT
Start: 2022-07-07 | End: 2022-09-01

## 2022-07-07 NOTE — PROGRESS NOTES
Stress test no ischemia but patient has angina  I am sending a prescription for Ranexa 500mg BID   Left a message for patient.

## 2022-07-11 ENCOUNTER — TELEPHONE (OUTPATIENT)
Dept: CARDIOLOGY CLINIC | Age: 77
End: 2022-07-11

## 2022-07-13 NOTE — TELEPHONE ENCOUNTER
[facility] pharmacy calling in about this PA. Please advise. Ranexa, 500mg ER tabs. QID  Please advise & thank you.      FP:[phone]  ENC:[phone]

## 2022-07-19 DIAGNOSIS — I10 PRIMARY HYPERTENSION: Primary | ICD-10-CM

## 2022-08-15 DIAGNOSIS — M85.80 OSTEOPENIA, UNSPECIFIED LOCATION: ICD-10-CM

## 2022-08-15 DIAGNOSIS — R07.81 RIB PAIN: ICD-10-CM

## 2022-08-15 RX ORDER — ALENDRONATE SODIUM 70 MG/1
TABLET ORAL
Qty: 12 TABLET | Refills: 0 | Status: SHIPPED | OUTPATIENT
Start: 2022-08-15

## 2022-08-15 RX ORDER — TRAMADOL HYDROCHLORIDE 50 MG/1
50 TABLET ORAL DAILY PRN
Qty: 28 TABLET | Refills: 0 | Status: SHIPPED | OUTPATIENT
Start: 2022-08-15 | End: 2022-09-14

## 2022-08-15 RX ORDER — LOSARTAN POTASSIUM 50 MG/1
TABLET ORAL
Qty: 90 TABLET | Refills: 0 | Status: SHIPPED | OUTPATIENT
Start: 2022-08-15 | End: 2022-09-01

## 2022-08-15 RX ORDER — ISOSORBIDE MONONITRATE 30 MG/1
TABLET, EXTENDED RELEASE ORAL
Qty: 90 TABLET | Refills: 0 | Status: SHIPPED | OUTPATIENT
Start: 2022-08-15 | End: 2022-09-01 | Stop reason: SDUPTHER

## 2022-08-15 NOTE — TELEPHONE ENCOUNTER
Last Office Visit  -  5/17/22  Next Office Visit  -  n/a    Last Filled  -  6/20/22 #28  Last UDS -  n/a  Contract -  n/a

## 2022-08-15 NOTE — TELEPHONE ENCOUNTER
[place] [facility] [facility] also req a refill of   Tramadol 50mg tabs  Last visit 5/17/22  No future  Rohit Naik.

## 2022-08-17 DIAGNOSIS — I10 PRIMARY HYPERTENSION: ICD-10-CM

## 2022-08-17 LAB
ANION GAP SERPL CALCULATED.3IONS-SCNC: 6 MMOL/L (ref 3–16)
BUN BLDV-MCNC: 12 MG/DL (ref 7–20)
CALCIUM SERPL-MCNC: 9.2 MG/DL (ref 8.3–10.6)
CHLORIDE BLD-SCNC: 99 MMOL/L (ref 99–110)
CO2: 30 MMOL/L (ref 21–32)
CREAT SERPL-MCNC: 0.7 MG/DL (ref 0.6–1.2)
GFR AFRICAN AMERICAN: >60
GFR NON-AFRICAN AMERICAN: >60
GLUCOSE BLD-MCNC: 98 MG/DL (ref 70–99)
POTASSIUM SERPL-SCNC: 5 MMOL/L (ref 3.5–5.1)
SODIUM BLD-SCNC: 135 MMOL/L (ref 136–145)

## 2022-08-19 ENCOUNTER — TELEPHONE (OUTPATIENT)
Dept: CARDIOLOGY CLINIC | Age: 77
End: 2022-08-19

## 2022-08-19 NOTE — TELEPHONE ENCOUNTER
Called and spoke with patient. Informed her of Dr Brittany Aguilera result. Patient verbally understood.

## 2022-08-19 NOTE — TELEPHONE ENCOUNTER
----- Message from Alex Marcos MD sent at 8/19/2022  5:56 AM EDT -----  Blood test  sodium 135 slightly below normal range 136-145 not worrisome recommend recheck in 6 months

## 2022-09-01 ENCOUNTER — OFFICE VISIT (OUTPATIENT)
Dept: CARDIOLOGY CLINIC | Age: 77
End: 2022-09-01
Payer: MEDICARE

## 2022-09-01 VITALS
WEIGHT: 120.8 LBS | OXYGEN SATURATION: 97 % | SYSTOLIC BLOOD PRESSURE: 118 MMHG | HEIGHT: 62 IN | TEMPERATURE: 98.2 F | BODY MASS INDEX: 22.23 KG/M2 | DIASTOLIC BLOOD PRESSURE: 68 MMHG | HEART RATE: 67 BPM

## 2022-09-01 DIAGNOSIS — I10 PRIMARY HYPERTENSION: ICD-10-CM

## 2022-09-01 DIAGNOSIS — I25.10 CORONARY ARTERY DISEASE INVOLVING NATIVE CORONARY ARTERY OF NATIVE HEART WITHOUT ANGINA PECTORIS: Primary | ICD-10-CM

## 2022-09-01 DIAGNOSIS — E78.2 MIXED HYPERLIPIDEMIA: ICD-10-CM

## 2022-09-01 PROBLEM — I25.110 CORONARY ARTERY DISEASE INVOLVING NATIVE CORONARY ARTERY OF NATIVE HEART WITH UNSTABLE ANGINA PECTORIS (HCC): Status: ACTIVE | Noted: 2022-09-01

## 2022-09-01 PROCEDURE — 99214 OFFICE O/P EST MOD 30 MIN: CPT | Performed by: INTERNAL MEDICINE

## 2022-09-01 PROCEDURE — 1123F ACP DISCUSS/DSCN MKR DOCD: CPT | Performed by: INTERNAL MEDICINE

## 2022-09-01 RX ORDER — ISOSORBIDE MONONITRATE 30 MG/1
TABLET, EXTENDED RELEASE ORAL
Qty: 90 TABLET | Refills: 3 | Status: SHIPPED | OUTPATIENT
Start: 2022-09-01

## 2022-09-01 NOTE — LETTER
[address] [facility]  [address] [address]  Phone: [phone]  Fax: [phone]    Concepcion Nelson MD    September 2, 2022     [place] [address] [address] [address]    Patient: Edy Olivas   MR Number: [id number]   YOB: 1945   Date of Visit: 9/1/2022       Dear Juanita Orellana: Thank you for referring Darlin Hager to me for evaluation/treatment. Below are the relevant portions of my assessment and plan of care. If you have questions, please do not hesitate to call me. I look forward to following Carter Doll along with you.     Sincerely,      Concepcion Nelson MD

## 2022-09-01 NOTE — PATIENT INSTRUCTIONS
Plan:  Remain off Ranexa  Continue rest of medications  Continue walking to keep strength up  Google balance exercises  5. Check fasting lab work now  6.  Plan to follow up in a year

## 2022-09-01 NOTE — PROGRESS NOTES
[facility] Heart Consultation Note  2022   Ref by Dr Chalo Lynn:  Ms. Washington Ragland is here for follow up for exertional chest pain, HLD and Hypertension in setting of Parkinson's. [place]:  Today, she feeling tired but much better than last visit. She notes that Ranexa did make her dizziness worse so she did stop it after about 10 days. She continues on HCTZ half a pill three times a week and this seems to help. She has been seeing someone for balance issues and he notes that she does have mild tremor. She brought in list of BP readings for review. They are higher when she stands and lower when she sits. PMH  CAD, HTN, HLD, Parkinson's and Sjogren's disease  Negative stress test 2016. She underwent LHC 16 which revealed  Moderate disease of proximal circumflex and ostial small first diagonal branch. ejection fraction of 75%. At visit, 2022, she reported walking about 1/2 mile twice a day. Review of Systems:  12 point ROS negative in all areas as listed below except as in [place]  Constitutional, EENT, Cardiovascular, pulmonary, GI, , Musculoskeletal, skin, neurological, hematological, endocrine, Psychiatric      Reviewed past medical history, social, and family history.    Nonsmoker no alcohol or drug abuse  Mom alzheimers disease  Dad heart disease  of MI at 52  Past Medical History:   Diagnosis Date    Allergic rhinitis     Arthritis     CAD (coronary artery disease)     Fractures     Hyperlipidemia     Hypertension     Migraine headache     since 20's    Parkinson disease ([address]     Reactive airway disease 2008    Shingles 10/2019    GROIN LEFT    Sjogren's syndrome ([facility] [address]     Dr. Bentley Nevarez     Past Surgical History:   Procedure Laterality Date    ARTHROGRAPHY Left 2020    LEFT HIP ASPIRATION WITH SYNOVASURE  CPT CODE - 18190 performed by Sajan Tucker MD at [facility] Left 2021    COLONOSCOPY 09/15/2010    Dr. Belen Cárdenas  12/22/2016    DENTAL SURGERY  12/04/2019    TEETH EXTRACTION    DUPUYTRENS CONTRACTURE SURGERY Left 06/17/2019    LEFT DEQUERVAIN'S RELEASE performed by Jing Edward MD at [address] [facility] / FINGER Left 06/17/2019    EXCISION LEFT WRIST GANGLION performed by Jing Edward MD at [facility] Left     broke the femur    HIP SURGERY      JOINT REPLACEMENT Left 04/2018    HIP    REVISION TOTAL HIP ARTHROPLASTY Left 01/29/2020    REVISION LEFT HEMIARTHROPLASTY TO LATERAL TOTAL HIP ARTHROPLASTY WITH CELLSAVER AND FASCIAILIACA BLOCK FOR PAIN CONTROL     MONTANA & NEPHEW  CPT CODE - 16696 performed by Katherine Vergara MD at [address]  07/2015    TUBAL LIGATION  1972       Objective:   /68   Pulse 67   Temp 98.2 °F (36.8 °C)   Ht 5' 2\" (1.575 m)   Wt 120 lb 12.8 oz (54.8 kg)   SpO2 97%   BMI 22.09 kg/m²     Wt Readings from Last 3 Encounters:   09/01/22 120 lb 12.8 oz (54.8 kg)   06/27/22 120 lb (54.4 kg)   05/17/22 122 lb (55.3 kg)       Physical Exam:  General: No Respiratory distress, appears well developed and well nourished. Eyes:  Sclera nonicteric  Nose/Sinuses:  negative findings: nose shows no deformity, asymmetry, or inflammation, nasal mucosa normal, septum midline with no perforation or bleeding  Back:  no pain to palpation  Joint:  no active joint inflammation  Musculoskeletal:  negative  Skin:  Warm and dry  Neck:  Negative for JVD and Carotid Bruits. Chest:  Clear to auscultation, respiration easy  Cardiovascular:  RRR, 72 bpm, S1S2 normal, no murmur, no rub or thrill.   Abdomen:  Soft normal liver and spleen  Extremities:   No edema, clubbing, cyanosis,  Pulses:  pedal pulses are normal.  Neuro: intact    Medications:   Outpatient Encounter Medications as of 9/1/2022   Medication Sig Dispense Refill    isosorbide mononitrate (IMDUR) 30 MG extended release tablet TAKE 1 TABLET BY MOUTH ONCE DAILY 90 tablet 3    alendronate (FOSAMAX) 70 MG tablet Take 1 tablet by mouth once a week 12 tablet 0    metoprolol tartrate (LOPRESSOR) 25 MG tablet Take 1 tablet by mouth twice daily 60 tablet 0    traMADol (ULTRAM) 50 MG tablet Take 1 tablet by mouth daily as needed for Pain for up to 30 days. 28 tablet 0    acetaminophen (TYLENOL) 650 MG extended release tablet Take 650 mg by mouth every 8 hours as needed for Pain      amLODIPine (NORVASC) 5 MG tablet Take 1 tablet by mouth nightly 90 tablet 3    losartan (COZAAR) 25 MG tablet Take 1 tablet by mouth daily 90 tablet 3    hydroCHLOROthiazide (HYDRODIURIL) 25 MG tablet Take half tablet on Monday, Wednesday and Friday only 36 tablet 3    aspirin EC 81 MG EC tablet Take 1 tablet by mouth daily 90 tablet 1    meloxicam (MOBIC) 15 MG tablet TAKE 1 TABLET BY MOUTH ONCE DAILY AS NEEDED FOR PAIN 90 tablet 0    montelukast (SINGULAIR) 10 MG tablet Take 1 tablet by mouth nightly 90 tablet 3    lovastatin (MEVACOR) 20 MG tablet Take 1 tablet by mouth nightly 90 tablet 3    carbidopa-levodopa (SINEMET)  MG per tablet Take 1 tablet by mouth 4 times daily 360 tablet 3    carboxymethylcellulose 1 % ophthalmic solution Place 1 drop into both eyes 4 times daily      Calcium Carbonate-Vitamin D (OYSTER SHELL CALCIUM/D) 500-200 MG-UNIT TABS TAKE 1 TABLET BY MOUTH ONCE DAILY 30 tablet 0    nitroGLYCERIN (NITROSTAT) 0.4 MG SL tablet Place 1 tablet under the tongue every 5 minutes as needed for Chest pain 25 tablet 3    Multiple Vitamins-Minerals (THERAPEUTIC MULTIVITAMIN-MINERALS) tablet Take 1 tablet by mouth daily      mometasone (NASONEX) 50 MCG/ACT nasal spray 2 sprays by Nasal route daily. Each nostril 3 g 3    [DISCONTINUED] isosorbide mononitrate (IMDUR) 30 MG extended release tablet TAKE 1 TABLET BY MOUTH ONCE DAILY (APPOINTMENT REQUIRED FOR FUTURE REFILLS).  90 tablet 0    [DISCONTINUED] losartan (COZAAR) 50 MG tablet Take 1 tablet by mouth once daily (Patient not taking: Reported on 9/1/2022) 90 tablet 0    [DISCONTINUED] ranolazine (RANEXA) 500 MG extended release tablet Take 1 tablet by mouth 2 times daily (Patient not taking: Reported on 9/1/2022) 60 tablet 3     No facility-administered encounter medications on file as of 9/1/2022. Lab Data:  Lab Results   Component Value Date     (L) 08/17/2022    K 5.0 08/17/2022    CL 99 08/17/2022    CO2 30 08/17/2022    BUN 12 08/17/2022    CREATININE 0.7 08/17/2022    GLUCOSE 98 08/17/2022    CALCIUM 9.2 08/17/2022    PROT 8.3 (H) 05/17/2022    LABALBU 4.2 05/17/2022    BILITOT 0.5 05/17/2022    ALKPHOS 62 05/17/2022    AST 20 05/17/2022    ALT 8 (L) 05/17/2022    LABGLOM >60 08/17/2022    GFRAA >60 08/17/2022    AGRATIO 1.0 (L) 05/17/2022    GLOB 4.1 08/18/2020     Lab Results   Component Value Date    WBC 3.9 (L) 05/17/2022    HGB 12.6 05/17/2022    HCT 36.9 05/17/2022    MCV 98.5 05/17/2022     05/17/2022     Lab Results   Component Value Date    TSH 2.37 08/18/2020    T4FREE 0.9 08/18/2020     Lab Results   Component Value Date    LABA1C 5.3 01/07/2020     LIPID:   Lab Results   Component Value Date    CHOL 195 01/22/2019    CHOL 158 05/25/2018    CHOL 167 12/22/2016     Lab Results   Component Value Date    TRIG 131 01/22/2019    TRIG 207 (H) 05/25/2018    TRIG 139 12/22/2016     Lab Results   Component Value Date    HDL 45 01/22/2019    HDL 43 05/25/2018    HDL 47 07/28/2017     Lab Results   Component Value Date    LDLCALC 124 (H) 01/22/2019    LDLCALC 74 05/25/2018    LDLCALC 102 (H) 07/28/2017     Lab Results   Component Value Date    LABVLDL 26 01/22/2019    LABVLDL 41 05/25/2018    LABVLDL 22 07/28/2017     No results found for: CHOLHDLRATIO  PT/INR: No results for input(s): PROTIME, INR in the last 72 hours. A1C:   Lab Results   Component Value Date    LABA1C 5.3 01/07/2020     BNP:  No results for input(s): BNP in the last 72 hours.   Imaging  Pasha-Myoview 7//2022  Stress Interpretation There is normal isotope uptake at stress and rest.  There is no evidence of myocardial ischemia or  scar. Normal myocardial wall motion and thickening. Post-stress LVEF is normal at >70%. TID <1.3. EKG 6.27.22  Normal EKG     EKG 6/28/21  Sinus  Rhythm   88 bpm    C 12/23/16  CORONARY ANGIOGRAPHY:  1. The left main was a large caliber vessel that bifurcated. It was patent. 2. The left circumflex was a medium caliber vessel that was dominant. After it  gave off a large first obtuse marginal branch, there was 60% discrete stenosis  in the circumflex. The first obtuse marginal branch was large and free of  significant disease. In the posterior AV groove, the circumflex gave off 2  small to medium caliber left posterolateral branches and a small left  posterior descending artery, which were all normal.     3. The LAD was a medium caliber vessel that reached the apex. The mid-LAD had  20% diffuse stenosis. It gave off a small diagonal branch that had 60% ostial  stenosis. 4. The right coronary artery was small and non-dominant and widely patent. IMPRESSION:   1. Moderate disease of proximal circumflex and ostial small first diagonal branch. 2. Hyperdynamic left ventricle with ejection fraction of 75%. 3. Normal left ventricular filling pressure. ECHO 12/13/16  Summary  Left ventricular systolic function is normal with an ejection fraction of 55-60%. No wall motion abnormalities noted. Normal left ventricular size and wall thickness. Diastolic filling parameters suggests grade I diastolic dysfunction. Trivial mitral and tricuspid regurgitation. Systolic pulmonary artery pressure (SPAP) is normal and estimated at 32 mmHg (RA pressure 3 mm Hg). CXR 12/13/16  FINDINGS: The lungs are without acute focal process. Scattered calcified granulomas are again seen the lungs. There is no effusion or pneumothorax. The cardiomediastinal silhouette is without acute process.  The osseous structures are without acute process. There has been no significant interval change compared to prior. Myoview stress test:  07/19/2016  Normal myocardial perfusion study with normal left ventricular function,    size, and wall motion. The estimated left ventricular function is 76%. Assessment:  Moderate CAD with exertional chest pain stable now with meds   ~S/P Cath 12/22/2016 noted moderate disease of proximal circumflex and ostial small first diagonal branch.   ~Lexie-Myoview 7/6/2022 noted no ischemia with an EF >70%   ~She stopped Ranexa 500 mg due to worsening dizziness. ~Continue Imdur 30 mg daily  2. Essential hypertension: labile orthostatic could be autonomic dysfunction part of Parkinsons' disease   ~Continue on losartan 25 mg and amlodipine 5 mg and low dose HCTZ  3. Hyperlipidemia   ~Labs from 1/22/2019 reviewed with patient   ~Continue on lovastatin 20 mg   ~Recommend TSH and FLP now    4. Parkinson's disease    Labs from 8/17/2022 reviewed with patient  Medications reviewed. Refills for Imdur warranted  Plan to follow up in a year      QUALITY MEASURES  1. Tobacco Cessation Counseling: NA  2. Retake of BP if >140/90:   NA  3. Documentation to PCP/referring for new patient:  Sent to PCP at close of office visit  4. CAD patient on anti-platelet: Yes  5. CAD patient on STATIN therapy:  Yes  6. Patient with CHF and aFib on anticoagulation:  NA     This note was scribed in the presence of Malcolm Tafoya MD by Gabo Mccormcik RN. I, Dr. Ban Dockery, personally performed the services described in this documentation, as scribed by the above signed scribe in my presence. It is both accurate and complete to my knowledge. I agree with the details independently gathered by the clinical support staff, while the remaining scribed note accurately describes my personal service to the patient.             Malcolm Tafoya MD  [facility]  [phone] [place] office  [phone] [facility]

## 2022-11-01 ENCOUNTER — OFFICE VISIT (OUTPATIENT)
Dept: FAMILY MEDICINE CLINIC | Age: 77
End: 2022-11-01
Payer: MEDICARE

## 2022-11-01 VITALS
HEART RATE: 63 BPM | OXYGEN SATURATION: 97 % | SYSTOLIC BLOOD PRESSURE: 130 MMHG | BODY MASS INDEX: 21.31 KG/M2 | WEIGHT: 115.8 LBS | HEIGHT: 62 IN | DIASTOLIC BLOOD PRESSURE: 70 MMHG

## 2022-11-01 DIAGNOSIS — C44.92 SCC (SQUAMOUS CELL CARCINOMA): Primary | ICD-10-CM

## 2022-11-01 PROCEDURE — 99214 OFFICE O/P EST MOD 30 MIN: CPT | Performed by: FAMILY MEDICINE

## 2022-11-01 PROCEDURE — 3078F DIAST BP <80 MM HG: CPT | Performed by: FAMILY MEDICINE

## 2022-11-01 PROCEDURE — 3074F SYST BP LT 130 MM HG: CPT | Performed by: FAMILY MEDICINE

## 2022-11-01 PROCEDURE — 1123F ACP DISCUSS/DSCN MKR DOCD: CPT | Performed by: FAMILY MEDICINE

## 2022-11-01 ASSESSMENT — PATIENT HEALTH QUESTIONNAIRE - PHQ9
SUM OF ALL RESPONSES TO PHQ QUESTIONS 1-9: 0
2. FEELING DOWN, DEPRESSED OR HOPELESS: 0
SUM OF ALL RESPONSES TO PHQ9 QUESTIONS 1 & 2: 0
1. LITTLE INTEREST OR PLEASURE IN DOING THINGS: 0
SUM OF ALL RESPONSES TO PHQ QUESTIONS 1-9: 0

## 2022-11-01 NOTE — PROGRESS NOTES
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 female with significant past medical history of above who presents for preoperative exam       Past Medical History:   Diagnosis Date    Allergic rhinitis     Arthritis     CAD (coronary artery disease)     Fractures     Hyperlipidemia     Hypertension     Migraine headache     since 20's    Parkinson disease ([facility] [address]     Reactive airway disease 2008    Shingles 10/2019    GROIN LEFT    Sjogren's syndrome ([facility] [address] 2012    Dr. Juanjose King     Past Surgical History:   Procedure Laterality Date    ARTHROGRAPHY Left 01/08/2020    LEFT HIP ASPIRATION WITH SYNOVASURE  CPT CODE - 14731 performed by Charlotte Severino MD at [address] [place] [address] WITH IMPLANT Left 09/08/2021    COLONOSCOPY  09/15/2010    Dr. Alex Hi  12/22/2016    DENTAL SURGERY  12/04/2019    TEETH EXTRACTION    DUPUYTRENS CONTRACTURE SURGERY Left 06/17/2019    LEFT DEQUERVAIN'S RELEASE performed by Suhail Coon MD at [facility] / FINGER Left 06/17/2019    EXCISION LEFT WRIST GANGLION performed by Suhail Coon MD at [facility] Left     broke the femur    HIP SURGERY      JOINT REPLACEMENT Left 04/2018    HIP    REVISION TOTAL HIP ARTHROPLASTY Left 01/29/2020    REVISION LEFT HEMIARTHROPLASTY TO LATERAL TOTAL HIP ARTHROPLASTY WITH CELLSAVER AND FASCIAILIACA BLOCK FOR PAIN CONTROL     MONTANA & NEPHEW  CPT CODE - 15084 performed by Charlotte Severino MD at [address]  07/2015    TUBAL LIGATION  1972     Current Outpatient Medications   Medication Sig Dispense Refill    isosorbide mononitrate (IMDUR) 30 MG extended release tablet TAKE 1 TABLET BY MOUTH ONCE DAILY 90 tablet 3    alendronate (FOSAMAX) 70 MG tablet Take 1 tablet by mouth once a week 12 tablet 0    metoprolol tartrate (LOPRESSOR) 25 MG tablet Take 1 tablet by mouth twice daily 60 tablet 0    acetaminophen (TYLENOL) 650 MG extended release tablet Take 650 mg by mouth every 8 hours as needed for Pain      amLODIPine (NORVASC) 5 MG tablet Take 1 tablet by mouth nightly 90 tablet 3    losartan (COZAAR) 25 MG tablet Take 1 tablet by mouth daily 90 tablet 3    hydroCHLOROthiazide (HYDRODIURIL) 25 MG tablet Take half tablet on Monday, Wednesday and Friday only 36 tablet 3    aspirin EC 81 MG EC tablet Take 1 tablet by mouth daily 90 tablet 1    lovastatin (MEVACOR) 20 MG tablet Take 1 tablet by mouth nightly 90 tablet 3    carbidopa-levodopa (SINEMET)  MG per tablet Take 1 tablet by mouth 4 times daily 360 tablet 3    carboxymethylcellulose 1 % ophthalmic solution Place 1 drop into both eyes 4 times daily      nitroGLYCERIN (NITROSTAT) 0.4 MG SL tablet Place 1 tablet under the tongue every 5 minutes as needed for Chest pain 25 tablet 3    Multiple Vitamins-Minerals (THERAPEUTIC MULTIVITAMIN-MINERALS) tablet Take 1 tablet by mouth daily      meloxicam (MOBIC) 15 MG tablet TAKE 1 TABLET BY MOUTH ONCE DAILY AS NEEDED FOR PAIN (Patient not taking: Reported on 11/1/2022) 90 tablet 0    montelukast (SINGULAIR) 10 MG tablet Take 1 tablet by mouth nightly (Patient not taking: Reported on 11/1/2022) 90 tablet 3    Calcium Carbonate-Vitamin D (OYSTER SHELL CALCIUM/D) 500-200 MG-UNIT TABS TAKE 1 TABLET BY MOUTH ONCE DAILY 30 tablet 0    mometasone (NASONEX) 50 MCG/ACT nasal spray 2 sprays by Nasal route daily. Each nostril (Patient not taking: Reported on 11/1/2022) 3 g 3     No current facility-administered medications for this visit.          Allergies:  Amoxicillin and Oxycodone  History of allergic reaction to anesthesia:  No     Social History     Tobacco Use   Smoking Status Never Smokeless Tobacco Never     The patient states she drinks zero per week. Family History   Problem Relation Age of Onset    Heart Attack Father     High Cholesterol Father     Heart Disease Father     High Cholesterol Mother        REVIEW OF SYSTEMS:    CONSTITUTIONAL:  negative  EYES:  negative  HEENT:  negative  RESPIRATORY:  negative  CARDIOVASCULAR:  negative  GASTROINTESTINAL:  negative  GENITOURINARY:  negative  INTEGUMENT/BREAST:  negative  HEMATOLOGIC/LYMPHATIC:  negative  ALLERGIC/IMMUNOLOGIC:  negative  ENDOCRINE:  negative  MUSCULOSKELETAL:  negative  NEUROLOGICAL:  negative    PHYSICAL EXAM:      BP (!) 144/82   Pulse 63   Ht 5' 2\" (1.575 m)   Wt 115 lb 12.8 oz (52.5 kg)   SpO2 97%   BMI 21.18 kg/m²     CONSTITUTIONAL:  awake, alert, cooperative, no apparent distress, and appears stated age    Eyes:  Lids and lashes normal, pupils equal, round and reactive to light, extra ocular muscles intact, sclera clear, conjunctiva normal    Head/ENT:  Normocephalic, without obvious abnormality, atramatic, sinuses nontender on palpation, external ears without lesions, oral pharynx with moist mucus membranes, tonsils without erythema or exudates, gums normal and good dentition. Neck:  Supple, symmetrical, trachea midline, no adenopathy, thyroid symmetric, not enlarged and no tenderness, skin normal, No carotid bruit    Heart:  Normal apical impulse, regular rate and rhythm, normal S1 and S2, no S3 or S4, and no murmur noted    Lungs:  No increased work of breathing, good air exchange, clear to auscultation bilaterally, no crackles or wheezing    Abdomen:  No scars, normal bowel sounds, soft, non-distended, non-tender, no masses palpated, no hepatosplenomegally    Extremities:  No clubbing, cyanosis, or edema    NEUROLOGIC:  Awake, alert, oriented to name, place and time. Cranial nerves II-XII are grossly intact. Motor is 5 out of 5 bilaterally. ASSESSMENT AND PLAN:    1. Patient is a 68 y.o. female with above specified procedure planned on 11/8/2022 with Dr. Portillo Bone at [facility].     2. Stop ASA/NSAIDS medications 7-10 days prior to procedure.     Richi Ochoa MD    [address] [place] [address]  [phone]

## 2022-11-16 DIAGNOSIS — E78.2 MIXED HYPERLIPIDEMIA: ICD-10-CM

## 2022-11-17 LAB
CHOLESTEROL, FASTING: 188 MG/DL (ref 0–199)
HDLC SERPL-MCNC: 47 MG/DL (ref 40–60)
LDL CHOLESTEROL CALCULATED: 118 MG/DL
TRIGLYCERIDE, FASTING: 117 MG/DL (ref 0–150)
TSH REFLEX FT4: 2.34 UIU/ML (ref 0.27–4.2)
VLDLC SERPL CALC-MCNC: 23 MG/DL

## 2022-11-18 ENCOUNTER — TELEPHONE (OUTPATIENT)
Dept: CARDIOLOGY CLINIC | Age: 77
End: 2022-11-18

## 2022-11-18 DIAGNOSIS — Z79.899 MEDICATION MANAGEMENT: Primary | ICD-10-CM

## 2022-11-18 RX ORDER — LOVASTATIN 20 MG/1
40 TABLET ORAL NIGHTLY
Qty: 180 TABLET | Refills: 3 | Status: SHIPPED | OUTPATIENT
Start: 2022-11-18

## 2022-11-18 NOTE — TELEPHONE ENCOUNTER
----- Message from Mariela Hurt MD sent at 11/17/2022  1:12 PM EST -----  Thyroid test OK. Lipids show  (not at goal--need < 70). Med list shows mevacor 20mg daily. Would double dose to 40mg daily and recheck FLP/LFT's in 2 months. Call if any issues. Watch diet closely also.

## 2022-11-18 NOTE — TELEPHONE ENCOUNTER
Spoke with patient regarding results. New script sent. Pt requested to take two 20 mg tabs daily since she knows it is free. LFT and FLP ordered for 2 months. All questions answered. Patient verbalized understanding.

## 2022-11-23 DIAGNOSIS — M85.80 OSTEOPENIA, UNSPECIFIED LOCATION: ICD-10-CM

## 2022-11-23 RX ORDER — ALENDRONATE SODIUM 70 MG/1
TABLET ORAL
Qty: 12 TABLET | Refills: 0 | Status: SHIPPED | OUTPATIENT
Start: 2022-11-23

## 2022-11-23 NOTE — TELEPHONE ENCOUNTER
Last Office Visit  -  11/1/22  Next Office Visit  -  n/a    Last Filled  -    Last UDS -    Contract -

## 2023-01-30 DIAGNOSIS — G20 PARKINSON DISEASE (HCC): ICD-10-CM

## 2023-01-30 NOTE — TELEPHONE ENCOUNTER
Last Office Visit  -  11/1/22  Next Office Visit  -  none    Last Filled  -    Last UDS -    Contract -

## 2023-02-01 DIAGNOSIS — Z79.899 MEDICATION MANAGEMENT: ICD-10-CM

## 2023-02-01 LAB
ALBUMIN SERPL-MCNC: 4 G/DL (ref 3.4–5)
ALP BLD-CCNC: 74 U/L (ref 40–129)
ALT SERPL-CCNC: 9 U/L (ref 10–40)
AST SERPL-CCNC: 47 U/L (ref 15–37)
BILIRUB SERPL-MCNC: 0.3 MG/DL (ref 0–1)
BILIRUBIN DIRECT: <0.2 MG/DL (ref 0–0.3)
BILIRUBIN, INDIRECT: ABNORMAL MG/DL (ref 0–1)
CHOLESTEROL, TOTAL: 170 MG/DL (ref 0–199)
HDLC SERPL-MCNC: 41 MG/DL (ref 40–60)
LDL CHOLESTEROL CALCULATED: 84 MG/DL
TOTAL PROTEIN: 7.8 G/DL (ref 6.4–8.2)
TRIGL SERPL-MCNC: 225 MG/DL (ref 0–150)
VLDLC SERPL CALC-MCNC: 45 MG/DL

## 2023-02-20 DIAGNOSIS — R07.81 RIB PAIN: ICD-10-CM

## 2023-02-20 DIAGNOSIS — F51.01 PRIMARY INSOMNIA: ICD-10-CM

## 2023-02-20 DIAGNOSIS — M85.80 OSTEOPENIA, UNSPECIFIED LOCATION: ICD-10-CM

## 2023-02-20 RX ORDER — ALPRAZOLAM 1 MG/1
TABLET ORAL
Qty: 30 TABLET | Refills: 0 | Status: SHIPPED | OUTPATIENT
Start: 2023-02-20 | End: 2023-03-22

## 2023-02-20 RX ORDER — TRAMADOL HYDROCHLORIDE 50 MG/1
TABLET ORAL
Qty: 28 TABLET | Refills: 0 | Status: SHIPPED | OUTPATIENT
Start: 2023-02-20 | End: 2023-03-22

## 2023-02-20 RX ORDER — ALENDRONATE SODIUM 70 MG/1
TABLET ORAL
Qty: 12 TABLET | Refills: 1 | Status: SHIPPED | OUTPATIENT
Start: 2023-02-20

## 2023-02-20 RX ORDER — LOSARTAN POTASSIUM 50 MG/1
TABLET ORAL
Qty: 90 TABLET | Refills: 1 | Status: SHIPPED | OUTPATIENT
Start: 2023-02-20

## 2023-02-20 NOTE — TELEPHONE ENCOUNTER
Last Office Visit  -  11/1/22  Next Office Visit  -      Last Filled  -  8/15/22, 6/20/22  Last UDS -    Contract -

## 2023-04-24 DIAGNOSIS — E78.00 HYPERCHOLESTEROLEMIA: Primary | ICD-10-CM

## 2023-05-05 RX ORDER — MONTELUKAST SODIUM 10 MG/1
10 TABLET ORAL NIGHTLY
Qty: 30 TABLET | Refills: 0 | Status: SHIPPED | OUTPATIENT
Start: 2023-05-05

## 2023-05-05 RX ORDER — AMLODIPINE BESYLATE 5 MG/1
TABLET ORAL
Qty: 30 TABLET | Refills: 0 | Status: SHIPPED | OUTPATIENT
Start: 2023-05-05

## 2023-05-11 DIAGNOSIS — R53.83 FATIGUE, UNSPECIFIED TYPE: ICD-10-CM

## 2023-05-11 DIAGNOSIS — E78.00 HYPERCHOLESTEROLEMIA: ICD-10-CM

## 2023-05-11 LAB
ALBUMIN SERPL-MCNC: 4 G/DL (ref 3.4–5)
ALBUMIN/GLOB SERPL: 1.1 {RATIO} (ref 1.1–2.2)
ALP SERPL-CCNC: 64 U/L (ref 40–129)
ALT SERPL-CCNC: 7 U/L (ref 10–40)
ANION GAP SERPL CALCULATED.3IONS-SCNC: 8 MMOL/L (ref 3–16)
AST SERPL-CCNC: 34 U/L (ref 15–37)
BASOPHILS # BLD: 0 K/UL (ref 0–0.2)
BASOPHILS NFR BLD: 0.2 %
BILIRUB SERPL-MCNC: 0.4 MG/DL (ref 0–1)
BUN SERPL-MCNC: 12 MG/DL (ref 7–20)
CALCIUM SERPL-MCNC: 9 MG/DL (ref 8.3–10.6)
CHLORIDE SERPL-SCNC: 102 MMOL/L (ref 99–110)
CHOLEST SERPL-MCNC: 162 MG/DL (ref 0–199)
CO2 SERPL-SCNC: 28 MMOL/L (ref 21–32)
CREAT SERPL-MCNC: 0.7 MG/DL (ref 0.6–1.2)
DEPRECATED RDW RBC AUTO: 14.4 % (ref 12.4–15.4)
EOSINOPHIL # BLD: 0 K/UL (ref 0–0.6)
EOSINOPHIL NFR BLD: 0.5 %
GFR SERPLBLD CREATININE-BSD FMLA CKD-EPI: >60 ML/MIN/{1.73_M2}
GLUCOSE SERPL-MCNC: 91 MG/DL (ref 70–99)
HCT VFR BLD AUTO: 36.6 % (ref 36–48)
HDLC SERPL-MCNC: 49 MG/DL (ref 40–60)
HGB BLD-MCNC: 12.1 G/DL (ref 12–16)
LDL CHOLESTEROL CALCULATED: 85 MG/DL
LYMPHOCYTES # BLD: 1.1 K/UL (ref 1–5.1)
LYMPHOCYTES NFR BLD: 32.3 %
MCH RBC QN AUTO: 31.8 PG (ref 26–34)
MCHC RBC AUTO-ENTMCNC: 33.1 G/DL (ref 31–36)
MCV RBC AUTO: 96 FL (ref 80–100)
MONOCYTES # BLD: 0.5 K/UL (ref 0–1.3)
MONOCYTES NFR BLD: 14.2 %
NEUTROPHILS # BLD: 1.7 K/UL (ref 1.7–7.7)
NEUTROPHILS NFR BLD: 52.8 %
PLATELET # BLD AUTO: 182 K/UL (ref 135–450)
PMV BLD AUTO: 7.9 FL (ref 5–10.5)
POTASSIUM SERPL-SCNC: 4.9 MMOL/L (ref 3.5–5.1)
PROT SERPL-MCNC: 7.7 G/DL (ref 6.4–8.2)
RBC # BLD AUTO: 3.82 M/UL (ref 4–5.2)
SODIUM SERPL-SCNC: 138 MMOL/L (ref 136–145)
TRIGL SERPL-MCNC: 138 MG/DL (ref 0–150)
VLDLC SERPL CALC-MCNC: 28 MG/DL
WBC # BLD AUTO: 3.3 K/UL (ref 4–11)

## 2023-06-06 DIAGNOSIS — R07.81 RIB PAIN: ICD-10-CM

## 2023-06-06 DIAGNOSIS — F51.01 PRIMARY INSOMNIA: ICD-10-CM

## 2023-06-06 RX ORDER — AMLODIPINE BESYLATE 5 MG/1
TABLET ORAL
Qty: 30 TABLET | Refills: 0 | Status: SHIPPED | OUTPATIENT
Start: 2023-06-06

## 2023-06-06 RX ORDER — HYDROCHLOROTHIAZIDE 25 MG/1
TABLET ORAL
Qty: 36 TABLET | Refills: 3 | Status: SHIPPED | OUTPATIENT
Start: 2023-06-06

## 2023-06-06 RX ORDER — MONTELUKAST SODIUM 10 MG/1
10 TABLET ORAL NIGHTLY
Qty: 30 TABLET | Refills: 0 | Status: SHIPPED | OUTPATIENT
Start: 2023-06-06

## 2023-06-06 RX ORDER — TRAMADOL HYDROCHLORIDE 50 MG/1
TABLET ORAL
Qty: 28 TABLET | Refills: 0 | Status: SHIPPED | OUTPATIENT
Start: 2023-06-06 | End: 2023-07-06

## 2023-06-06 RX ORDER — ALPRAZOLAM 1 MG/1
TABLET ORAL
Qty: 30 TABLET | Refills: 0 | Status: SHIPPED | OUTPATIENT
Start: 2023-06-06 | End: 2023-07-06

## 2023-06-06 NOTE — TELEPHONE ENCOUNTER
Last Office Visit  -  11/1/22  Next Office Visit  -      Last Filled  -  2/20/23  Last UDS -    Contract -

## 2023-06-29 ENCOUNTER — TELEPHONE (OUTPATIENT)
Dept: CARDIOLOGY CLINIC | Age: 78
End: 2023-06-29

## 2023-07-05 ENCOUNTER — TELEPHONE (OUTPATIENT)
Dept: FAMILY MEDICINE CLINIC | Age: 78
End: 2023-07-05

## 2023-07-05 NOTE — TELEPHONE ENCOUNTER
Pt wants to know if she Needs to stop taking Fosamax before her Dental implant procedure on 7/18/23 ? Please Advise.

## 2023-07-29 DIAGNOSIS — M85.80 OSTEOPENIA, UNSPECIFIED LOCATION: ICD-10-CM

## 2023-07-31 RX ORDER — ALENDRONATE SODIUM 70 MG/1
TABLET ORAL
Qty: 12 TABLET | Refills: 0 | Status: SHIPPED | OUTPATIENT
Start: 2023-07-31

## 2023-07-31 RX ORDER — MONTELUKAST SODIUM 10 MG/1
10 TABLET ORAL NIGHTLY
Qty: 30 TABLET | Refills: 0 | Status: SHIPPED | OUTPATIENT
Start: 2023-07-31

## 2023-07-31 RX ORDER — AMLODIPINE BESYLATE 5 MG/1
TABLET ORAL
Qty: 30 TABLET | Refills: 0 | Status: SHIPPED | OUTPATIENT
Start: 2023-07-31

## 2023-07-31 NOTE — TELEPHONE ENCOUNTER
Refill Request     Last Seen: Last Seen Department: 1/7/2021  Last Seen by PCP: 11/1/2022    Last Written: 6/6/23      Next Appointment:   Future Appointments   Date Time Provider [address] [place] [address]   9/25/2023  2:15 PM Daria Brasher MD [facility]         Requested Prescriptions     Pending Prescriptions Disp Refills    metoprolol tartrate (LOPRESSOR) 25 MG tablet [Pharmacy Med Name: Metoprolol Tartrate 25 MG Oral Tablet] 60 tablet 0     Sig: Take 1 tablet by mouth twice daily    alendronate (FOSAMAX) 70 MG tablet [Pharmacy Med Name: Alendronate Sodium 70 MG Oral Tablet] 12 tablet 0     Sig: Take 1 tablet by mouth once a week    montelukast (SINGULAIR) 10 MG tablet [Pharmacy Med Name: Montelukast Sodium 10 MG Oral Tablet] 30 tablet 0     Sig: Take 1 tablet by mouth nightly    amLODIPine (NORVASC) 5 MG tablet [Pharmacy Med Name: amLODIPine Besylate 5 MG Oral Tablet] 30 tablet 0     Sig: TAKE 2 TABLETS BY MOUTH NIGHTLY

## 2023-08-03 RX ORDER — LOSARTAN POTASSIUM 50 MG/1
TABLET ORAL
Qty: 90 TABLET | Refills: 0 | Status: SHIPPED | OUTPATIENT
Start: 2023-08-03

## 2023-08-03 NOTE — TELEPHONE ENCOUNTER
Last Office Visit  -  11/1/22  Next Office Visit  -  n/a    Last Filled  -  2/20/23  Last UDS -   Contract -

## 2023-08-28 ENCOUNTER — PATIENT MESSAGE (OUTPATIENT)
Dept: FAMILY MEDICINE CLINIC | Age: 78
End: 2023-08-28

## 2023-08-28 NOTE — TELEPHONE ENCOUNTER
From: Yessi Johnson  To: Dr. Carla Keenan: 8/28/2023 9:05 AM EDT  Subject: vascular screening    Hi Dr. Liliana Obrien,    I had a vascular screening done at [facility] and it's recommended that I follow up with you. I requested they send you a copy of the results as well. The carotid artery ultrasound showed moderate-to-large plaque in the right & left ICA. The PSV was 102 on the right & 206 on the left. The EDV was 25.2 on the right & 48.8 on the left. What is your recommendation? Thank you so much for all your help.   Jaime Parmar

## 2023-08-31 ENCOUNTER — TELEMEDICINE (OUTPATIENT)
Dept: FAMILY MEDICINE CLINIC | Age: 78
End: 2023-08-31
Payer: MEDICARE

## 2023-08-31 DIAGNOSIS — G20 PARKINSON DISEASE (HCC): ICD-10-CM

## 2023-08-31 DIAGNOSIS — I65.23 OCCLUSION AND STENOSIS OF BILATERAL CAROTID ARTERIES: ICD-10-CM

## 2023-08-31 DIAGNOSIS — I25.118 CORONARY ARTERY DISEASE OF NATIVE ARTERY OF NATIVE HEART WITH STABLE ANGINA PECTORIS (HCC): ICD-10-CM

## 2023-08-31 DIAGNOSIS — M35.00 SJOGREN'S SYNDROME, WITH UNSPECIFIED ORGAN INVOLVEMENT (HCC): ICD-10-CM

## 2023-08-31 DIAGNOSIS — I77.9 BILATERAL CAROTID ARTERY DISEASE, UNSPECIFIED TYPE (HCC): Primary | ICD-10-CM

## 2023-08-31 PROCEDURE — 99214 OFFICE O/P EST MOD 30 MIN: CPT | Performed by: FAMILY MEDICINE

## 2023-08-31 PROCEDURE — 1123F ACP DISCUSS/DSCN MKR DOCD: CPT | Performed by: FAMILY MEDICINE

## 2023-08-31 ASSESSMENT — PATIENT HEALTH QUESTIONNAIRE - PHQ9
SUM OF ALL RESPONSES TO PHQ9 QUESTIONS 1 & 2: 0
SUM OF ALL RESPONSES TO PHQ QUESTIONS 1-9: 0
2. FEELING DOWN, DEPRESSED OR HOPELESS: 0
1. LITTLE INTEREST OR PLEASURE IN DOING THINGS: 0

## 2023-09-05 RX ORDER — ISOSORBIDE MONONITRATE 30 MG/1
TABLET, EXTENDED RELEASE ORAL
Qty: 90 TABLET | Refills: 0 | Status: SHIPPED | OUTPATIENT
Start: 2023-09-05

## 2023-09-06 ENCOUNTER — HOSPITAL ENCOUNTER (OUTPATIENT)
Dept: VASCULAR LAB | Age: 78
Discharge: HOME | End: 2023-09-06
Payer: MEDICARE

## 2023-09-06 DIAGNOSIS — I77.9 BILATERAL CAROTID ARTERY DISEASE, UNSPECIFIED TYPE (HCC): ICD-10-CM

## 2023-09-06 DIAGNOSIS — I65.23 OCCLUSION AND STENOSIS OF BILATERAL CAROTID ARTERIES: ICD-10-CM

## 2023-09-06 PROCEDURE — 93880 EXTRACRANIAL BILAT STUDY: CPT

## 2023-09-07 ENCOUNTER — APPOINTMENT (OUTPATIENT)
Dept: GENERAL RADIOLOGY | Age: 78
End: 2023-09-07
Payer: MEDICARE

## 2023-09-07 ENCOUNTER — APPOINTMENT (OUTPATIENT)
Dept: CT IMAGING | Age: 78
End: 2023-09-07
Payer: MEDICARE

## 2023-09-07 ENCOUNTER — HOSPITAL ENCOUNTER (INPATIENT)
Age: 78
LOS: 1 days | Discharge: HOME | End: 2023-09-08
Attending: EMERGENCY MEDICINE | Admitting: INTERNAL MEDICINE
Payer: MEDICARE

## 2023-09-07 DIAGNOSIS — R20.0 HAND NUMBNESS: ICD-10-CM

## 2023-09-07 DIAGNOSIS — H53.9 VISUAL DISTURBANCE: Primary | ICD-10-CM

## 2023-09-07 PROBLEM — G45.9 TIA (TRANSIENT ISCHEMIC ATTACK): Status: ACTIVE | Noted: 2023-09-07

## 2023-09-07 LAB
ALBUMIN SERPL-MCNC: 3.9 G/DL (ref 3.4–5)
ALBUMIN/GLOB SERPL: 0.9 {RATIO} (ref 1.1–2.2)
ALP SERPL-CCNC: 67 U/L (ref 40–129)
ALT SERPL-CCNC: 8 U/L (ref 10–40)
ANION GAP SERPL CALCULATED.3IONS-SCNC: 9 MMOL/L (ref 3–16)
AST SERPL-CCNC: 25 U/L (ref 15–37)
BASOPHILS # BLD: 0 K/UL (ref 0–0.2)
BASOPHILS NFR BLD: 0.3 %
BILIRUB SERPL-MCNC: 0.4 MG/DL (ref 0–1)
BUN SERPL-MCNC: 11 MG/DL (ref 7–20)
CALCIUM SERPL-MCNC: 9.7 MG/DL (ref 8.3–10.6)
CHLORIDE SERPL-SCNC: 98 MMOL/L (ref 99–110)
CO2 SERPL-SCNC: 27 MMOL/L (ref 21–32)
CREAT SERPL-MCNC: 0.8 MG/DL (ref 0.6–1.2)
DEPRECATED RDW RBC AUTO: 14.1 % (ref 12.4–15.4)
EOSINOPHIL # BLD: 0 K/UL (ref 0–0.6)
EOSINOPHIL NFR BLD: 0.3 %
GFR SERPLBLD CREATININE-BSD FMLA CKD-EPI: >60 ML/MIN/{1.73_M2}
GLUCOSE BLD-MCNC: 104 MG/DL (ref 70–99)
GLUCOSE BLD-MCNC: 119 MG/DL (ref 70–99)
GLUCOSE SERPL-MCNC: 108 MG/DL (ref 70–99)
HCT VFR BLD AUTO: 37.6 % (ref 36–48)
HGB BLD-MCNC: 12.6 G/DL (ref 12–16)
INR PPP: 0.97 (ref 0.84–1.16)
LYMPHOCYTES # BLD: 1.8 K/UL (ref 1–5.1)
LYMPHOCYTES NFR BLD: 38.5 %
MCH RBC QN AUTO: 31.6 PG (ref 26–34)
MCHC RBC AUTO-ENTMCNC: 33.5 G/DL (ref 31–36)
MCV RBC AUTO: 94.4 FL (ref 80–100)
MONOCYTES # BLD: 0.5 K/UL (ref 0–1.3)
MONOCYTES NFR BLD: 11.5 %
NEUTROPHILS # BLD: 2.3 K/UL (ref 1.7–7.7)
NEUTROPHILS NFR BLD: 49.4 %
PERFORMED ON: ABNORMAL
PERFORMED ON: ABNORMAL
PLATELET # BLD AUTO: 200 K/UL (ref 135–450)
PMV BLD AUTO: 7.2 FL (ref 5–10.5)
POTASSIUM SERPL-SCNC: 4.3 MMOL/L (ref 3.5–5.1)
PROT SERPL-MCNC: 8.3 G/DL (ref 6.4–8.2)
PROTHROMBIN TIME: 12.9 SEC (ref 11.5–14.8)
RBC # BLD AUTO: 3.98 M/UL (ref 4–5.2)
SODIUM SERPL-SCNC: 134 MMOL/L (ref 136–145)
TROPONIN, HIGH SENSITIVITY: 20 NG/L (ref 0–14)
WBC # BLD AUTO: 4.7 K/UL (ref 4–11)

## 2023-09-07 PROCEDURE — 70498 CT ANGIOGRAPHY NECK: CPT

## 2023-09-07 PROCEDURE — 84484 ASSAY OF TROPONIN QUANT: CPT

## 2023-09-07 PROCEDURE — 2580000003 HC RX 258: Performed by: INTERNAL MEDICINE

## 2023-09-07 PROCEDURE — 6360000002 HC RX W HCPCS: Performed by: INTERNAL MEDICINE

## 2023-09-07 PROCEDURE — 71045 X-RAY EXAM CHEST 1 VIEW: CPT

## 2023-09-07 PROCEDURE — 1200000000 HC SEMI PRIVATE

## 2023-09-07 PROCEDURE — 85610 PROTHROMBIN TIME: CPT

## 2023-09-07 PROCEDURE — 6370000000 HC RX 637 (ALT 250 FOR IP): Performed by: REGISTERED NURSE

## 2023-09-07 PROCEDURE — 6370000000 HC RX 637 (ALT 250 FOR IP): Performed by: INTERNAL MEDICINE

## 2023-09-07 PROCEDURE — 85025 COMPLETE CBC W/AUTO DIFF WBC: CPT

## 2023-09-07 PROCEDURE — 70450 CT HEAD/BRAIN W/O DYE: CPT

## 2023-09-07 PROCEDURE — 93005 ELECTROCARDIOGRAM TRACING: CPT | Performed by: EMERGENCY MEDICINE

## 2023-09-07 PROCEDURE — 99285 EMERGENCY DEPT VISIT HI MDM: CPT

## 2023-09-07 PROCEDURE — 80053 COMPREHEN METABOLIC PANEL: CPT

## 2023-09-07 PROCEDURE — 6360000004 HC RX CONTRAST MEDICATION: Performed by: EMERGENCY MEDICINE

## 2023-09-07 RX ORDER — LOSARTAN POTASSIUM 25 MG/1
25 TABLET ORAL DAILY
Status: DISCONTINUED | OUTPATIENT
Start: 2023-09-07 | End: 2023-09-07 | Stop reason: SDUPTHER

## 2023-09-07 RX ORDER — SODIUM CHLORIDE 9 MG/ML
INJECTION, SOLUTION INTRAVENOUS PRN
Status: DISCONTINUED | OUTPATIENT
Start: 2023-09-07 | End: 2023-09-08 | Stop reason: HOSPADM

## 2023-09-07 RX ORDER — ACETAMINOPHEN 325 MG/1
650 TABLET ORAL EVERY 6 HOURS PRN
Status: DISCONTINUED | OUTPATIENT
Start: 2023-09-07 | End: 2023-09-08 | Stop reason: HOSPADM

## 2023-09-07 RX ORDER — MECOBALAMIN 5000 MCG
5 TABLET,DISINTEGRATING ORAL NIGHTLY
Status: DISCONTINUED | OUTPATIENT
Start: 2023-09-07 | End: 2023-09-08 | Stop reason: HOSPADM

## 2023-09-07 RX ORDER — SODIUM CHLORIDE 0.9 % (FLUSH) 0.9 %
5-40 SYRINGE (ML) INJECTION EVERY 12 HOURS SCHEDULED
Status: DISCONTINUED | OUTPATIENT
Start: 2023-09-07 | End: 2023-09-08 | Stop reason: HOSPADM

## 2023-09-07 RX ORDER — MELOXICAM 7.5 MG/1
15 TABLET ORAL DAILY
Status: DISCONTINUED | OUTPATIENT
Start: 2023-09-07 | End: 2023-09-08

## 2023-09-07 RX ORDER — ASPIRIN 81 MG/1
81 TABLET ORAL DAILY
Status: DISCONTINUED | OUTPATIENT
Start: 2023-09-07 | End: 2023-09-08 | Stop reason: HOSPADM

## 2023-09-07 RX ORDER — NITROGLYCERIN 0.4 MG/1
0.4 TABLET SUBLINGUAL EVERY 5 MIN PRN
Status: DISCONTINUED | OUTPATIENT
Start: 2023-09-07 | End: 2023-09-08 | Stop reason: HOSPADM

## 2023-09-07 RX ORDER — ACETAMINOPHEN 650 MG/1
650 SUPPOSITORY RECTAL EVERY 6 HOURS PRN
Status: DISCONTINUED | OUTPATIENT
Start: 2023-09-07 | End: 2023-09-08 | Stop reason: HOSPADM

## 2023-09-07 RX ORDER — ENOXAPARIN SODIUM 100 MG/ML
30 INJECTION SUBCUTANEOUS DAILY
Status: DISCONTINUED | OUTPATIENT
Start: 2023-09-07 | End: 2023-09-08 | Stop reason: HOSPADM

## 2023-09-07 RX ORDER — MONTELUKAST SODIUM 10 MG/1
10 TABLET ORAL NIGHTLY
Status: DISCONTINUED | OUTPATIENT
Start: 2023-09-07 | End: 2023-09-08

## 2023-09-07 RX ORDER — ONDANSETRON 4 MG/1
4 TABLET, ORALLY DISINTEGRATING ORAL EVERY 8 HOURS PRN
Status: DISCONTINUED | OUTPATIENT
Start: 2023-09-07 | End: 2023-09-08 | Stop reason: HOSPADM

## 2023-09-07 RX ORDER — ONDANSETRON 2 MG/ML
4 INJECTION INTRAMUSCULAR; INTRAVENOUS EVERY 6 HOURS PRN
Status: DISCONTINUED | OUTPATIENT
Start: 2023-09-07 | End: 2023-09-08 | Stop reason: HOSPADM

## 2023-09-07 RX ORDER — POLYETHYLENE GLYCOL 3350 17 G/17G
17 POWDER, FOR SOLUTION ORAL DAILY PRN
Status: DISCONTINUED | OUTPATIENT
Start: 2023-09-07 | End: 2023-09-08 | Stop reason: HOSPADM

## 2023-09-07 RX ORDER — LOSARTAN POTASSIUM 25 MG/1
50 TABLET ORAL DAILY
Status: DISCONTINUED | OUTPATIENT
Start: 2023-09-08 | End: 2023-09-08 | Stop reason: HOSPADM

## 2023-09-07 RX ORDER — POLYVINYL ALCOHOL 14 MG/ML
1 SOLUTION/ DROPS OPHTHALMIC PRN
Status: DISCONTINUED | OUTPATIENT
Start: 2023-09-07 | End: 2023-09-08 | Stop reason: HOSPADM

## 2023-09-07 RX ORDER — SODIUM CHLORIDE 0.9 % (FLUSH) 0.9 %
5-40 SYRINGE (ML) INJECTION PRN
Status: DISCONTINUED | OUTPATIENT
Start: 2023-09-07 | End: 2023-09-08 | Stop reason: HOSPADM

## 2023-09-07 RX ORDER — ATORVASTATIN CALCIUM 10 MG/1
20 TABLET, FILM COATED ORAL NIGHTLY
Status: DISCONTINUED | OUTPATIENT
Start: 2023-09-07 | End: 2023-09-08 | Stop reason: HOSPADM

## 2023-09-07 RX ORDER — ISOSORBIDE MONONITRATE 30 MG/1
30 TABLET, EXTENDED RELEASE ORAL DAILY
Status: DISCONTINUED | OUTPATIENT
Start: 2023-09-07 | End: 2023-09-08 | Stop reason: HOSPADM

## 2023-09-07 RX ADMIN — CARBIDOPA AND LEVODOPA 1 TABLET: 25; 100 TABLET ORAL at 16:23

## 2023-09-07 RX ADMIN — CARBIDOPA AND LEVODOPA 1 TABLET: 25; 100 TABLET ORAL at 20:04

## 2023-09-07 RX ADMIN — SODIUM CHLORIDE, PRESERVATIVE FREE 10 ML: 5 INJECTION INTRAVENOUS at 20:07

## 2023-09-07 RX ADMIN — ENOXAPARIN SODIUM 30 MG: 100 INJECTION SUBCUTANEOUS at 16:23

## 2023-09-07 RX ADMIN — ASPIRIN 81 MG: 81 TABLET, COATED ORAL at 16:23

## 2023-09-07 RX ADMIN — Medication 5 MG: at 20:49

## 2023-09-07 RX ADMIN — ACETAMINOPHEN 650 MG: 325 TABLET ORAL at 23:51

## 2023-09-07 RX ADMIN — IOPAMIDOL 75 ML: 755 INJECTION, SOLUTION INTRAVENOUS at 12:35

## 2023-09-07 RX ADMIN — ISOSORBIDE MONONITRATE 30 MG: 30 TABLET, EXTENDED RELEASE ORAL at 16:23

## 2023-09-07 RX ADMIN — ATORVASTATIN CALCIUM 20 MG: 10 TABLET, FILM COATED ORAL at 20:05

## 2023-09-07 ASSESSMENT — PAIN DESCRIPTION - LOCATION
LOCATION: HEAD

## 2023-09-07 ASSESSMENT — PAIN SCALES - GENERAL
PAINLEVEL_OUTOF10: 3
PAINLEVEL_OUTOF10: 3
PAINLEVEL_OUTOF10: 4

## 2023-09-07 ASSESSMENT — ENCOUNTER SYMPTOMS
SHORTNESS OF BREATH: 0
RHINORRHEA: 0
EYE REDNESS: 0
ABDOMINAL PAIN: 0
SORE THROAT: 0

## 2023-09-07 NOTE — PLAN OF CARE
Problem: Discharge Planning  Goal: Discharge to home or other facility with appropriate resources  9/7/2023 1759 by Sheree Healy RN  Outcome: Progressing  9/7/2023 1754 by Sheree Healy RN  Outcome: Progressing  Flowsheets  Taken 9/7/2023 1612  Discharge to home or other facility with appropriate resources: Identify barriers to discharge with patient and caregiver  Taken 9/7/2023 1552  Discharge to home or other facility with appropriate resources: Identify barriers to discharge with patient and caregiver     Problem: Pain  Goal: Verbalizes/displays adequate comfort level or baseline comfort level  9/7/2023 1759 by Sheree Healy RN  Outcome: Progressing  9/7/2023 1754 by Sheree Healy RN  Outcome: Progressing     Problem: Safety - Adult  Goal: Free from fall injury  9/7/2023 1759 by Sheree Healy RN  Outcome: Progressing  9/7/2023 1754 by Sheree Healy RN  Outcome: Progressing  Flowsheets (Taken 9/7/2023 1601)  Free From Fall Injury: Instruct family/caregiver on patient safety     Problem: ABCDS Injury Assessment  Goal: Absence of physical injury  9/7/2023 1759 by Sheree Healy RN  Outcome: Progressing  9/7/2023 1754 by Sheree Healy RN  Outcome: Progressing  Flowsheets (Taken 9/7/2023 1601)  Absence of Physical Injury: Implement safety measures based on patient assessment     Problem: Neurosensory - Adult  Goal: Achieves stable or improved neurological status  Outcome: Progressing  Goal: Absence of seizures  Outcome: Progressing  Goal: Remains free of injury related to seizures activity  Outcome: Progressing  Goal: Achieves maximal functionality and self care  Outcome: Progressing     Problem: Respiratory - Adult  Goal: Achieves optimal ventilation and oxygenation  Outcome: Progressing     Problem: Skin/Tissue Integrity - Adult  Goal: Skin integrity remains intact  Outcome: Progressing  Goal: Incisions, wounds, or drain sites healing without S/S of infection  Outcome: Progressing  Goal: Oral mucous

## 2023-09-07 NOTE — ED NOTES
Code Stroke   @1222  called Code Stroke  @1222 called ct  @1222 called  Stroke team  @6792 Called Lab  @3421  Stroke team called back and spoke to Rebeka  09/07/23 1232

## 2023-09-07 NOTE — ED PROVIDER NOTES
[address] [place] [address]  ED     EMERGENCY DEPARTMENT ENCOUNTER            Pt Name: Roula Light   MRN: [id number]   [facility] 1945   Date of evaluation: 9/7/2023   Provider: Karina Gutiérrez MD   PCP: Tuan Trejo MD   Note Started: 12:24 PM EDT 9/7/23          CHIEF COMPLAINT     Chief Complaint   Patient presents with    Altered Mental Status     Pt to ED for sudden on set on confusion, pt states she was unable to read a clock, turn the lights off etc. Pt states she is having blurry vision that started 3x weeks ago. Pt states she has cataracts. Pt states she is having tingling in her left hand. Pt states its improving. HISTORY OF PRESENT ILLNESS:   History from : Patient   Limitations to history : None     Roula Light is a 66 y.o. female who presents complaining of the visual disturbance and the left hand numbness. The patient states she got up this morning little later than normal.  She had breakfast at 7:30 AM.  She states that she was at her baseline at that time. Around 11:00 am today her  came back from the grocery store and she could not see his face and at that time she noticed some left hand numbness. It involves her entire left hand now it is just pinky of her left hand. No speech abnormality. No confusion. No falls. No trauma. She denies any history of blood thinner use. Nursing Notes were all reviewed and agreed with, or any disagreements were addressed in the HPI. Not disabling    REVIEW OF SYSTEMS :    Review of Systems   Constitutional:  Negative for fever. HENT:  Negative for rhinorrhea and sore throat. Eyes:  Positive for visual disturbance. Negative for redness. Respiratory:  Negative for shortness of breath. Cardiovascular:  Negative for chest pain. Gastrointestinal:  Negative for abdominal pain. Genitourinary:  Negative for flank pain. Neurological:  Positive for numbness. Negative for headaches.

## 2023-09-07 NOTE — ED NOTES
Provider was updated about blood pressure, no new medications ordered at this time      Nader Flynn RN  09/07/23 7351

## 2023-09-07 NOTE — PROGRESS NOTES
4 Eyes Skin Assessment     NAME:  Liberty Goldberg  YOB: 1945  MEDICAL RECORD NUMBER:  [id number]    The patient is being assessed for  Admission    I agree that at least one RN has performed a thorough Head to Toe Skin Assessment on the patient. ALL assessment sites listed below have been assessed. Areas assessed by both nurses:    Head, Face, Ears, Shoulders, Back, Chest, Arms, Elbows, Hands, Sacrum. Buttock, Coccyx, Ischium, Legs. Feet and Heels, and Under Medical Devices         Does the Patient have a Wound?  No noted wound(s)       Dominguez Prevention initiated by RN: No  Wound Care Orders initiated by RN: No    Pressure Injury (Stage 3,4, Unstageable, DTI, NWPT, and Complex wounds) if present, place Wound referral order by RN under : No    New Ostomies, if present place, Ostomy referral order under : No     Nurse 1 eSignature: Electronically signed by Issa Chen RN on 9/7/23 at 4:56 PM EDT    **SHARE this note so that the co-signing nurse can place an eSignature**    Nurse 2 eSignature: {Esignature:[id number]}

## 2023-09-07 NOTE — PLAN OF CARE
TODAY'S DATE:  9/7/2023      Current NIHSS 0      Discussed personal risk factors for Stroke/TIA with patient/family, and ways to reduce the risk for a recurrent stroke. Patient's personal risk factors which were identified are:   []   Alcohol Abuse: check with your physician before any alcohol consumption. []   Atrial fibrillation: may cause blood clots  []   Drug Abuse: Seek help, talk with your doctor  []   Clotting Disorder  []   Diabetes  []   Family history of stroke or heart disease  [x]   High Blood Pressure/Hypertension: work with your physician  []   High cholesterol: monitor cholesterol levels with your physician  []   Overweight/Obesity: work with your physician for your ideal body weight  []   Physical Inactivity: get regular exercise as directed by your physician  []   Personal history of previous TIA or stroke  []   Poor Diet: decrease salt (sodium) in your diet, follow diet directed by physician  []   Smoking: stop smoking      Reviewed the Following Education with Patient and/or Family:   - Signs and Symptoms of Stroke  - Personal risk factors   - How to activate EMS (911)   - Importance of Follow Up Appointments at Discharge   - Importance of Compliance with Medications Prescribed at Discharge  - Available community resources and stroke advocacy groups if needed    Patient and/or family member: verbalized understanding. Stroke Education booklet given to patient/family (or verified, if given already), which reviews above information.  No, unit out of stock         Electronically signed by Irving Anaya RN on 9/7/2023 at 6:54 PM

## 2023-09-07 NOTE — H&P
normal limits size. Calcified right paratracheal lymph nodes similar to prior examination. Left upper lobe calcified granulomas again identified. Minimal bibasilar opacities most consistent with atelectasis. No consolidation, pleural effusion or pneumothorax. No acute osseous abnormality. Minimal bibasilar opacities most consistent with atelectasis. Other chronic findings as above. VL DUP CAROTID BILATERAL    Result Date: 9/6/2023  Vascular Carotid Procedure -- PRELIMINARY SONOGRAPHER REPORT --   Demographics   Patient Name       Bard Li   Date of Study      09/06/2023        Gender              Female   Patient Number     [id number]        Date of Birth       1945   Visit Number       [id number]         Age                 66 year(s)   Accession Number   [id number]        Room Number         OP   Corporate ID       [id number]          Sonographer         Ramses Quesada RVT   Ordering Physician Alondra Hsu MD  Interpreting        [facility]                                       Physician           Readers  Procedure Type of Study:   Cerebral:Carotid, VL CAROTID DUPLEX BILATERAL. Tech Comments Right The right internal carotid artery reveals a <50% diameter reducing stenosis. The right vertebral artery demonstrates normal antegrade flow. The right subclavian artery is visualized and demonstrates multiphasic flow. Left The left internal carotid artery reveals a 50-69% diameter reducing stenosis by velocity criteria. The internal carotid artery percent stenosis maybe underestimated due to calcific shadowing. The left vertebral artery demonstrates normal antegrade flow. The left subclavian artery is visualized and demonstrates multiphasic flow. There were no previous studies to use for comparison. Velocities are measured in cm/s ; Diameters are measured in mm Carotid Right Measurements +---------------+----+----+-----+----+ ! Location       ! PSV ! EDV ! Angle! RI  ! CTA HEAD: ANTERIOR CIRCULATION: No significant stenosis of the intracranial internal carotid, anterior cerebral, or middle cerebral arteries. 2 mm, inferiorly directed left supraclinoid ICA aneurysm. POSTERIOR CIRCULATION: No significant stenosis of the vertebral, basilar, or posterior cerebral arteries. No aneurysm. OTHER: No dural venous sinus thrombosis on this non-dedicated study. BRAIN: No mass effect or midline shift. No extra-axial fluid collection. The gray-white differentiation is maintained. No flow limiting stenosis or occlusion within the head or neck. 2 mm, inferiorly directed left supraclinoid ICA aneurysm.        PCP: Tiarra Simpson MD    Past Medical History:        Diagnosis Date    Allergic rhinitis     Arthritis     CAD (coronary artery disease)     Fractures     Hyperlipidemia     Hypertension     Migraine headache     since 20's    Parkinson disease ([address])     Reactive airway disease 2008    Shingles 10/2019    GROIN LEFT    Sjogren's syndrome ([address]) 2012    Dr. Maria Guadalupe Fletcher       Past Surgical History:        Procedure Laterality Date    ARTHROGRAPHY Left 01/08/2020    LEFT HIP ASPIRATION WITH SYNOVASURE  CPT CODE - 37779 [facility] WITH IMPLANT Left 09/08/2021    COLONOSCOPY  09/15/2010    Dr. Gillian Barroso  12/22/2016    DENTAL SURGERY  12/04/2019    TEETH EXTRACTION    DUPUYTRENS CONTRACTURE SURGERY Left 06/17/2019    LEFT DEQUERVAIN'S RELEASE performed by Christian West MD at [address] [facility] / FINGER Left 06/17/2019    EXCISION LEFT WRIST GANGLION performed by Christian West MD at [address] [facility] Left     broke the femur    HIP SURGERY      JOINT REPLACEMENT Left 04/2018    HIP    REVISION TOTAL HIP ARTHROPLASTY Left 01/29/2020    REVISION LEFT HEMIARTHROPLASTY TO LATERAL TOTAL HIP ARTHROPLASTY WITH CELLSAVER AND FASCIAILIACA BLOCK FOR PAIN

## 2023-09-08 ENCOUNTER — APPOINTMENT (OUTPATIENT)
Dept: MRI IMAGING | Age: 78
End: 2023-09-08
Payer: MEDICARE

## 2023-09-08 VITALS
HEART RATE: 81 BPM | DIASTOLIC BLOOD PRESSURE: 77 MMHG | RESPIRATION RATE: 16 BRPM | SYSTOLIC BLOOD PRESSURE: 141 MMHG | TEMPERATURE: 97.9 F | HEIGHT: 61 IN | BODY MASS INDEX: 21.35 KG/M2 | OXYGEN SATURATION: 99 % | WEIGHT: 113.1 LBS

## 2023-09-08 PROBLEM — E44.0 MODERATE MALNUTRITION (HCC): Chronic | Status: ACTIVE | Noted: 2023-09-08

## 2023-09-08 LAB
ALBUMIN SERPL-MCNC: 3.6 G/DL (ref 3.4–5)
ANION GAP SERPL CALCULATED.3IONS-SCNC: 10 MMOL/L (ref 3–16)
BILIRUB UR QL STRIP.AUTO: NEGATIVE
BUN SERPL-MCNC: 14 MG/DL (ref 7–20)
CALCIUM SERPL-MCNC: 9.3 MG/DL (ref 8.3–10.6)
CHLORIDE SERPL-SCNC: 99 MMOL/L (ref 99–110)
CHOLEST SERPL-MCNC: 153 MG/DL (ref 0–199)
CLARITY UR: CLEAR
CO2 SERPL-SCNC: 25 MMOL/L (ref 21–32)
COLOR UR: NORMAL
CREAT SERPL-MCNC: 0.7 MG/DL (ref 0.6–1.2)
DEPRECATED RDW RBC AUTO: 14 % (ref 12.4–15.4)
EKG ATRIAL RATE: 59 BPM
EKG DIAGNOSIS: NORMAL
EKG P AXIS: 61 DEGREES
EKG P-R INTERVAL: 140 MS
EKG Q-T INTERVAL: 438 MS
EKG QRS DURATION: 78 MS
EKG QTC CALCULATION (BAZETT): 433 MS
EKG R AXIS: 44 DEGREES
EKG T AXIS: 40 DEGREES
EKG VENTRICULAR RATE: 59 BPM
GFR SERPLBLD CREATININE-BSD FMLA CKD-EPI: >60 ML/MIN/{1.73_M2}
GLUCOSE BLD-MCNC: 133 MG/DL (ref 70–99)
GLUCOSE BLD-MCNC: 87 MG/DL (ref 70–99)
GLUCOSE SERPL-MCNC: 103 MG/DL (ref 70–99)
GLUCOSE UR STRIP.AUTO-MCNC: NEGATIVE MG/DL
HCT VFR BLD AUTO: 35.8 % (ref 36–48)
HDLC SERPL-MCNC: 43 MG/DL (ref 40–60)
HGB BLD-MCNC: 12.1 G/DL (ref 12–16)
HGB UR QL STRIP.AUTO: NEGATIVE
KETONES UR STRIP.AUTO-MCNC: NEGATIVE MG/DL
LDLC SERPL CALC-MCNC: 84 MG/DL
LEUKOCYTE ESTERASE UR QL STRIP.AUTO: NEGATIVE
MCH RBC QN AUTO: 31.3 PG (ref 26–34)
MCHC RBC AUTO-ENTMCNC: 33.7 G/DL (ref 31–36)
MCV RBC AUTO: 93 FL (ref 80–100)
NITRITE UR QL STRIP.AUTO: NEGATIVE
PERFORMED ON: ABNORMAL
PERFORMED ON: NORMAL
PH UR STRIP.AUTO: 6.5 [PH] (ref 5–8)
PHOSPHATE SERPL-MCNC: 3.8 MG/DL (ref 2.5–4.9)
PLATELET # BLD AUTO: 170 K/UL (ref 135–450)
PMV BLD AUTO: 7.3 FL (ref 5–10.5)
POTASSIUM SERPL-SCNC: 4.2 MMOL/L (ref 3.5–5.1)
PROT UR STRIP.AUTO-MCNC: NEGATIVE MG/DL
RBC # BLD AUTO: 3.85 M/UL (ref 4–5.2)
SODIUM SERPL-SCNC: 134 MMOL/L (ref 136–145)
SP GR UR STRIP.AUTO: 1.01 (ref 1–1.03)
TRIGL SERPL-MCNC: 129 MG/DL (ref 0–150)
UA COMPLETE W REFLEX CULTURE PNL UR: NORMAL
UA DIPSTICK W REFLEX MICRO PNL UR: NORMAL
URN SPEC COLLECT METH UR: NORMAL
UROBILINOGEN UR STRIP-ACNC: 0.2 E.U./DL
VLDLC SERPL CALC-MCNC: 26 MG/DL
WBC # BLD AUTO: 3.7 K/UL (ref 4–11)

## 2023-09-08 PROCEDURE — 97116 GAIT TRAINING THERAPY: CPT

## 2023-09-08 PROCEDURE — 97162 PT EVAL MOD COMPLEX 30 MIN: CPT

## 2023-09-08 PROCEDURE — 80061 LIPID PANEL: CPT

## 2023-09-08 PROCEDURE — 4A03X5D MEASUREMENT OF ARTERIAL FLOW, INTRACRANIAL, EXTERNAL APPROACH: ICD-10-PCS | Performed by: INTERNAL MEDICINE

## 2023-09-08 PROCEDURE — 70551 MRI BRAIN STEM W/O DYE: CPT

## 2023-09-08 PROCEDURE — 6370000000 HC RX 637 (ALT 250 FOR IP): Performed by: NURSE PRACTITIONER

## 2023-09-08 PROCEDURE — 81003 URINALYSIS AUTO W/O SCOPE: CPT

## 2023-09-08 PROCEDURE — 93010 ELECTROCARDIOGRAM REPORT: CPT | Performed by: INTERNAL MEDICINE

## 2023-09-08 PROCEDURE — 80069 RENAL FUNCTION PANEL: CPT

## 2023-09-08 PROCEDURE — 6370000000 HC RX 637 (ALT 250 FOR IP): Performed by: INTERNAL MEDICINE

## 2023-09-08 PROCEDURE — 36415 COLL VENOUS BLD VENIPUNCTURE: CPT

## 2023-09-08 PROCEDURE — 2580000003 HC RX 258: Performed by: INTERNAL MEDICINE

## 2023-09-08 PROCEDURE — 99223 1ST HOSP IP/OBS HIGH 75: CPT | Performed by: PSYCHIATRY & NEUROLOGY

## 2023-09-08 PROCEDURE — 85027 COMPLETE CBC AUTOMATED: CPT

## 2023-09-08 PROCEDURE — 97530 THERAPEUTIC ACTIVITIES: CPT

## 2023-09-08 PROCEDURE — 97112 NEUROMUSCULAR REEDUCATION: CPT

## 2023-09-08 PROCEDURE — 6360000002 HC RX W HCPCS: Performed by: INTERNAL MEDICINE

## 2023-09-08 RX ORDER — BUTALBITAL, ACETAMINOPHEN AND CAFFEINE 50; 325; 40 MG/1; MG/1; MG/1
1 TABLET ORAL EVERY 4 HOURS PRN
Status: DISCONTINUED | OUTPATIENT
Start: 2023-09-08 | End: 2023-09-08 | Stop reason: HOSPADM

## 2023-09-08 RX ORDER — IBUPROFEN 400 MG/1
400 TABLET ORAL EVERY 6 HOURS PRN
Status: DISCONTINUED | OUTPATIENT
Start: 2023-09-08 | End: 2023-09-08 | Stop reason: HOSPADM

## 2023-09-08 RX ADMIN — IBUPROFEN 400 MG: 400 TABLET, FILM COATED ORAL at 12:28

## 2023-09-08 RX ADMIN — LOSARTAN POTASSIUM 50 MG: 25 TABLET, FILM COATED ORAL at 09:27

## 2023-09-08 RX ADMIN — ENOXAPARIN SODIUM 30 MG: 100 INJECTION SUBCUTANEOUS at 09:29

## 2023-09-08 RX ADMIN — IBUPROFEN 400 MG: 400 TABLET, FILM COATED ORAL at 04:36

## 2023-09-08 RX ADMIN — ASPIRIN 81 MG: 81 TABLET, COATED ORAL at 09:27

## 2023-09-08 RX ADMIN — BUTALBITAL, ACETAMINOPHEN, AND CAFFEINE 1 TABLET: 325; 50; 40 TABLET ORAL at 17:25

## 2023-09-08 RX ADMIN — CARBIDOPA AND LEVODOPA 1.5 TABLET: 25; 100 TABLET ORAL at 09:27

## 2023-09-08 RX ADMIN — CARBIDOPA AND LEVODOPA 1.5 TABLET: 25; 100 TABLET ORAL at 12:28

## 2023-09-08 RX ADMIN — ACETAMINOPHEN 650 MG: 325 TABLET ORAL at 09:27

## 2023-09-08 RX ADMIN — SODIUM CHLORIDE, PRESERVATIVE FREE 10 ML: 5 INJECTION INTRAVENOUS at 09:30

## 2023-09-08 RX ADMIN — ISOSORBIDE MONONITRATE 30 MG: 30 TABLET, EXTENDED RELEASE ORAL at 09:27

## 2023-09-08 RX ADMIN — CARBIDOPA AND LEVODOPA 1.5 TABLET: 25; 100 TABLET ORAL at 17:25

## 2023-09-08 RX ADMIN — METOPROLOL TARTRATE 25 MG: 25 TABLET, FILM COATED ORAL at 09:27

## 2023-09-08 ASSESSMENT — PAIN DESCRIPTION - LOCATION
LOCATION: HEAD

## 2023-09-08 ASSESSMENT — PAIN DESCRIPTION - DESCRIPTORS
DESCRIPTORS: ACHING
DESCRIPTORS: ACHING

## 2023-09-08 ASSESSMENT — PAIN SCALES - GENERAL
PAINLEVEL_OUTOF10: 1
PAINLEVEL_OUTOF10: 7
PAINLEVEL_OUTOF10: 5
PAINLEVEL_OUTOF10: 4
PAINLEVEL_OUTOF10: 5

## 2023-09-08 ASSESSMENT — PAIN - FUNCTIONAL ASSESSMENT: PAIN_FUNCTIONAL_ASSESSMENT: PREVENTS OR INTERFERES SOME ACTIVE ACTIVITIES AND ADLS

## 2023-09-08 NOTE — PLAN OF CARE
Problem: Discharge Planning  Goal: Discharge to home or other facility with appropriate resources  9/8/2023 0342 by Luis Fernando Westbrook RN  Outcome: Progressing  Flowsheets (Taken 9/7/2023 2008)  Discharge to home or other facility with appropriate resources:   Identify barriers to discharge with patient and caregiver   Identify discharge learning needs (meds, wound care, etc)   Arrange for needed discharge resources and transportation as appropriate     Problem: Pain  Goal: Verbalizes/displays adequate comfort level or baseline comfort level  9/8/2023 0342 by Luis eFrnando Westbrook RN  Outcome: Progressing     Problem: Safety - Adult  Goal: Free from fall injury  9/8/2023 0342 by Luis Fernando Westbrook RN  Outcome: Progressing     Problem: ABCDS Injury Assessment  Goal: Absence of physical injury  9/8/2023 0342 by Luis Fernando Westbrook RN  Outcome: Progressing     Problem: Neurosensory - Adult  Goal: Achieves stable or improved neurological status  9/8/2023 0342 by Luis Fernando Westbrook RN  Outcome: Progressing  Flowsheets (Taken 9/7/2023 2008)  Achieves stable or improved neurological status: Assess for and report changes in neurological status     Problem: Neurosensory - Adult  Goal: Absence of seizures  9/8/2023 0342 by Luis Fernando Westbrook RN  Outcome: Progressing  Flowsheets (Taken 9/7/2023 2008)  Absence of seizures: Monitor for seizure activity.   If seizure occurs, document type and location of movements and any associated apnea     Problem: Neurosensory - Adult  Goal: Remains free of injury related to seizures activity  9/8/2023 0342 by Luis Fernando Westbrook RN  Outcome: Progressing  Flowsheets (Taken 9/7/2023 2008)  Remains free of injury related to seizure activity: Maintain airway, patient safety  and administer oxygen as ordered     Problem: Neurosensory - Adult  Goal: Achieves maximal functionality and self care  9/8/2023 0342 by Luis Fernando Westbrook RN  Outcome: Progressing  Flowsheets (Taken 9/7/2023 2008)  Achieves maximal functionality and self care: Monitor swallowing and airway patency with patient fatigue and changes in neurological status     Problem: Respiratory - Adult  Goal: Achieves optimal ventilation and oxygenation  9/8/2023 0342 by Dejon Landeros RN  Outcome: Progressing  Flowsheets (Taken 9/7/2023 2008)  Achieves optimal ventilation and oxygenation: Assess for changes in respiratory status     Problem: Skin/Tissue Integrity - Adult  Goal: Skin integrity remains intact  9/8/2023 0342 by Dejon Landeros RN  Outcome: Progressing  Flowsheets (Taken 9/7/2023 2008)  Skin Integrity Remains Intact: Monitor for areas of redness and/or skin breakdown     Problem: Skin/Tissue Integrity - Adult  Goal: Oral mucous membranes remain intact  9/8/2023 0342 by Dejon Landeros RN  Outcome: Progressing  Flowsheets (Taken 9/7/2023 2008)  Oral Mucous Membranes Remain Intact: Assess oral mucosa and hygiene practices     Problem: Musculoskeletal - Adult  Goal: Return mobility to safest level of function  9/8/2023 0342 by Dejon Landeros RN  Outcome: Progressing  Flowsheets (Taken 9/7/2023 2008)  Return Mobility to Safest Level of Function: Assess patient stability and activity tolerance for standing, transferring and ambulating with or without assistive devices     Problem: Musculoskeletal - Adult  Goal: Maintain proper alignment of affected body part  9/8/2023 0342 by Dejon Landeros RN  Outcome: Progressing  Flowsheets (Taken 9/7/2023 2008)  Maintain proper alignment of affected body part: Support and protect limb and body alignment per provider's orders      TODAY'S DATE:  9/8/2023      Current NIHSS 0      Discussed personal risk factors for Stroke/TIA with patient/family, and ways to reduce the risk for a recurrent stroke. Patient's personal risk factors which were identified are:   []   Alcohol Abuse: check with your physician before any alcohol consumption.    []   Atrial fibrillation: may cause blood clots  []   Drug Abuse: Seek help, talk with your doctor  []   Clotting Disorder  []

## 2023-09-08 NOTE — PROGRESS NOTES
9554- Patient transferred to room 111, transported via wheelchair from [facility]. Report received from [address] [facility] Patient belongings will be brought down from room 367.    0933- Morning assessment and vital signs complete. Patient given scheduled medications as prescribed, including tylenol per request for her head ache. Patient is alert and oriented x4. Stand by assistance provided to assist patient to the bathroom. Call light is within reach, bed alarm is on.      1100-  at bedside. New orders noted. 1130- OT/PT at bedside. Patient ambulating in hallway. 1200- Urine sample collected and taken to lab. 1430- Neurology MD at bedside.

## 2023-09-08 NOTE — PROGRESS NOTES
Pt d/c'd home. Removed peripheral IV and stopped bleeding. Catheter intact. Pt tolerated well. No redness noted at site. Notified [facility] and removed tele box. Reviewed d/c instructions, home meds, and  f/u information utilizing teach-back method. No scripts given to patient. Patient verbalized understanding. Patient transported to personal vehicle.  Electronically signed by Lenore Diaz RN on 9/8/23 at 5:51 PM EDT

## 2023-09-08 NOTE — PROGRESS NOTES
Comprehensive Nutrition Assessment    Type and Reason for Visit:  Initial, Positive Nutrition Screen (MST=3: weight loss, decreased appetite)    Nutrition Recommendations/Plan:   Continue easy to chew diet   Modify ONS to Ensure Clear BID - monitor acceptance   Encourage PO and protein intakes   Encourage 5-6 small meals daily at home   Monitor nutrition adequacy, pertinent labs, bowel habits, wt changes, and clinical progress     Malnutrition Assessment:  Malnutrition Status: Moderate malnutrition (09/08/23 1206)    Context:  Chronic Illness     Findings of the 6 clinical characteristics of malnutrition:  Energy Intake:  75% or less estimated energy requirements for 1 month or longer  Body Fat Loss:  Mild body fat loss Orbital   Muscle Mass Loss:  Mild muscle mass loss Temples (temporalis), Clavicles (pectoralis & deltoids)    Nutrition Assessment:    Pt admitted with TIA and AMS. Hx of CAD, HLD, HTN, and Parkinson's. Pt nutritionally compromised AEB weight loss and decreased PO intakes. Pt reports difficulty chewing d/t recent implants and dentures not fitting correctly on the top. States dental appointment on Monday. C/o dry mouth. Reports early satiety with meals. Eating ~4 small meals daily, encouraged 5-6 as feasible to promote PO and weight maintenance/gain. Drinks Premier Protein at home, unfavorable to Chu Shu. Agreeable to trial Ensure Clears, RD to add. Declined diet education on high protein, high calorie diet. No further nutrition questions or concerns at this time. Will monitor. Nutrition Related Findings:    Na 134. Upper dentures. Dry mouth.  Wound Type: None       Current Nutrition Intake & Therapies:    Average Meal Intake: Unable to assess  Average Supplements Intake: 0%  ADULT DIET; Easy to Chew  ADULT ORAL NUTRITION SUPPLEMENT; Breakfast, Lunch, Dinner; Standard 4 oz Oral Supplement    Anthropometric Measures:  Height: 5' 1\" (154.9 cm)  Ideal Body Weight (IBW): 105 lbs (48 kg)       Current Body Weight: 113 lb (51.3 kg), 107.6 % IBW.  Weight Source: Standing Scale  Current BMI (kg/m2): 21.4  Usual Body Weight: 125 lb (56.7 kg) (per pt report)  % Weight Change (Calculated): -9.6                    BMI Categories: Underweight (BMI less than 22) age over 72    Estimated Daily Nutrient Needs:  Energy Requirements Based On: Kcal/kg (28-33)  Weight Used for Energy Requirements: Current  Energy (kcal/day): 9925-7067 kcal  Weight Used for Protein Requirements: Current (1.2-1.4 g/kg)  Protein (g/day): 62-73 g  Method Used for Fluid Requirements: 1 ml/kcal  Fluid (ml/day): 3974-8317 mL    Nutrition Diagnosis:   Moderate malnutrition related to inadequate protein-energy intake as evidenced by poor intake prior to admission, weight loss, mild loss of subcutaneous fat, mild muscle loss    Nutrition Interventions:   Food and/or Nutrient Delivery: Continue Current Diet, Modify Oral Nutrition Supplement  Nutrition Education/Counseling: Education declined  Coordination of Nutrition Care: Continue to monitor while inpatient       Goals:     Goals: PO intake 50% or greater, prior to discharge       Nutrition Monitoring and Evaluation:   Behavioral-Environmental Outcomes: None Identified  Food/Nutrient Intake Outcomes: Food and Nutrient Intake, Supplement Intake  Physical Signs/Symptoms Outcomes: Biochemical Data, Nutrition Focused Physical Findings, Weight, Meal Time Behavior, Chewing or Swallowing    Discharge Planning:    Continue current diet, Continue Oral Nutrition Supplement     Wicho Harmon, MS, RD, LD  Contact: [phone]

## 2023-09-08 NOTE — CARE COORDINATION
Referred to patient for home care. Spoke to patient and family. Patient declines. Encouraged patient to contact PCP if she changes her  mind.   Electronically signed by MORGAN Pruitt on 9/8/2023 at 12:40 PM

## 2023-09-08 NOTE — CARE COORDINATION
Case Management Assessment  Initial Evaluation    Date/Time of Evaluation: 9/8/2023 9:15 AM  Assessment Completed by: MORGAN Rodriguez    If patient is discharged prior to next notation, then this note serves as note for discharge by case management. Patient Name: Oralia Drake                   YOB: 1945  Diagnosis: TIA (transient ischemic attack) [G45.9]  Visual disturbance [H53.9]  Hand numbness [R20.0]                   Date / Time: 9/7/2023 12:10 PM    Patient Admission Status: Inpatient   Readmission Risk (Low < 19, Mod (19-27), High > 27): Readmission Risk Score: 7.7    Current PCP: Luis Armando Iglesias MD  PCP verified by CM? (P) Yes    Chart Reviewed: Yes      History Provided by: (P) Patient  Patient Orientation: (P) Alert and Oriented    Patient Cognition: (P) Alert    Hospitalization in the last 30 days (Readmission):  No    If yes, Readmission Assessment in CM Navigator will be completed.     Advance Directives:      Code Status: Full Code   Patient's Primary Decision Maker is: (P) Legal Next of Kin    Primary Decision MakerLydiavni Parra Spouse - [phone]    Secondary Decision Maker: Monica Tang  Child - [phone]    Discharge Planning:    Patient lives with: Spouse/Significant Other Type of Home: House  Primary Care Giver: (P) Self  Patient Support Systems include: Spouse/Significant Other, Children   Current Financial resources: (P) None  Current community resources: (P) ECF/Home Care  Current services prior to admission: (P) Durable Medical Equipment            Current DME: (P) Cane, Walker            Type of Home Care services:  None    ADLS  Prior functional level: (P) Independent in ADLs/IADLs  Current functional level: (P) Independent in ADLs/IADLs    PT AM-PAC:   /24  OT AM-PAC:   /24    Family can provide assistance at DC: (P) Yes  Would you like Case Management to discuss the discharge plan with any other family members/significant others, and if so,

## 2023-09-08 NOTE — DISCHARGE INSTRUCTIONS
Can resume home blood pressure medications  Blood sugar monitoring on insulin sliding scale  Follow-up with [facility] outpatient for her small aneurysm.    Monitor blood pressure and blood sugar closely at home Writer called patient to provide status update. Writer advised patient that writer spoke with  regarding record request.  Unique Lanza gave patient direct contact information and advised patient that Unique Lanza will call him after the records are faxed.

## 2023-09-08 NOTE — PROGRESS NOTES
Physical Therapy  Facility/Department: [address] [place] [address] REMOTE TELEMETRY  Physical Therapy Initial Assessment    Name: Korin Gonzalez  : 1945  MRN: [id number]  Date of Service: 2023    Discharge Recommendations:  24 hour supervision or assist, Home with Home health PT   PT Equipment Recommendations  Equipment Needed: No      Patient Diagnosis(es): The primary encounter diagnosis was Visual disturbance. A diagnosis of Hand numbness was also pertinent to this visit. Past Medical History:  has a past medical history of Allergic rhinitis, Arthritis, CAD (coronary artery disease), Fractures, Hyperlipidemia, Hypertension, Migraine headache, Parkinson disease ([address]), Reactive airway disease, Shingles, and Sjogren's syndrome ([address] Past Surgical History:  has a past surgical history that includes Colonoscopy (09/15/2010); Tubal ligation (); Hip fracture surgery (Left); sinus surgery (2015); Coronary angioplasty (2016); dupuytrens contracture surgery (Left, 2019); EXCISION LESION HAND / FINGER (Left, 2019); hip surgery; Breast surgery (Left, ); joint replacement (Left, 2018); Dental surgery (2019); arthrography (Left, 2020); Revision total hip arthroplasty (Left, 2020); and Cataract removal with implant (Left, 2021). Assessment   Body Structures, Functions, Activity Limitations Requiring Skilled Therapeutic Intervention: Decreased functional mobility ; Decreased endurance;Decreased safe awareness;Decreased balance;Decreased high-level IADLs;Decreased vision/visual deficit; Increased pain;Decreased coordination;Decreased posture  Assessment: Pt is 67 y/o F who presents with AMS and blurry vision progressing for the past 3 weeks. Pt also experiencing change in sensation in LUE. Pt reports PLOF req no assistance for mobility and use of quad cane. Pt presents slightly below baseline this date in that she req grossly CGA for OOB mobility with use of quad cane.  Pt

## 2023-09-08 NOTE — PLAN OF CARE
Problem: Discharge Planning  Goal: Discharge to home or other facility with appropriate resources  9/8/2023 1020 by Jorge Lackey RN  Outcome: Progressing  9/8/2023 0342 by Niranjan Salazar RN  Outcome: Progressing  Flowsheets (Taken 9/7/2023 2008)  Discharge to home or other facility with appropriate resources:   Identify barriers to discharge with patient and caregiver   Identify discharge learning needs (meds, wound care, etc)   Arrange for needed discharge resources and transportation as appropriate

## 2023-09-11 ENCOUNTER — CARE COORDINATION (OUTPATIENT)
Dept: CASE MANAGEMENT | Age: 78
End: 2023-09-11

## 2023-09-11 ENCOUNTER — PATIENT MESSAGE (OUTPATIENT)
Dept: FAMILY MEDICINE CLINIC | Age: 78
End: 2023-09-11

## 2023-09-11 DIAGNOSIS — G45.9 TIA (TRANSIENT ISCHEMIC ATTACK): Primary | ICD-10-CM

## 2023-09-11 PROCEDURE — 1111F DSCHRG MED/CURRENT MED MERGE: CPT | Performed by: FAMILY MEDICINE

## 2023-09-11 RX ORDER — BUTALBITAL, ACETAMINOPHEN AND CAFFEINE 50; 325; 40 MG/1; MG/1; MG/1
1 TABLET ORAL EVERY 6 HOURS PRN
Qty: 20 TABLET | Refills: 0 | Status: SHIPPED | OUTPATIENT
Start: 2023-09-11 | End: 2023-09-12 | Stop reason: SDUPTHER

## 2023-09-11 NOTE — CARE COORDINATION
[facility] Care Transitions Initial Follow Up Call    Call within 2 business days of discharge: Yes      Patient: Kenneth Ravi Patient : 1945   MRN: [id number]  Reason for Admission: AMS  Discharge Date: 23 RARS: Readmission Risk Score: 8.4      Last Discharge [address] [facility] [facility]       Date Complaint Diagnosis Description Type Department Provider    23 Altered Mental Status Visual disturbance . .. ED to Hosp-Admission (Discharged) (ADMITTED) Katia Boss MD; Tennille Logan . .. Spoke to patient's , Lilli Breen that stated patient was \"heading to the dentist\" and request a call back later in the day.  CTN rescheduled call per request.    Care Transitions 24 Hour Call    Care Transitions Interventions                                   Follow Up  Future Appointments   Date Time Provider [address]   2023  2:15 PM Manuel Morgan MD P CLER CAR YNES Luque RN

## 2023-09-11 NOTE — TELEPHONE ENCOUNTER
From: Brianna Francis  To: Dr. Harden Bene: 9/11/2023 8:07 AM EDT  Subject: Fijacintaet    Good morning,    Last Thursday my mom was admitted to [facility] for a possible stroke. She had changes in her vision in which she couldn't recognize my dad's face & she couldn't read a clock. Her left hand was numb, she was dizzy, had a bad headache, and felt very confused. Her CT & MRI ruled out a stroke so they think she had a TIA. She was discharged home Friday. Her CTA of the head/neck showed a 2 mm left ICA aneurysm which she needs to follow up with as an outpatient. I'm reaching out to Dr. Surekha Umanzor, [facility]. They gave her Tylenol and ibuprofen in the hospital for her really bad headache which didn't help. Just before she was discharged home the nurse gave her Fioricet which mom said did help. She's still having terrible headaches. Can you please send a script to [employer], [employer] for Filipe Eckert with refills. Thank you very much.     Erwin Ley, daughter

## 2023-09-11 NOTE — DISCHARGE SUMMARY
Lab Results   Component Value Date/Time    LABURIN  01/07/2020 03:28 PM     <10,000 CFU/ml mixed skin/urogenital alisa.  No further workup     Blood Cultures: No results found for: \"BC\"  No results found for: \"BLOODCULT2\"  Organism: No results found for: \"ORG\"    Signed:    Sharif Parham MD

## 2023-09-12 RX ORDER — BUTALBITAL, ACETAMINOPHEN AND CAFFEINE 50; 325; 40 MG/1; MG/1; MG/1
1 TABLET ORAL EVERY 6 HOURS PRN
Qty: 20 TABLET | Refills: 0 | Status: SHIPPED | OUTPATIENT
Start: 2023-09-12

## 2023-09-12 NOTE — TELEPHONE ENCOUNTER
Pt requesting refill    butalbital-acetaminophen-caffeine (FIORICET, ESGIC) -40 MG per tablet       [facility]

## 2023-09-12 NOTE — TELEPHONE ENCOUNTER
Last Office Visit  -  8/31/23  Next Office Visit  -  n/a    Last Filled  -    Last UDS -    Contract -

## 2023-09-13 ENCOUNTER — TELEPHONE (OUTPATIENT)
Dept: FAMILY MEDICINE CLINIC | Age: 78
End: 2023-09-13

## 2023-09-13 NOTE — TELEPHONE ENCOUNTER
[employer] requesting Prior Authorization Update about   butalbital-acetaminophen-caffeine (FIORICET, ESGIC) -40 MG per tablet     Please Contact before 5 days or PA will be denied per [employer] at [phone]  option 1

## 2023-09-14 NOTE — PROGRESS NOTES
Physician Progress Note      PATIENT:               Blayne Hand  CSN #:                  [id number]  :                       1945  ADMIT DATE:       2023 12:10 PM  [address] [place] [address] DATE:        2023 3:14 PM  RESPONDING  PROVIDER #:        Kendall Avila MD          QUERY TEXT:    Patient admitted with TIA. Noted to have moderate malnutrition per dietary. If   possible, please document in progress notes and discharge summary if you are   evaluating and /or treating any of the following: The medical record reflects the following:  Risk Factors: chronic illness, poor PO intake, difficulty chewing  Clinical Indicators: mild fat and muscle loss, dietary states moderate   malnutrition  Treatment: nutritional supplements, I&O's, daily weights, dietary consult,   supportive care    Thank you,  Joao Tafoya RN, CDS  [email or web address]    ASPEN Criteria:    https://aspenjournals. onlinelibrary. haddad. com/doi/full/10.1177/040111467758254  5  Options provided:  -- Protein calorie malnutrition moderate  -- Other - I will add my own diagnosis  -- Disagree - Not applicable / Not valid  -- Disagree - Clinically unable to determine / Unknown  -- Refer to Clinical Documentation Reviewer    PROVIDER RESPONSE TEXT:    This patient has moderate protein calorie malnutrition.     Query created by: Joao Tafoya on 2023 8:46 AM      Electronically signed by:  Kendall Avila MD 2023 7:40 AM

## 2023-09-14 NOTE — TELEPHONE ENCOUNTER
June Cruz states that she was able to pick it up but was told by the insurance that you missed questions on the form. She would like to keep this on hand for her HA from the aneurism.  So she would like the form to be completed and resent

## 2023-09-19 ENCOUNTER — CARE COORDINATION (OUTPATIENT)
Dept: CASE MANAGEMENT | Age: 78
End: 2023-09-19

## 2023-09-21 ENCOUNTER — CARE COORDINATION (OUTPATIENT)
Dept: CASE MANAGEMENT | Age: 78
End: 2023-09-21

## 2023-09-21 NOTE — CARE COORDINATION
[facility] Care Transitions Follow Up Call    Patient Current Location:  Home: [address] [place] [address]  [address]    Care Transition Nurse contacted the patient by telephone to follow up after admission. Verified name and  with patient as identifiers. Patient: Luz Martinez  Patient : 1945   MRN: [id number]  Reason for Admission: TIA  Discharge Date: 23 RARS: Readmission Risk Score: 8.4      Needs to be reviewed by the provider   Additional needs identified to be addressed with provider: No  none             Method of communication with provider: none. Patient answered call and verified . Patient pleasant and agreeable to transition call. Patient was seen by neurologist yesterday and stated that visit went really well. Patient was told that she did not have an aneurysm and no surgery or intervention was needed. Patient continues to have headache and scheduled to see cardiologist on Monday. Patient to check with provider about possible medication adjustments. Patient feels that headache is from one of her prescriptions. Addressed changes since last contact:  none  Discussed follow-up appointments. If no appointment was previously scheduled, appointment scheduling offered: Yes. Is follow up appointment scheduled within 7 days of discharge? No.    Follow Up  Future Appointments   Date Time Provider [address] [place] [address]   2023  2:15 PM Fidel French MD P [facility]     [facility] follow up appointment(s):     Care Transition Nurse reviewed medical action plan with patient and discussed any barriers to care and/or understanding of plan of care after discharge. Discussed appropriate site of care based on symptoms and resources available to patient including: PCP  Specialist. The patient agrees to contact the PCP office for questions related to their healthcare.      Advance Care Planning:   Advance Directives:       Code Status: Full Code   Patient's Primary Decision Maker is: (P)

## 2023-09-22 NOTE — PROGRESS NOTES
[facility] Heart Consultation Note  2023   Ref by Dr Alyssa Arias:  Ms. Stephanie Rudolph is here for follow up for HLD and Hypertension in setting of Parkinson's. C/o hospitalized recently for suspected TIA or 2 mm aneurysm, passed out yesterday, occasional CP after she sits down following exertion but not during exertion     [place]:  Admitted -23 for suspected TIA. Testing below. Today, her  and daughter are present. She presents with a cane. she reports she was sitting on the couch and she couldn't make out her husbands face, read the clock on the wall. Ring finger and pinky on left  hand were numb. She had trouble walking, felt dizzy and very confused. This listed for 2 hours and resolved when she got to emergency room. Neurologist at [facility] said she does not have a 2MM aneurysm. Yesterday 23 She was outside in the yard talking to a neighbor and she started not feeling good. She turned pale and passed out. She denied any CP before syncopal episode. She gets a little occasionally CP after walking. She is having vision problems. She forgets what she is saying in the middle of a conversation with someone often. She feels off balance and her head feels fuzzy. Patient denies current edema, sob, palpitations. Patient is taking all cardiac medications as prescribed and tolerates them well. PMH  CAD, HTN, HLD, Parkinson's and Sjogren's disease      Review of Systems:  12 point ROS negative in all areas as listed below except as in [place]  Constitutional, EENT, Cardiovascular, pulmonary, GI, , Musculoskeletal, skin, neurological, hematological, endocrine, Psychiatric      Reviewed past medical history, social, and family history.    Nonsmoker no alcohol or drug abuse  Mom alzheimers disease  Dad heart disease  of MI at 52  Past Medical History:   Diagnosis Date    Allergic rhinitis     Arthritis     CAD (coronary artery disease)     Fractures     Hyperlipidemia

## 2023-09-25 ENCOUNTER — OFFICE VISIT (OUTPATIENT)
Dept: CARDIOLOGY CLINIC | Age: 78
End: 2023-09-25
Payer: MEDICARE

## 2023-09-25 ENCOUNTER — TELEPHONE (OUTPATIENT)
Dept: CARDIOLOGY CLINIC | Age: 78
End: 2023-09-25

## 2023-09-25 VITALS
OXYGEN SATURATION: 95 % | SYSTOLIC BLOOD PRESSURE: 112 MMHG | HEART RATE: 72 BPM | BODY MASS INDEX: 21.18 KG/M2 | WEIGHT: 112.2 LBS | DIASTOLIC BLOOD PRESSURE: 70 MMHG | HEIGHT: 61 IN

## 2023-09-25 DIAGNOSIS — I10 PRIMARY HYPERTENSION: ICD-10-CM

## 2023-09-25 DIAGNOSIS — R55 SYNCOPE, UNSPECIFIED SYNCOPE TYPE: ICD-10-CM

## 2023-09-25 DIAGNOSIS — I25.110 CORONARY ARTERY DISEASE INVOLVING NATIVE CORONARY ARTERY OF NATIVE HEART WITH UNSTABLE ANGINA PECTORIS (HCC): Primary | ICD-10-CM

## 2023-09-25 DIAGNOSIS — Z79.899 MEDICATION MANAGEMENT: ICD-10-CM

## 2023-09-25 DIAGNOSIS — G20.A1 PARKINSON DISEASE: ICD-10-CM

## 2023-09-25 DIAGNOSIS — R07.9 CHEST PAIN, UNSPECIFIED TYPE: ICD-10-CM

## 2023-09-25 DIAGNOSIS — E78.2 MIXED HYPERLIPIDEMIA: ICD-10-CM

## 2023-09-25 PROCEDURE — 3074F SYST BP LT 130 MM HG: CPT | Performed by: INTERNAL MEDICINE

## 2023-09-25 PROCEDURE — 99214 OFFICE O/P EST MOD 30 MIN: CPT | Performed by: INTERNAL MEDICINE

## 2023-09-25 PROCEDURE — 3078F DIAST BP <80 MM HG: CPT | Performed by: INTERNAL MEDICINE

## 2023-09-25 PROCEDURE — 1123F ACP DISCUSS/DSCN MKR DOCD: CPT | Performed by: INTERNAL MEDICINE

## 2023-09-25 RX ORDER — AMLODIPINE BESYLATE 5 MG/1
5 TABLET ORAL DAILY
Qty: 90 TABLET | Refills: 3 | Status: SHIPPED | OUTPATIENT
Start: 2023-09-25 | End: 2023-09-25

## 2023-09-25 RX ORDER — LOVASTATIN 20 MG/1
40 TABLET ORAL NIGHTLY
Qty: 180 TABLET | Refills: 3 | Status: SHIPPED | OUTPATIENT
Start: 2023-09-25

## 2023-09-25 RX ORDER — AMLODIPINE BESYLATE 2.5 MG/1
2.5 TABLET ORAL DAILY
Qty: 90 TABLET | Refills: 3 | Status: SHIPPED | OUTPATIENT
Start: 2023-09-25

## 2023-09-25 RX ORDER — ISOSORBIDE MONONITRATE 30 MG/1
30 TABLET, EXTENDED RELEASE ORAL DAILY
Qty: 90 TABLET | Refills: 3 | Status: SHIPPED | OUTPATIENT
Start: 2023-09-25

## 2023-09-25 RX ORDER — LOSARTAN POTASSIUM 50 MG/1
50 TABLET ORAL DAILY
Qty: 90 TABLET | Refills: 3 | Status: SHIPPED | OUTPATIENT
Start: 2023-09-25

## 2023-09-25 RX ORDER — AMLODIPINE BESYLATE 5 MG/1
5 TABLET ORAL DAILY
COMMUNITY
End: 2023-09-25 | Stop reason: SDUPTHER

## 2023-09-25 RX ORDER — HYDROCHLOROTHIAZIDE 25 MG/1
TABLET ORAL
Qty: 36 TABLET | Refills: 3 | Status: SHIPPED | OUTPATIENT
Start: 2023-09-25

## 2023-09-25 NOTE — PATIENT INSTRUCTIONS
Plan:  Labs reviewed in epic and discussed with patient. Current medications reviewed. Refills given as warranted. Repeat bp 128/70 sitting. 112/70 standing. The condition that causes parkinson's can cause these changes in blood pressure. Recommend decreasing amlodipine to 2.5 mg daily.  -check you bp when you are standing up. Goal bp is 120-130  It is ok take one of your bp medications in the morning and the other one at night. Repeat blood work in September 2024  -CBC, CMP, TSH, Lipids   -you will need to be fasting for blood work  -it is ok to take your medicine with sips of water or black coffee  No cardiac testing at this time. Stay well hydrated, wear compression socks, keep blood pressure a little higher than normal to help with changing bp    Follow up with me in September, call in December to make your next appointment.

## 2023-09-25 NOTE — TELEPHONE ENCOUNTER
Pt returned phone call. She saw Dr. Abhishek Martinez from Neurology with [facility]. She did not have any testing done there. She wants us to get a copy of her visit with Dr. Raiza Fitzpatrick for [facility] to review. Pt also had testing done a A 9/7 and 9/8 that she would like for [facility] to review. Called [facility] and  with medical records to fax a release form so we can get a copy of pt records.

## 2023-09-25 NOTE — TELEPHONE ENCOUNTER
Pt called and stated that she had been seen at the [facility]. Pt provided a phone and fax number if we need to request information regarding her appt.      Phone [phone]  Fax [phone]

## 2023-09-26 NOTE — TELEPHONE ENCOUNTER
Attempted to call [facility] was too early this AM. Will try again at a later time.

## 2023-09-26 NOTE — TELEPHONE ENCOUNTER
Pt was seen in office 9/25/23 and [facility] testing was reviewed.

## 2023-09-28 ENCOUNTER — CARE COORDINATION (OUTPATIENT)
Dept: CASE MANAGEMENT | Age: 78
End: 2023-09-28

## 2023-09-28 NOTE — CARE COORDINATION
[facility] Care Transitions Follow Up Call      Patient: Lizeth Marrufo  Patient : 1945   MRN: [id number]  Reason for Admission:  TIA  Discharge Date: 23 RARS: Readmission Risk Score: 8.4  Attempted to reach patient via phone for transition call. VM left stating purpose of call along with my contact information requesting a return call. Follow Up  No future appointments.      Care Transitions Subsequent and Final Call    Subsequent and Final Calls  Care Transitions Interventions                          Other Interventions:             Jesús Allred RN

## 2023-10-04 NOTE — TELEPHONE ENCOUNTER
Keith Beth from [facility] [place] called to say that if this med is approved, it will need to be back dated to 09.11.23.      butalbital-acetaminophen-caffeine (FIORICET, ESGIC) -40 MG per tablet

## 2023-10-04 NOTE — TELEPHONE ENCOUNTER
Submitted PA for Butalbital-APAP-Caffeine -40MG tablets  Via CMM Key: [id number] STATUS: PENDING. Follow up done daily; if no response in three days we will refax for status check. If another three days goes by with no response we will call the insurance for status.

## 2023-10-05 ENCOUNTER — CARE COORDINATION (OUTPATIENT)
Dept: CASE MANAGEMENT | Age: 78
End: 2023-10-05

## 2023-10-05 NOTE — CARE COORDINATION
Care Transitions Follow Up Call    Patient: Ami Tawanda  Patient : 1945   MRN: [id number]  Reason for Admission: TIA  Discharge Date: 23 RARS: Readmission Risk Score: 8.4    Second and final attempt made to reach patient for transition call. VM left stating purpose of call along with my contact information requesting a return call. Follow Up  No future appointments.   External follow up appointment(s):      Care Transitions Subsequent and Final Call    Subsequent and Final Calls  Care Transitions Interventions                          Other Interventions:             Delmi Corrales RN

## 2023-10-09 NOTE — TELEPHONE ENCOUNTER
Called [facility] and spoke to Feli Ortiz Ref# [id number] and this medication was DENIED back in September. At this time an appeal would have to be done to get this medication. Please notify patient. Thank you.

## 2023-11-09 DIAGNOSIS — F51.01 PRIMARY INSOMNIA: ICD-10-CM

## 2023-11-09 DIAGNOSIS — R07.81 RIB PAIN: ICD-10-CM

## 2023-11-09 DIAGNOSIS — M85.80 OSTEOPENIA, UNSPECIFIED LOCATION: ICD-10-CM

## 2023-11-09 RX ORDER — ALENDRONATE SODIUM 70 MG/1
TABLET ORAL
Qty: 12 TABLET | Refills: 0 | Status: SHIPPED | OUTPATIENT
Start: 2023-11-09

## 2023-11-09 RX ORDER — TRAMADOL HYDROCHLORIDE 50 MG/1
TABLET ORAL
Qty: 28 TABLET | Refills: 0 | Status: SHIPPED | OUTPATIENT
Start: 2023-11-09 | End: 2023-12-09

## 2023-11-09 RX ORDER — MONTELUKAST SODIUM 10 MG/1
10 TABLET ORAL NIGHTLY
Qty: 30 TABLET | Refills: 0 | Status: SHIPPED | OUTPATIENT
Start: 2023-11-09

## 2023-11-09 RX ORDER — ALPRAZOLAM 1 MG/1
TABLET ORAL
Qty: 30 TABLET | Refills: 0 | Status: SHIPPED | OUTPATIENT
Start: 2023-11-09 | End: 2023-12-09

## 2023-11-09 NOTE — TELEPHONE ENCOUNTER
Last Office Visit  -  8/31/23  Next Office Visit  -  n/a    Last Filled  -  6/6/23  Last UDS -  n/a  Contract -  n/a

## 2023-11-09 NOTE — TELEPHONE ENCOUNTER
Last Office Visit  -  8/31/23  Next Office Visit  -  n/a    Last Filled  -  7/31/23  Last UDS -   Contract -

## 2023-12-11 DIAGNOSIS — F51.01 PRIMARY INSOMNIA: ICD-10-CM

## 2023-12-11 DIAGNOSIS — R07.81 RIB PAIN: ICD-10-CM

## 2023-12-11 RX ORDER — ALPRAZOLAM 1 MG/1
TABLET ORAL
Qty: 30 TABLET | Refills: 0 | Status: SHIPPED | OUTPATIENT
Start: 2023-12-11 | End: 2024-01-10

## 2023-12-11 RX ORDER — TRAMADOL HYDROCHLORIDE 50 MG/1
50 TABLET ORAL DAILY PRN
Qty: 28 TABLET | Refills: 0 | Status: SHIPPED | OUTPATIENT
Start: 2023-12-11 | End: 2024-01-10

## 2023-12-11 NOTE — TELEPHONE ENCOUNTER
Last Office Visit  -  8/31/23  Next Office Visit  -  n/a    Last Filled  -  11/9/23  Last UDS -  n/a  Contract -  n/a

## 2024-01-28 SDOH — ECONOMIC STABILITY: FOOD INSECURITY: WITHIN THE PAST 12 MONTHS, THE FOOD YOU BOUGHT JUST DIDN'T LAST AND YOU DIDN'T HAVE MONEY TO GET MORE.: NEVER TRUE

## 2024-01-28 SDOH — ECONOMIC STABILITY: INCOME INSECURITY: HOW HARD IS IT FOR YOU TO PAY FOR THE VERY BASICS LIKE FOOD, HOUSING, MEDICAL CARE, AND HEATING?: NOT VERY HARD

## 2024-01-28 SDOH — ECONOMIC STABILITY: TRANSPORTATION INSECURITY
IN THE PAST 12 MONTHS, HAS LACK OF TRANSPORTATION KEPT YOU FROM MEETINGS, WORK, OR FROM GETTING THINGS NEEDED FOR DAILY LIVING?: NO

## 2024-01-28 SDOH — ECONOMIC STABILITY: FOOD INSECURITY: WITHIN THE PAST 12 MONTHS, YOU WORRIED THAT YOUR FOOD WOULD RUN OUT BEFORE YOU GOT MONEY TO BUY MORE.: NEVER TRUE

## 2024-01-28 SDOH — ECONOMIC STABILITY: HOUSING INSECURITY
IN THE LAST 12 MONTHS, WAS THERE A TIME WHEN YOU DID NOT HAVE A STEADY PLACE TO SLEEP OR SLEPT IN A SHELTER (INCLUDING NOW)?: NO

## 2024-01-28 ASSESSMENT — PATIENT HEALTH QUESTIONNAIRE - PHQ9
SUM OF ALL RESPONSES TO PHQ QUESTIONS 1-9: 0
1. LITTLE INTEREST OR PLEASURE IN DOING THINGS: 0
SUM OF ALL RESPONSES TO PHQ QUESTIONS 1-9: 0
1. LITTLE INTEREST OR PLEASURE IN DOING THINGS: NOT AT ALL
SUM OF ALL RESPONSES TO PHQ9 QUESTIONS 1 & 2: 0
2. FEELING DOWN, DEPRESSED OR HOPELESS: 0
SUM OF ALL RESPONSES TO PHQ QUESTIONS 1-9: 0
SUM OF ALL RESPONSES TO PHQ9 QUESTIONS 1 & 2: 0
SUM OF ALL RESPONSES TO PHQ QUESTIONS 1-9: 0
2. FEELING DOWN, DEPRESSED OR HOPELESS: NOT AT ALL

## 2024-01-29 ENCOUNTER — TELEPHONE (OUTPATIENT)
Dept: FAMILY MEDICINE CLINIC | Age: 79
End: 2024-01-29

## 2024-01-29 ENCOUNTER — OFFICE VISIT (OUTPATIENT)
Dept: FAMILY MEDICINE CLINIC | Age: 79
End: 2024-01-29
Payer: COMMERCIAL

## 2024-01-29 VITALS
SYSTOLIC BLOOD PRESSURE: 136 MMHG | BODY MASS INDEX: 21.14 KG/M2 | OXYGEN SATURATION: 98 % | HEART RATE: 58 BPM | WEIGHT: 112 LBS | DIASTOLIC BLOOD PRESSURE: 82 MMHG | HEIGHT: 61 IN

## 2024-01-29 DIAGNOSIS — E53.8 B12 DEFICIENCY: ICD-10-CM

## 2024-01-29 DIAGNOSIS — G20.A2 PARKINSON'S DISEASE WITH FLUCTUATING MANIFESTATIONS, UNSPECIFIED WHETHER DYSKINESIA PRESENT: ICD-10-CM

## 2024-01-29 DIAGNOSIS — E13.8 OTHER SPECIFIED DIABETES MELLITUS WITH UNSPECIFIED COMPLICATIONS (HCC): ICD-10-CM

## 2024-01-29 DIAGNOSIS — I25.118 CORONARY ARTERY DISEASE OF NATIVE ARTERY OF NATIVE HEART WITH STABLE ANGINA PECTORIS (HCC): ICD-10-CM

## 2024-01-29 DIAGNOSIS — I77.9 BILATERAL CAROTID ARTERY DISEASE, UNSPECIFIED TYPE (HCC): ICD-10-CM

## 2024-01-29 DIAGNOSIS — M35.00 SJOGREN'S SYNDROME, WITH UNSPECIFIED ORGAN INVOLVEMENT (HCC): ICD-10-CM

## 2024-01-29 DIAGNOSIS — E44.0 MODERATE MALNUTRITION (HCC): ICD-10-CM

## 2024-01-29 DIAGNOSIS — M79.605 LEFT LEG PAIN: Primary | ICD-10-CM

## 2024-01-29 PROCEDURE — 96372 THER/PROPH/DIAG INJ SC/IM: CPT | Performed by: FAMILY MEDICINE

## 2024-01-29 PROCEDURE — 3075F SYST BP GE 130 - 139MM HG: CPT | Performed by: FAMILY MEDICINE

## 2024-01-29 PROCEDURE — 99214 OFFICE O/P EST MOD 30 MIN: CPT | Performed by: FAMILY MEDICINE

## 2024-01-29 PROCEDURE — 1123F ACP DISCUSS/DSCN MKR DOCD: CPT | Performed by: FAMILY MEDICINE

## 2024-01-29 PROCEDURE — 3079F DIAST BP 80-89 MM HG: CPT | Performed by: FAMILY MEDICINE

## 2024-01-29 RX ORDER — FLUOROMETHOLONE 0.1 %
SUSPENSION, DROPS(FINAL DOSAGE FORM)(ML) OPHTHALMIC (EYE)
COMMUNITY
Start: 2024-01-25

## 2024-01-29 RX ORDER — DOXYCYCLINE 50 MG/1
50 TABLET ORAL DAILY
COMMUNITY
Start: 2023-11-28

## 2024-01-29 RX ORDER — LIFITEGRAST 50 MG/ML
SOLUTION/ DROPS OPHTHALMIC
COMMUNITY
Start: 2023-11-28

## 2024-01-29 RX ORDER — CYANOCOBALAMIN 1000 UG/ML
1000 INJECTION, SOLUTION INTRAMUSCULAR; SUBCUTANEOUS ONCE
Status: COMPLETED | OUTPATIENT
Start: 2024-01-29 | End: 2024-01-29

## 2024-01-29 RX ADMIN — CYANOCOBALAMIN 1000 MCG: 1000 INJECTION, SOLUTION INTRAMUSCULAR; SUBCUTANEOUS at 09:41

## 2024-01-29 NOTE — PROGRESS NOTES
Macie Bingham (:  1945) is a 78 y.o. female,Established patient, here for evaluation of the following chief complaint(s):  Medication Check         ASSESSMENT/PLAN:  1. Left leg pain  -     External Referral To Orthopedic Surgery  2. Other specified diabetes mellitus with unspecified complications (HCC)  Hemoglobin A1C   Date Value Ref Range Status   2020 5.3 See comment % Final     Comment:     Comment:  Diagnosis of Diabetes: > or = 6.5%  Increased risk of diabetes (Prediabetes): 5.7-6.4%  Glycemic Control: Nonpregnant Adults: <7.0%                    Pregnant: <6.0%         Controlled.  3. Bilateral carotid artery disease, unspecified type (HCC)  Stable.  4. Moderate malnutrition (HCC)  stable  5. Sjogren's syndrome, with unspecified organ involvement (HCC)  Stable and controlled.  6. Coronary artery disease of native artery of native heart with stable angina pectoris (HCC)  Asymptomatic.  7. B12 deficiency  -     cyanocobalamin injection 1,000 mcg; 1,000 mcg, IntraMUSCular, ONCE, 1 dose, On 24 at 1000  8. Parkinson's disease with fluctuating manifestations, unspecified whether dyskinesia present  Currently tolerable.      No follow-ups on file.         Subjective   SUBJECTIVE/OBJECTIVE:  Macie Bingham is a 78 y.o. female. Patient presents with:  Medication Check      77 yo female with multiple issues:    1. Left leg pain  Patient has been having increased issues with left leg pain from hip down to knee.  Unclear as to primary joint pain.  Hurts when she ambulates and stands for long periods of times.  Makes her unstable on her feet.    2. Other specified diabetes mellitus with unspecified complications (HCC)  Patient has been following a good diet.  Thi sis usually exacerbated by steroid use.    3. Bilateral carotid artery disease, unspecified type (HCC)  Following with ultrasounds. No new neurologic symptoms    4. Moderate malnutrition (HCC)  Patient has been trying to eat

## 2024-01-29 NOTE — TELEPHONE ENCOUNTER
Pt called in today requesting an addendum to her visit today - she would like parkinson's added to her list of topics discussed.    Findings:  Flail P1 with severe eccentric MR.  See full report for details.

## 2024-01-30 DIAGNOSIS — M85.80 OSTEOPENIA, UNSPECIFIED LOCATION: ICD-10-CM

## 2024-01-30 RX ORDER — ALENDRONATE SODIUM 70 MG/1
TABLET ORAL
Qty: 12 TABLET | Refills: 0 | Status: SHIPPED | OUTPATIENT
Start: 2024-01-30

## 2024-01-30 NOTE — TELEPHONE ENCOUNTER
Last Office Visit  -  1/29/24  Next Office Visit  -  n/a    Last Filled  -  11/9/23  Last UDS -    Contract -

## 2024-02-04 DIAGNOSIS — M85.80 OSTEOPENIA, UNSPECIFIED LOCATION: ICD-10-CM

## 2024-02-05 RX ORDER — MONTELUKAST SODIUM 10 MG/1
10 TABLET ORAL NIGHTLY
Qty: 30 TABLET | Refills: 2 | Status: SHIPPED | OUTPATIENT
Start: 2024-02-05

## 2024-02-05 RX ORDER — ALENDRONATE SODIUM 70 MG/1
TABLET ORAL
Qty: 12 TABLET | Refills: 0 | Status: SHIPPED | OUTPATIENT
Start: 2024-02-05

## 2024-02-05 NOTE — TELEPHONE ENCOUNTER
Last Office Visit  -  1/29/24  Next Office Visit  -  n/a    Last Filled  -    Last UDS -    Contract -

## 2024-02-22 ENCOUNTER — NURSE ONLY (OUTPATIENT)
Dept: FAMILY MEDICINE CLINIC | Age: 79
End: 2024-02-22
Payer: COMMERCIAL

## 2024-02-22 DIAGNOSIS — E53.8 B12 DEFICIENCY: Primary | ICD-10-CM

## 2024-02-22 PROCEDURE — 96372 THER/PROPH/DIAG INJ SC/IM: CPT | Performed by: FAMILY MEDICINE

## 2024-02-22 RX ORDER — CYANOCOBALAMIN 1000 UG/ML
1000 INJECTION, SOLUTION INTRAMUSCULAR; SUBCUTANEOUS ONCE
Status: COMPLETED | OUTPATIENT
Start: 2024-02-22 | End: 2024-02-22

## 2024-02-22 RX ADMIN — CYANOCOBALAMIN 1000 MCG: 1000 INJECTION, SOLUTION INTRAMUSCULAR; SUBCUTANEOUS at 10:45

## 2024-02-22 NOTE — PROGRESS NOTES
Pt was here for nurse visit for B12.   Pt is asking how many more injections is she going to need after today? Pt wanted to also know when you will do labs and check her B12? Please advise?

## 2024-03-18 ENCOUNTER — OFFICE VISIT (OUTPATIENT)
Dept: FAMILY MEDICINE CLINIC | Age: 79
End: 2024-03-18
Payer: COMMERCIAL

## 2024-03-18 VITALS
WEIGHT: 112 LBS | DIASTOLIC BLOOD PRESSURE: 80 MMHG | SYSTOLIC BLOOD PRESSURE: 130 MMHG | BODY MASS INDEX: 21.14 KG/M2 | HEART RATE: 61 BPM | HEIGHT: 61 IN | OXYGEN SATURATION: 98 %

## 2024-03-18 DIAGNOSIS — R73.9 HYPERGLYCEMIA: ICD-10-CM

## 2024-03-18 DIAGNOSIS — G20.B2 PARKINSON'S DISEASE WITH DYSKINESIA AND FLUCTUATING MANIFESTATIONS (HCC): ICD-10-CM

## 2024-03-18 DIAGNOSIS — E53.8 B12 DEFICIENCY: Primary | ICD-10-CM

## 2024-03-18 DIAGNOSIS — E55.9 VITAMIN D DEFICIENCY: ICD-10-CM

## 2024-03-18 DIAGNOSIS — Z78.0 POSTMENOPAUSAL: ICD-10-CM

## 2024-03-18 PROBLEM — G20.A1 PARKINSON'S DISEASE (HCC): Status: ACTIVE | Noted: 2024-03-18

## 2024-03-18 PROCEDURE — 3075F SYST BP GE 130 - 139MM HG: CPT | Performed by: FAMILY MEDICINE

## 2024-03-18 PROCEDURE — 3079F DIAST BP 80-89 MM HG: CPT | Performed by: FAMILY MEDICINE

## 2024-03-18 PROCEDURE — 1123F ACP DISCUSS/DSCN MKR DOCD: CPT | Performed by: FAMILY MEDICINE

## 2024-03-18 PROCEDURE — 99214 OFFICE O/P EST MOD 30 MIN: CPT | Performed by: FAMILY MEDICINE

## 2024-03-18 RX ORDER — NITROGLYCERIN 0.4 MG/1
0.4 TABLET SUBLINGUAL EVERY 5 MIN PRN
Qty: 25 TABLET | Refills: 3 | Status: SHIPPED | OUTPATIENT
Start: 2024-03-18

## 2024-04-10 DIAGNOSIS — Z79.899 MEDICATION MANAGEMENT: ICD-10-CM

## 2024-04-10 LAB
ALBUMIN SERPL-MCNC: 3.8 G/DL (ref 3.4–5)
ALBUMIN/GLOB SERPL: 1 {RATIO} (ref 1.1–2.2)
ALP SERPL-CCNC: 81 U/L (ref 40–129)
ALT SERPL-CCNC: 14 U/L (ref 10–40)
ANION GAP SERPL CALCULATED.3IONS-SCNC: 10 MMOL/L (ref 3–16)
AST SERPL-CCNC: 32 U/L (ref 15–37)
BILIRUB SERPL-MCNC: 0.4 MG/DL (ref 0–1)
BUN SERPL-MCNC: 10 MG/DL (ref 7–20)
CALCIUM SERPL-MCNC: 9.1 MG/DL (ref 8.3–10.6)
CHLORIDE SERPL-SCNC: 101 MMOL/L (ref 99–110)
CHOLEST SERPL-MCNC: 167 MG/DL (ref 0–199)
CO2 SERPL-SCNC: 28 MMOL/L (ref 21–32)
CREAT SERPL-MCNC: 0.7 MG/DL (ref 0.6–1.2)
GFR SERPLBLD CREATININE-BSD FMLA CKD-EPI: 88 ML/MIN/{1.73_M2}
GLUCOSE SERPL-MCNC: 96 MG/DL (ref 70–99)
HDLC SERPL-MCNC: 47 MG/DL (ref 40–60)
LDLC SERPL CALC-MCNC: 97 MG/DL
POTASSIUM SERPL-SCNC: 4.4 MMOL/L (ref 3.5–5.1)
PROT SERPL-MCNC: 7.5 G/DL (ref 6.4–8.2)
SODIUM SERPL-SCNC: 139 MMOL/L (ref 136–145)
TRIGL SERPL-MCNC: 114 MG/DL (ref 0–150)
TSH SERPL DL<=0.005 MIU/L-ACNC: 2.75 UIU/ML (ref 0.27–4.2)
VLDLC SERPL CALC-MCNC: 23 MG/DL

## 2024-05-06 ENCOUNTER — TELEPHONE (OUTPATIENT)
Dept: FAMILY MEDICINE CLINIC | Age: 79
End: 2024-05-06

## 2024-05-06 NOTE — TELEPHONE ENCOUNTER
Patient contacted the office would like a return call from Lloyd, patient thinks she may have Pleurisy, advised that they need an appt. [phone]

## 2024-05-08 ENCOUNTER — OFFICE VISIT (OUTPATIENT)
Dept: FAMILY MEDICINE CLINIC | Age: 79
End: 2024-05-08
Payer: COMMERCIAL

## 2024-05-08 VITALS
HEART RATE: 68 BPM | DIASTOLIC BLOOD PRESSURE: 76 MMHG | SYSTOLIC BLOOD PRESSURE: 138 MMHG | OXYGEN SATURATION: 97 % | HEIGHT: 61 IN | BODY MASS INDEX: 21.34 KG/M2 | WEIGHT: 113 LBS

## 2024-05-08 DIAGNOSIS — R07.9 RIGHT-SIDED CHEST PAIN: Primary | ICD-10-CM

## 2024-05-08 PROCEDURE — 3075F SYST BP GE 130 - 139MM HG: CPT | Performed by: FAMILY MEDICINE

## 2024-05-08 PROCEDURE — 1123F ACP DISCUSS/DSCN MKR DOCD: CPT | Performed by: FAMILY MEDICINE

## 2024-05-08 PROCEDURE — 3078F DIAST BP <80 MM HG: CPT | Performed by: FAMILY MEDICINE

## 2024-05-08 PROCEDURE — 99214 OFFICE O/P EST MOD 30 MIN: CPT | Performed by: FAMILY MEDICINE

## 2024-05-08 RX ORDER — CLONIDINE HYDROCHLORIDE 0.1 MG/1
0.1 TABLET ORAL 3 TIMES DAILY PRN
Qty: 90 TABLET | Refills: 3 | Status: SHIPPED | OUTPATIENT
Start: 2024-05-08

## 2024-05-08 NOTE — PROGRESS NOTES
Macie Bingham (:  1945) is a 78 y.o. female,Established patient, here for evaluation of the following chief complaint(s):  Other (Possible pleurisy, Rt side chest pain, SOB upon exertion/)      Assessment & Plan   1. Right-sided chest pain  -     CT THORACIC SPINE WO CONTRAST; Future  -     CT CHEST WO CONTRAST; Future      No follow-ups on file.       Subjective   Macie Bingham is a 78 y.o. female. Patient presents with:  Other: Possible pleurisy, Rt side chest pain, SOB upon exertion      78-year-old female who with right-sided chest pain.  This has been going on for several weeks and worsening.  Patient notes that this is more painful when she takes a deep breath and when she moves.  Radiates from her right thoracic back area around to the front of her chest.  Patient notes that is very sharp in nature.  Has had no recent injuries that she is aware of.  Patient has had no cough.  She has had no production or wheezing.  The patients PMH, surgical history, family history, medications, allergies were all reviewed and updated as appropriate today.          Review of Systems       Objective   Physical Exam  Vitals and nursing note reviewed.   Constitutional:       Appearance: Normal appearance. She is well-developed.   HENT:      Head: Normocephalic and atraumatic.      Right Ear: External ear normal.      Left Ear: External ear normal.      Nose: Nose normal.   Eyes:      Conjunctiva/sclera: Conjunctivae normal.      Pupils: Pupils are equal, round, and reactive to light.   Cardiovascular:      Rate and Rhythm: Normal rate and regular rhythm.      Heart sounds: Normal heart sounds.   Pulmonary:      Effort: Pulmonary effort is normal.      Breath sounds: Normal breath sounds.   Chest:      Comments: Pain reproduced with deep respiration.  Abdominal:      General: Bowel sounds are normal.      Palpations: Abdomen is soft.   Musculoskeletal:         General: Normal range of motion.

## 2024-05-15 ENCOUNTER — HOSPITAL ENCOUNTER (OUTPATIENT)
Dept: CT IMAGING | Age: 79
Discharge: HOME | End: 2024-05-15
Attending: FAMILY MEDICINE
Payer: COMMERCIAL

## 2024-05-15 DIAGNOSIS — R07.9 RIGHT-SIDED CHEST PAIN: ICD-10-CM

## 2024-05-15 PROCEDURE — 71250 CT THORAX DX C-: CPT

## 2024-05-15 PROCEDURE — 72128 CT CHEST SPINE W/O DYE: CPT

## 2024-05-20 DIAGNOSIS — R07.81 RIB PAIN: ICD-10-CM

## 2024-05-20 RX ORDER — TRAMADOL HYDROCHLORIDE 50 MG/1
50 TABLET ORAL DAILY PRN
Qty: 28 TABLET | Refills: 0 | Status: SHIPPED | OUTPATIENT
Start: 2024-05-20 | End: 2024-06-19

## 2024-05-20 NOTE — TELEPHONE ENCOUNTER
Last Office Visit  -  5/8/24  Next Office Visit  -  6/19/24    Last Filled  -  12/11/23  Last UDS -  n/a  Contract -  n/a

## 2024-05-20 NOTE — TELEPHONE ENCOUNTER
[facility] is requesting a rx for     traMADol (ULTRAM) 50 MG tablet     Last OV 5/8/2024      Next OV 6/19/2024

## 2024-06-10 ENCOUNTER — HOSPITAL ENCOUNTER (OUTPATIENT)
Dept: WOMENS IMAGING | Age: 79
Discharge: HOME | End: 2024-06-10
Attending: FAMILY MEDICINE
Payer: COMMERCIAL

## 2024-06-10 DIAGNOSIS — Z78.0 POSTMENOPAUSAL: ICD-10-CM

## 2024-06-10 PROCEDURE — 77080 DXA BONE DENSITY AXIAL: CPT

## 2024-06-11 RX ORDER — MONTELUKAST SODIUM 10 MG/1
10 TABLET ORAL NIGHTLY
Qty: 30 TABLET | Refills: 0 | Status: SHIPPED | OUTPATIENT
Start: 2024-06-11

## 2024-06-11 NOTE — TELEPHONE ENCOUNTER
Last Office Visit  -  5/8/24  Next Office Visit  -  6/19/24    Last Filled  -  2/5/24  Last UDS -    Contract -

## 2024-06-19 ENCOUNTER — OFFICE VISIT (OUTPATIENT)
Dept: FAMILY MEDICINE CLINIC | Age: 79
End: 2024-06-19
Payer: MEDICARE

## 2024-06-19 VITALS
OXYGEN SATURATION: 97 % | HEART RATE: 54 BPM | HEIGHT: 61 IN | SYSTOLIC BLOOD PRESSURE: 160 MMHG | DIASTOLIC BLOOD PRESSURE: 90 MMHG | BODY MASS INDEX: 20.96 KG/M2 | WEIGHT: 111 LBS

## 2024-06-19 DIAGNOSIS — J84.9 ILD (INTERSTITIAL LUNG DISEASE) (HCC): Primary | ICD-10-CM

## 2024-06-19 PROCEDURE — 1123F ACP DISCUSS/DSCN MKR DOCD: CPT | Performed by: FAMILY MEDICINE

## 2024-06-19 PROCEDURE — 3075F SYST BP GE 130 - 139MM HG: CPT | Performed by: FAMILY MEDICINE

## 2024-06-19 PROCEDURE — 99214 OFFICE O/P EST MOD 30 MIN: CPT | Performed by: FAMILY MEDICINE

## 2024-06-19 PROCEDURE — 3080F DIAST BP >= 90 MM HG: CPT | Performed by: FAMILY MEDICINE

## 2024-06-19 NOTE — PROGRESS NOTES
Macie Bingham (:  1945) is a 78 y.o. female,Established patient, here for evaluation of the following chief complaint(s):  3 Month Follow-Up      Assessment & Plan   1. ILD (interstitial lung disease) ([facility])  -     Bob Joel MD, Pulmonary, [facility]      No follow-ups on file.       Subjective   Macie Bingham is a 78 y.o. female. Patient presents with:  3 Month Follow-Up      1.  Right sided chest pain  2. BP lability  Dyspnea CT scan showed:  IMPRESSION:  1. Findings favoring interstitial lung disease.  2. 3 mm right pulmonary nodule.  3. Age-indeterminate C7 compression fracture.       The patients PMH, surgical history, family history, medications, allergies were all reviewed and updated as appropriate today.          Review of Systems       Objective   Physical Exam  Vitals and nursing note reviewed.   Constitutional:       Appearance: Normal appearance. She is well-developed.   HENT:      Head: Normocephalic and atraumatic.      Right Ear: External ear normal.      Left Ear: External ear normal.      Nose: Nose normal.   Eyes:      Conjunctiva/sclera: Conjunctivae normal.      Pupils: Pupils are equal, round, and reactive to light.   Cardiovascular:      Rate and Rhythm: Normal rate and regular rhythm.      Heart sounds: Normal heart sounds.   Pulmonary:      Effort: Pulmonary effort is normal.      Breath sounds: Normal breath sounds.   Abdominal:      General: Bowel sounds are normal.      Palpations: Abdomen is soft.   Musculoskeletal:         General: Normal range of motion.      Cervical back: Normal range of motion and neck supple.   Skin:     General: Skin is dry.   Neurological:      Mental Status: She is alert and oriented to person, place, and time.      Deep Tendon Reflexes: Reflexes are normal and symmetric.   Psychiatric:         Behavior: Behavior normal.         Thought Content: Thought content normal.         Judgment: Judgment normal.

## 2024-07-01 ENCOUNTER — TELEPHONE (OUTPATIENT)
Dept: FAMILY MEDICINE CLINIC | Age: 79
End: 2024-07-01

## 2024-07-01 NOTE — TELEPHONE ENCOUNTER
Pt called in to state that she got a letter in the mail. It was a denial for her CT scan. Letter states services rendered before approval

## 2024-07-02 NOTE — TELEPHONE ENCOUNTER
Alexis's response copied from the E-mail.     I am assuming you are mentioning the Ct spine that was denied but needing a P2P.  The auth team submitted the request but [employer]esh denied it for needing a peer to peer.  The information was sent to the office on 05/17/24.    Since we are past the peer to peer window the next steps would be for the denials team to submit the medical necessity appeal to [employer].  This was done on 06/29/24.    [employer] will take up to 60 days for review.    The balance is in the insurance bucket and at the moment there is no patient liability.  If the appeal is overturned the claim will process and the patient could have responsibility for coinsurance or deductible.    If the appeal is denied the facility will write off the charges.  If the patient has any questions my recommendation is to contact customer service at [phone]    Thank You,    Pt will be informed

## 2024-07-25 ENCOUNTER — OFFICE VISIT (OUTPATIENT)
Dept: FAMILY MEDICINE CLINIC | Age: 79
End: 2024-07-25
Payer: MEDICARE

## 2024-07-25 VITALS
WEIGHT: 112.2 LBS | DIASTOLIC BLOOD PRESSURE: 70 MMHG | BODY MASS INDEX: 21.18 KG/M2 | HEART RATE: 67 BPM | HEIGHT: 61 IN | SYSTOLIC BLOOD PRESSURE: 136 MMHG | OXYGEN SATURATION: 97 %

## 2024-07-25 DIAGNOSIS — B37.0 ORAL THRUSH: Primary | ICD-10-CM

## 2024-07-25 PROCEDURE — 3075F SYST BP GE 130 - 139MM HG: CPT | Performed by: NURSE PRACTITIONER

## 2024-07-25 PROCEDURE — 1123F ACP DISCUSS/DSCN MKR DOCD: CPT | Performed by: NURSE PRACTITIONER

## 2024-07-25 PROCEDURE — 3078F DIAST BP <80 MM HG: CPT | Performed by: NURSE PRACTITIONER

## 2024-07-25 PROCEDURE — 99213 OFFICE O/P EST LOW 20 MIN: CPT | Performed by: NURSE PRACTITIONER

## 2024-07-25 RX ORDER — TRAMADOL HYDROCHLORIDE 50 MG/1
50 TABLET ORAL EVERY 6 HOURS PRN
COMMUNITY

## 2024-07-25 RX ORDER — TOBRAMYCIN AND DEXAMETHASONE 3; 1 MG/ML; MG/ML
1 SUSPENSION/ DROPS OPHTHALMIC
COMMUNITY

## 2024-07-25 ASSESSMENT — ENCOUNTER SYMPTOMS
WHEEZING: 0
RESPIRATORY NEGATIVE: 1
SORE THROAT: 1
COUGH: 0
GASTROINTESTINAL NEGATIVE: 1
SHORTNESS OF BREATH: 0

## 2024-07-25 NOTE — PROGRESS NOTES
Patient: Macie Bingham is a 78 y.o. female who presents today with the following Chief Complaint(s):  Chief Complaint   Patient presents with    Pharyngitis     X 10 days    Conjunctivitis       Assessment:  Encounter Diagnosis   Name Primary?    Oral thrush Yes       Plan:  1. Oral thrush  Treat with oral nystatin and follow up if no improvement.   - nystatin (MYCOSTATIN) 316778 UNIT/ML suspension; Take 5 mLs by mouth 4 times daily for 10 days  Dispense: 200 mL; Refill: 0      HPI  Patient presents today with concerns of a sore throat and a white coating on her tongue. She states it has been present for the past 10 days. She has tried scraping the tongue with no improvement. She has had oral thrush but it has been a long time. She does have a history of Sjogrens and has a dry mouth as a result.       Current Outpatient Medications   Medication Sig Dispense Refill    tobramycin-dexAMETHasone (TOBRADEX) 0.3-0.1 % ophthalmic suspension 1 drop every 4 hours (while awake)      traMADol (ULTRAM) 50 MG tablet Take 1 tablet by mouth every 6 hours as needed for Pain.      nystatin (MYCOSTATIN) 521145 UNIT/ML suspension Take 5 mLs by mouth 4 times daily for 10 days 200 mL 0    metoprolol tartrate (LOPRESSOR) 25 MG tablet Take 1 tablet by mouth twice daily 180 tablet 0    montelukast (SINGULAIR) 10 MG tablet Take 1 tablet by mouth nightly 30 tablet 0    cloNIDine (CATAPRES) 0.1 MG tablet Take 1 tablet by mouth 3 times daily as needed for High Blood Pressure 90 tablet 3    nitroGLYCERIN (NITROSTAT) 0.4 MG SL tablet Place 1 tablet under the tongue every 5 minutes as needed for Chest pain 25 tablet 3    alendronate (FOSAMAX) 70 MG tablet Take 1 tablet by mouth once a week 12 tablet 0    XIIDRA 5 % SOLN INSTILL 1 DROP TWICE DAILY INTO EACH EYE APPROXIMATELY 12 HOURS APART      lovastatin (MEVACOR) 20 MG tablet Take 2 tablets by mouth nightly 180 tablet 3    hydroCHLOROthiazide (HYDRODIURIL) 25 MG tablet TAKE 1/2 (ONE-HALF)

## 2024-08-06 DIAGNOSIS — B37.0 ORAL THRUSH: ICD-10-CM

## 2024-08-06 NOTE — TELEPHONE ENCOUNTER
Pt requesting refill    nystatin (MYCOSTATIN) 296815 UNIT/ML suspension     [facility]  [facility]    Pt stated she is having symptoms of thrush again. Sore throat and thick phlegm, same as before. Pt was last seen 07.25.24. Pt declined appt for today 08.06.24. Pt requesting a call back to let her know when the prescription is sent.

## 2024-08-18 DIAGNOSIS — M85.80 OSTEOPENIA, UNSPECIFIED LOCATION: ICD-10-CM

## 2024-08-18 RX ORDER — MONTELUKAST SODIUM 10 MG/1
10 TABLET ORAL NIGHTLY
Qty: 90 TABLET | Refills: 3 | Status: SHIPPED | OUTPATIENT
Start: 2024-08-18

## 2024-08-18 RX ORDER — ALENDRONATE SODIUM 70 MG/1
TABLET ORAL
Qty: 12 TABLET | Refills: 3 | Status: SHIPPED | OUTPATIENT
Start: 2024-08-18

## 2024-08-20 ENCOUNTER — TELEPHONE (OUTPATIENT)
Dept: FAMILY MEDICINE CLINIC | Age: 79
End: 2024-08-20

## 2024-08-20 DIAGNOSIS — M19.90 OSTEOARTHRITIS, UNSPECIFIED OSTEOARTHRITIS TYPE, UNSPECIFIED SITE: Primary | ICD-10-CM

## 2024-08-20 RX ORDER — TRAMADOL HYDROCHLORIDE 50 MG/1
50 TABLET ORAL EVERY 6 HOURS PRN
Qty: 120 TABLET | Refills: 0 | Status: SHIPPED | OUTPATIENT
Start: 2024-08-20 | End: 2024-09-19

## 2024-08-20 NOTE — TELEPHONE ENCOUNTER
Last Office Visit  -  7/25/24  Next Office Visit  -  n/a    Last Filled  -  5/20/24  Last UDS -  n/a  Contract -  n/a

## 2024-08-20 NOTE — TELEPHONE ENCOUNTER
[facility] req refill for patient on  Tramadol 50 mg tabs  Last visit 7/25/24  No future  Alen chavis

## 2024-09-19 ENCOUNTER — OFFICE VISIT (OUTPATIENT)
Dept: CARDIOLOGY CLINIC | Age: 79
End: 2024-09-19
Payer: MEDICARE

## 2024-09-19 VITALS
WEIGHT: 110.2 LBS | SYSTOLIC BLOOD PRESSURE: 179 MMHG | HEART RATE: 61 BPM | HEIGHT: 61 IN | BODY MASS INDEX: 20.81 KG/M2 | OXYGEN SATURATION: 94 % | DIASTOLIC BLOOD PRESSURE: 108 MMHG

## 2024-09-19 DIAGNOSIS — Z79.899 MEDICATION MANAGEMENT: ICD-10-CM

## 2024-09-19 DIAGNOSIS — E78.2 MIXED HYPERLIPIDEMIA: ICD-10-CM

## 2024-09-19 DIAGNOSIS — I10 PRIMARY HYPERTENSION: Primary | ICD-10-CM

## 2024-09-19 DIAGNOSIS — G45.9 TIA (TRANSIENT ISCHEMIC ATTACK): ICD-10-CM

## 2024-09-19 DIAGNOSIS — R42 DIZZINESS: ICD-10-CM

## 2024-09-19 DIAGNOSIS — G20.A2 PARKINSON'S DISEASE WITH FLUCTUATING MANIFESTATIONS, UNSPECIFIED WHETHER DYSKINESIA PRESENT (HCC): ICD-10-CM

## 2024-09-19 DIAGNOSIS — I77.9 BILATERAL CAROTID ARTERY DISEASE, UNSPECIFIED TYPE (HCC): ICD-10-CM

## 2024-09-19 DIAGNOSIS — I25.118 CORONARY ARTERY DISEASE OF NATIVE ARTERY OF NATIVE HEART WITH STABLE ANGINA PECTORIS (HCC): ICD-10-CM

## 2024-09-19 PROCEDURE — 1123F ACP DISCUSS/DSCN MKR DOCD: CPT | Performed by: INTERNAL MEDICINE

## 2024-09-19 PROCEDURE — 3079F DIAST BP 80-89 MM HG: CPT | Performed by: INTERNAL MEDICINE

## 2024-09-19 PROCEDURE — 93000 ELECTROCARDIOGRAM COMPLETE: CPT | Performed by: INTERNAL MEDICINE

## 2024-09-19 PROCEDURE — 3075F SYST BP GE 130 - 139MM HG: CPT | Performed by: INTERNAL MEDICINE

## 2024-09-19 PROCEDURE — 99214 OFFICE O/P EST MOD 30 MIN: CPT | Performed by: INTERNAL MEDICINE

## 2024-09-19 RX ORDER — ISOSORBIDE MONONITRATE 30 MG/1
30 TABLET, EXTENDED RELEASE ORAL DAILY
Qty: 90 TABLET | Refills: 3 | Status: SHIPPED | OUTPATIENT
Start: 2024-09-19

## 2024-09-19 RX ORDER — HYDROCHLOROTHIAZIDE 25 MG/1
TABLET ORAL
Qty: 36 TABLET | Refills: 3 | Status: SHIPPED | OUTPATIENT
Start: 2024-09-19

## 2024-09-19 RX ORDER — LOVASTATIN 20 MG
40 TABLET ORAL NIGHTLY
Qty: 180 TABLET | Refills: 3 | Status: SHIPPED | OUTPATIENT
Start: 2024-09-19

## 2024-09-19 RX ORDER — LOSARTAN POTASSIUM 50 MG/1
50 TABLET ORAL 2 TIMES DAILY
Qty: 180 TABLET | Refills: 3 | Status: SHIPPED | OUTPATIENT
Start: 2024-09-19

## 2024-09-19 RX ORDER — METOPROLOL TARTRATE 25 MG/1
25 TABLET, FILM COATED ORAL 2 TIMES DAILY
Qty: 180 TABLET | Refills: 3 | Status: SHIPPED | OUTPATIENT
Start: 2024-09-19

## 2024-10-15 ENCOUNTER — TELEPHONE (OUTPATIENT)
Dept: FAMILY MEDICINE CLINIC | Age: 79
End: 2024-10-15

## 2024-12-02 ENCOUNTER — TELEPHONE (OUTPATIENT)
Dept: FAMILY MEDICINE CLINIC | Age: 79
End: 2024-12-02

## 2024-12-02 DIAGNOSIS — M19.90 OSTEOARTHRITIS, UNSPECIFIED OSTEOARTHRITIS TYPE, UNSPECIFIED SITE: ICD-10-CM

## 2024-12-02 RX ORDER — TRAMADOL HYDROCHLORIDE 50 MG/1
50 TABLET ORAL EVERY 6 HOURS PRN
Qty: 120 TABLET | Refills: 0 | Status: SHIPPED | OUTPATIENT
Start: 2024-12-02 | End: 2025-01-01

## 2024-12-02 NOTE — TELEPHONE ENCOUNTER
Last Office Visit  -  7/25/24  Next Office Visit  -  12/12/24    Last Filled  -  8/20/24  Last UDS -  n/a  Contract -  n/a

## 2024-12-02 NOTE — TELEPHONE ENCOUNTER
[facility] req refill for patient on  Tramadol 50  mg tabs  Last visit 7/25/24  Future 12/12/24  [facility]

## 2024-12-10 SDOH — HEALTH STABILITY: PHYSICAL HEALTH: ON AVERAGE, HOW MANY DAYS PER WEEK DO YOU ENGAGE IN MODERATE TO STRENUOUS EXERCISE (LIKE A BRISK WALK)?: 7 DAYS

## 2024-12-10 SDOH — HEALTH STABILITY: PHYSICAL HEALTH: ON AVERAGE, HOW MANY MINUTES DO YOU ENGAGE IN EXERCISE AT THIS LEVEL?: PATIENT DECLINED

## 2024-12-10 ASSESSMENT — PATIENT HEALTH QUESTIONNAIRE - PHQ9
SUM OF ALL RESPONSES TO PHQ QUESTIONS 1-9: 0
2. FEELING DOWN, DEPRESSED OR HOPELESS: NOT AT ALL
SUM OF ALL RESPONSES TO PHQ QUESTIONS 1-9: 0
1. LITTLE INTEREST OR PLEASURE IN DOING THINGS: NOT AT ALL
SUM OF ALL RESPONSES TO PHQ QUESTIONS 1-9: 0
SUM OF ALL RESPONSES TO PHQ9 QUESTIONS 1 & 2: 0
SUM OF ALL RESPONSES TO PHQ QUESTIONS 1-9: 0

## 2024-12-10 ASSESSMENT — LIFESTYLE VARIABLES
HOW MANY STANDARD DRINKS CONTAINING ALCOHOL DO YOU HAVE ON A TYPICAL DAY: 0
HOW OFTEN DO YOU HAVE A DRINK CONTAINING ALCOHOL: 1
HOW MANY STANDARD DRINKS CONTAINING ALCOHOL DO YOU HAVE ON A TYPICAL DAY: PATIENT DOES NOT DRINK
HOW OFTEN DO YOU HAVE A DRINK CONTAINING ALCOHOL: NEVER
HOW OFTEN DO YOU HAVE SIX OR MORE DRINKS ON ONE OCCASION: 1

## 2024-12-12 ENCOUNTER — OFFICE VISIT (OUTPATIENT)
Dept: FAMILY MEDICINE CLINIC | Age: 79
End: 2024-12-12

## 2024-12-12 VITALS
OXYGEN SATURATION: 98 % | SYSTOLIC BLOOD PRESSURE: 160 MMHG | BODY MASS INDEX: 21.52 KG/M2 | DIASTOLIC BLOOD PRESSURE: 90 MMHG | HEART RATE: 73 BPM | HEIGHT: 61 IN | WEIGHT: 114 LBS

## 2024-12-12 DIAGNOSIS — Z00.00 WELCOME TO MEDICARE PREVENTIVE VISIT: Primary | ICD-10-CM

## 2024-12-12 RX ORDER — CYCLOSPORINE 0 G/ML
SOLUTION/ DROPS OPHTHALMIC; TOPICAL
COMMUNITY
Start: 2024-11-14

## 2024-12-12 NOTE — PATIENT INSTRUCTIONS
problems such as diabetes, high blood pressure, and high cholesterol. If you think you may have a problem with alcohol or drug use, talk to your doctor.   Medicines    Take your medicines exactly as prescribed. Call your doctor if you think you are having a problem with your medicine.     If your doctor recommends aspirin, take the amount directed each day. Make sure you take aspirin and not another kind of pain reliever, such as acetaminophen (Tylenol).   When should you call for help?   Call 911 if you have symptoms of a heart attack. These may include:    Chest pain or pressure, or a strange feeling in the chest.     Sweating.     Shortness of breath.     Pain, pressure, or a strange feeling in the back, neck, jaw, or upper belly or in one or both shoulders or arms.     Lightheadedness or sudden weakness.     A fast or irregular heartbeat.   After you call 911, the  may tell you to chew 1 adult-strength or 2 to 4 low-dose aspirin. Wait for an ambulance. Do not try to drive yourself.  Watch closely for changes in your health, and be sure to contact your doctor if you have any problems.  Where can you learn more?  Go to https://www.[employer].net/patientEd and enter F075 to learn more about \"A Healthy Heart: Care Instructions.\"  Current as of: June 24, 2023  Content Version: 14.2  © 2024 [employer].   Care instructions adapted under license by [employer]. If you have questions about a medical condition or this instruction, always ask your healthcare professional. [employer] disclaims any warranty or liability for your use of this information.      Personalized Preventive Plan for Macie Bingham - 12/12/2024  Medicare offers a range of preventive health benefits. Some of the tests and screenings are paid in full while other may be subject to a deductible, co-insurance, and/or copay.    Some of these benefits include a comprehensive review of your medical history including

## 2024-12-12 NOTE — PROGRESS NOTES
Medicare Annual Wellness Visit    Macie Bingham is here for Medicare AWV    Assessment & Plan   Welcome to Medicare preventive visit  -     Non-[facility] External Referral To Orthopedic Surgery  Recommendations for Preventive Services Due: see orders and patient instructions/AVS.  Recommended screening schedule for the next 5-10 years is provided to the patient in written form: see Patient Instructions/AVS.     No follow-ups on file.     Subjective   The following acute and/or chronic problems were also addressed today:  Patient presents for AWV today.    Patient's complete Health Risk Assessment and screening values have been reviewed and are found in Flowsheets. The following problems were reviewed today and where indicated follow up appointments were made and/or referrals ordered.    Positive Risk Factor Screenings with Interventions:    Fall Risk:  Do you feel unsteady or are you worried about falling? : (!) yes  2 or more falls in past year?: no  Fall with injury in past year?: no     Interventions:    Reviewed medications, home hazards, visual acuity, and co-morbidities that can increase risk for falls         Controlled Medication Review:    Today's Pain Level: No data recorded   Opioid Risk: (Low risk score <55) Opioid risk score: 6    Patient is low risk for opioid use disorder or overdose.    Last PDMP [place] as Reviewed:  Review User Review Instant Review Result   BENNYAYANNA CARDOZA COLTON 11/9/2023  6:00 PM     Reviewed PDMP [1]                                Objective   Vitals:    12/12/24 1413   BP: (!) 160/90   Pulse: 73   SpO2: 98%   Weight: 51.7 kg (114 lb)   Height: 1.549 m (5' 1\")      Body mass index is 21.54 kg/m².        General Appearance: alert and oriented to person, place and time, well developed and well- nourished, in no acute distress  Skin: warm and dry, no rash or erythema  Head: normocephalic and atraumatic  Eyes: pupils equal, round, and reactive to light, extraocular eye movements intact,

## 2025-01-21 ENCOUNTER — OFFICE VISIT (OUTPATIENT)
Dept: FAMILY MEDICINE CLINIC | Age: 80
End: 2025-01-21
Payer: MEDICARE

## 2025-01-21 VITALS
DIASTOLIC BLOOD PRESSURE: 80 MMHG | WEIGHT: 113 LBS | OXYGEN SATURATION: 96 % | BODY MASS INDEX: 21.34 KG/M2 | HEART RATE: 57 BPM | HEIGHT: 61 IN | SYSTOLIC BLOOD PRESSURE: 142 MMHG

## 2025-01-21 DIAGNOSIS — M35.0C SJOGREN SYNDROME WITH DENTAL INVOLVEMENT (HCC): Primary | ICD-10-CM

## 2025-01-21 PROCEDURE — 3077F SYST BP >= 140 MM HG: CPT | Performed by: NURSE PRACTITIONER

## 2025-01-21 PROCEDURE — 3079F DIAST BP 80-89 MM HG: CPT | Performed by: NURSE PRACTITIONER

## 2025-01-21 PROCEDURE — 99213 OFFICE O/P EST LOW 20 MIN: CPT | Performed by: NURSE PRACTITIONER

## 2025-01-21 PROCEDURE — 1159F MED LIST DOCD IN RCRD: CPT | Performed by: NURSE PRACTITIONER

## 2025-01-21 PROCEDURE — 1123F ACP DISCUSS/DSCN MKR DOCD: CPT | Performed by: NURSE PRACTITIONER

## 2025-01-21 RX ORDER — TRAMADOL HYDROCHLORIDE 50 MG/1
50 TABLET ORAL EVERY 6 HOURS PRN
COMMUNITY

## 2025-01-21 RX ORDER — CLOTRIMAZOLE 10 MG/1
10 LOZENGE ORAL
Qty: 50 TABLET | Refills: 0 | Status: SHIPPED | OUTPATIENT
Start: 2025-01-21 | End: 2025-01-31

## 2025-01-21 SDOH — ECONOMIC STABILITY: FOOD INSECURITY: WITHIN THE PAST 12 MONTHS, THE FOOD YOU BOUGHT JUST DIDN'T LAST AND YOU DIDN'T HAVE MONEY TO GET MORE.: NEVER TRUE

## 2025-01-21 SDOH — ECONOMIC STABILITY: FOOD INSECURITY: WITHIN THE PAST 12 MONTHS, YOU WORRIED THAT YOUR FOOD WOULD RUN OUT BEFORE YOU GOT MONEY TO BUY MORE.: NEVER TRUE

## 2025-01-21 ASSESSMENT — ENCOUNTER SYMPTOMS: GASTROINTESTINAL NEGATIVE: 1

## 2025-01-21 ASSESSMENT — PATIENT HEALTH QUESTIONNAIRE - PHQ9
SUM OF ALL RESPONSES TO PHQ9 QUESTIONS 1 & 2: 0
SUM OF ALL RESPONSES TO PHQ9 QUESTIONS 1 & 2: 0
2. FEELING DOWN, DEPRESSED OR HOPELESS: NOT AT ALL
SUM OF ALL RESPONSES TO PHQ QUESTIONS 1-9: 0
SUM OF ALL RESPONSES TO PHQ QUESTIONS 1-9: 0
1. LITTLE INTEREST OR PLEASURE IN DOING THINGS: NOT AT ALL
2. FEELING DOWN, DEPRESSED OR HOPELESS: NOT AT ALL
1. LITTLE INTEREST OR PLEASURE IN DOING THINGS: NOT AT ALL
SUM OF ALL RESPONSES TO PHQ QUESTIONS 1-9: 0
SUM OF ALL RESPONSES TO PHQ QUESTIONS 1-9: 0

## 2025-01-21 NOTE — PROGRESS NOTES
Patient: Macie Bingham is a 79 y.o. female who presents today with the following Chief Complaint(s):  Chief Complaint   Patient presents with    Discuss Medications     Discuss alternative medication for sjogrens syndrome- dry tongue       Assessment:  Encounter Diagnosis   Name Primary?    Sjogren syndrome with dental involvement (HCC) Yes       Plan:  Assessment & Plan  Sjogren syndrome with dental involvement (HCC)  Patient advised to try artificial saliva and will give referral to see rheumatology again.  There is a possibility she still has some oral thrush since she describes a cottage cheese substance on her tongue in the mornings.  Will treat with clotrimazole troches and follow-up in 2 weeks.    Orders:    Non [facility] - External Referral To Rheumatology         HPI  Patient presents today with concerns of dry mouth due to sjogrens syndrome. She was treated for oral thrush a few months ago and she thought it went away. She does admit that she wakes up every morning with a cottage cheese tongue but she states it does wipe off. The tongue is red and irritated. She states is is painful with anything she eats. She does see a dentist. She has not seen rheumatology since she was diagnosed.     Current Outpatient Medications   Medication Sig Dispense Refill    traMADol (ULTRAM) 50 MG tablet Take 1 tablet by mouth every 6 hours as needed for Pain. Max Daily Amount: 200 mg      clotrimazole (MYCELEX) 10 MG nilesh Take 1 tablet by mouth 5 times daily for 10 days 50 tablet 0    CEQUA 0.09 % SOLN INSTILL 1 DROP TWICE DAILY INTO AFFECTED EYE(S) APPROXIMATELY 12 HOURS APART (REPLACES XIDRA)      metoprolol tartrate (LOPRESSOR) 25 MG tablet Take 1 tablet by mouth 2 times daily 180 tablet 3    losartan (COZAAR) 50 MG tablet Take 1 tablet by mouth in the morning and at bedtime 180 tablet 3    lovastatin (MEVACOR) 20 MG tablet Take 2 tablets by mouth nightly 180 tablet 3    isosorbide mononitrate (IMDUR) 30 MG extended

## 2025-01-21 NOTE — ASSESSMENT & PLAN NOTE
Patient advised to try artificial saliva and will give referral to see rheumatology again.  There is a possibility she still has some oral thrush since she describes a cottage cheese substance on her tongue in the mornings.  Will treat with clotrimazole troches and follow-up in 2 weeks.    Orders:    Non [facility] - External Referral To Rheumatology

## 2025-01-28 ENCOUNTER — TELEPHONE (OUTPATIENT)
Dept: FAMILY MEDICINE CLINIC | Age: 80
End: 2025-01-28

## 2025-01-28 NOTE — TELEPHONE ENCOUNTER
Pt asked for a referral to a rheumatologist for her Sjogren syndrome.  Pt asked the referral to go to:    Dr. Blade Sparks  [address]  [place] [address] [phone]  phone  [phone]  fax

## 2025-02-04 ENCOUNTER — OFFICE VISIT (OUTPATIENT)
Dept: FAMILY MEDICINE CLINIC | Age: 80
End: 2025-02-04
Payer: MEDICARE

## 2025-02-04 VITALS
HEART RATE: 61 BPM | SYSTOLIC BLOOD PRESSURE: 140 MMHG | WEIGHT: 113.6 LBS | HEIGHT: 61 IN | OXYGEN SATURATION: 96 % | DIASTOLIC BLOOD PRESSURE: 72 MMHG | BODY MASS INDEX: 21.45 KG/M2

## 2025-02-04 DIAGNOSIS — M35.0C SJOGREN SYNDROME WITH DENTAL INVOLVEMENT (HCC): ICD-10-CM

## 2025-02-04 DIAGNOSIS — R68.2 DRY MOUTH: Primary | ICD-10-CM

## 2025-02-04 PROCEDURE — 3078F DIAST BP <80 MM HG: CPT | Performed by: NURSE PRACTITIONER

## 2025-02-04 PROCEDURE — 3077F SYST BP >= 140 MM HG: CPT | Performed by: NURSE PRACTITIONER

## 2025-02-04 PROCEDURE — 99213 OFFICE O/P EST LOW 20 MIN: CPT | Performed by: NURSE PRACTITIONER

## 2025-02-04 PROCEDURE — 1159F MED LIST DOCD IN RCRD: CPT | Performed by: NURSE PRACTITIONER

## 2025-02-04 PROCEDURE — 1123F ACP DISCUSS/DSCN MKR DOCD: CPT | Performed by: NURSE PRACTITIONER

## 2025-02-04 RX ORDER — MOXIFLOXACIN 5 MG/ML
SOLUTION/ DROPS OPHTHALMIC
COMMUNITY
Start: 2025-02-03

## 2025-02-04 RX ORDER — PILOCARPINE HYDROCHLORIDE 5 MG/1
5 TABLET, FILM COATED ORAL 3 TIMES DAILY
Qty: 90 TABLET | Refills: 0 | Status: SHIPPED | OUTPATIENT
Start: 2025-02-04

## 2025-02-04 RX ORDER — CLOTRIMAZOLE 10 MG/1
10 LOZENGE ORAL
Qty: 50 TABLET | Refills: 0 | Status: CANCELLED | OUTPATIENT
Start: 2025-02-04 | End: 2025-02-14

## 2025-02-04 ASSESSMENT — ENCOUNTER SYMPTOMS
GASTROINTESTINAL NEGATIVE: 1
RESPIRATORY NEGATIVE: 1

## 2025-02-04 NOTE — ASSESSMENT & PLAN NOTE
Start on pilocarpine and continue with artifical saliva.     Orders:    pilocarpine (SALAGEN) 5 MG tablet; Take 1 tablet by mouth 3 times daily     E-prescribing did not go through please resend script to the pharmacy.

## 2025-02-04 NOTE — PROGRESS NOTES
Patient: Macie Bingham is a 79 y.o. female who presents today with the following Chief Complaint(s):  Chief Complaint   Patient presents with    Follow-up     2 week f/u       Assessment:  Encounter Diagnoses   Name Primary?    Dry mouth Yes    Sjogren syndrome with dental involvement (HCC)        Plan:  Assessment & Plan  Dry mouth    Will start patient on the pilocarpine 3 times daily for dry mouth.  Patient will follow-up in 1 month with Dr. Arellano.  Patient will also schedule with rheumatology.  She can continue to use the artificial saliva as well.    Orders:    pilocarpine (SALAGEN) 5 MG tablet; Take 1 tablet by mouth 3 times daily    Sjogren syndrome with dental involvement (HCC)   Start on pilocarpine and continue with artifical saliva.     Orders:    pilocarpine (SALAGEN) 5 MG tablet; Take 1 tablet by mouth 3 times daily         HPI  Patient presents today to follow-up on her Sjogren's syndrome and dry mouth.  She does have a referral to see rheumatology but states she is waiting to hear back from them to see if she can schedule.  She has been trying the artificial saliva for the past month and she does feel like its helped slightly but still having irritation.  She would like to try an oral medication to see if that will help with her symptoms.    Current Outpatient Medications   Medication Sig Dispense Refill    moxifloxacin (VIGAMOX) 0.5 % ophthalmic solution       pilocarpine (SALAGEN) 5 MG tablet Take 1 tablet by mouth 3 times daily 90 tablet 0    traMADol (ULTRAM) 50 MG tablet Take 1 tablet by mouth every 6 hours as needed for Pain.      CEQUA 0.09 % SOLN INSTILL 1 DROP TWICE DAILY INTO AFFECTED EYE(S) APPROXIMATELY 12 HOURS APART (REPLACES XIDRA)      metoprolol tartrate (LOPRESSOR) 25 MG tablet Take 1 tablet by mouth 2 times daily 180 tablet 3    losartan (COZAAR) 50 MG tablet Take 1 tablet by mouth in the morning and at bedtime 180 tablet 3    lovastatin (MEVACOR) 20 MG tablet Take 2 tablets

## 2025-02-27 DIAGNOSIS — F51.01 PRIMARY INSOMNIA: ICD-10-CM

## 2025-02-27 RX ORDER — ALPRAZOLAM 1 MG/1
TABLET ORAL
Qty: 30 TABLET | Refills: 0 | Status: SHIPPED | OUTPATIENT
Start: 2025-02-27 | End: 2025-03-29

## 2025-02-27 NOTE — TELEPHONE ENCOUNTER
Last Office Visit  -  2/4/25  Next Office Visit  -  3/18/25    Last Filled  -  12/11/23  Last UDS -  n/a  Contract -  n/a

## 2025-03-12 DIAGNOSIS — M19.90 OSTEOARTHRITIS, UNSPECIFIED OSTEOARTHRITIS TYPE, UNSPECIFIED SITE: ICD-10-CM

## 2025-03-12 RX ORDER — TRAMADOL HYDROCHLORIDE 50 MG/1
50 TABLET ORAL EVERY 6 HOURS PRN
Qty: 120 TABLET | Refills: 0 | Status: SHIPPED | OUTPATIENT
Start: 2025-03-12 | End: 2025-04-11

## 2025-03-12 NOTE — TELEPHONE ENCOUNTER
Last Office Visit  -  2/4/25  Next Office Visit  -  3/18/25    Last Filled  -  12/2/24  Last UDS -  n/a  Contract -  n/a

## 2025-03-12 NOTE — TELEPHONE ENCOUNTER
Patient is requesting a rx for   traMADol (ULTRAM) 50 MG tablet  .    Last seen 02/04/2025  Next visit 03/18/2025

## 2025-03-18 ENCOUNTER — OFFICE VISIT (OUTPATIENT)
Dept: FAMILY MEDICINE CLINIC | Age: 80
End: 2025-03-18

## 2025-03-18 VITALS
OXYGEN SATURATION: 98 % | WEIGHT: 114 LBS | HEART RATE: 70 BPM | SYSTOLIC BLOOD PRESSURE: 142 MMHG | BODY MASS INDEX: 21.52 KG/M2 | DIASTOLIC BLOOD PRESSURE: 86 MMHG | HEIGHT: 61 IN

## 2025-03-18 DIAGNOSIS — G20.A2 PARKINSON'S DISEASE WITH FLUCTUATING MANIFESTATIONS, UNSPECIFIED WHETHER DYSKINESIA PRESENT (HCC): ICD-10-CM

## 2025-03-18 DIAGNOSIS — R32 URINARY INCONTINENCE, UNSPECIFIED TYPE: ICD-10-CM

## 2025-03-18 DIAGNOSIS — R68.2 DRY MOUTH: ICD-10-CM

## 2025-03-18 DIAGNOSIS — N39.41 URGE INCONTINENCE OF URINE: Primary | ICD-10-CM

## 2025-03-18 DIAGNOSIS — J84.9 ILD (INTERSTITIAL LUNG DISEASE) (HCC): ICD-10-CM

## 2025-03-18 DIAGNOSIS — M35.0C SJOGREN SYNDROME WITH DENTAL INVOLVEMENT: ICD-10-CM

## 2025-03-18 DIAGNOSIS — E13.8 OTHER SPECIFIED DIABETES MELLITUS WITH UNSPECIFIED COMPLICATIONS: ICD-10-CM

## 2025-03-18 DIAGNOSIS — F51.01 PRIMARY INSOMNIA: ICD-10-CM

## 2025-03-18 LAB
BILIRUBIN, POC: NORMAL
BLOOD URINE, POC: NORMAL
CLARITY, POC: CLEAR
COLOR, POC: YELLOW
CREAT UR-MCNC: 37.3 MG/DL (ref 28–259)
GLUCOSE URINE, POC: NORMAL MG/DL
KETONES, POC: NORMAL MG/DL
LEUKOCYTE EST, POC: NORMAL
MICROALBUMIN UR DL<=1MG/L-MCNC: <1.2 MG/DL
MICROALBUMIN/CREAT UR: NORMAL MG/G (ref 0–30)
NITRITE, POC: NORMAL
PH, POC: 7.5
PROTEIN, POC: NORMAL MG/DL
SPECIFIC GRAVITY, POC: 1.01
UROBILINOGEN, POC: 0.2 MG/DL

## 2025-03-18 RX ORDER — SUCRALFATE ORAL 1 G/10ML
1 SUSPENSION ORAL
COMMUNITY

## 2025-03-18 RX ORDER — AMLODIPINE BESYLATE 2.5 MG/1
2.5 TABLET ORAL DAILY
COMMUNITY
End: 2025-03-18 | Stop reason: SDUPTHER

## 2025-03-18 RX ORDER — MIRABEGRON 50 MG/1
50 TABLET, FILM COATED, EXTENDED RELEASE ORAL DAILY
Qty: 30 TABLET | Refills: 2 | Status: SHIPPED | OUTPATIENT
Start: 2025-03-18

## 2025-03-18 RX ORDER — LORAZEPAM 1 MG/1
1 TABLET ORAL EVERY EVENING
Qty: 30 TABLET | Refills: 0 | Status: SHIPPED | OUTPATIENT
Start: 2025-03-18 | End: 2025-04-17

## 2025-03-18 RX ORDER — PILOCARPINE HYDROCHLORIDE 5 MG/1
5 TABLET, FILM COATED ORAL 3 TIMES DAILY
Qty: 270 TABLET | Refills: 2 | Status: SHIPPED | OUTPATIENT
Start: 2025-03-18

## 2025-03-18 RX ORDER — AMLODIPINE BESYLATE 2.5 MG/1
2.5 TABLET ORAL DAILY
Qty: 90 TABLET | Refills: 3 | Status: SHIPPED | OUTPATIENT
Start: 2025-03-18

## 2025-03-18 NOTE — PROGRESS NOTES
Macie Bingham (:  1945) is a 79 y.o. female,Established patient, here for evaluation of the following chief complaint(s):  1 Month Follow-Up         Assessment & Plan  Urge incontinence of urine      Orders:    POCT Urinalysis no Micro    Other specified diabetes mellitus with unspecified complications      Orders:    Albumin/Creatinine Ratio, Urine    Urinary incontinence, unspecified type           Dry mouth      Orders:    pilocarpine (SALAGEN) 5 MG tablet; Take 1 tablet by mouth 3 times daily    Sjogren syndrome with dental involvement      Orders:    pilocarpine (SALAGEN) 5 MG tablet; Take 1 tablet by mouth 3 times daily    Primary insomnia      Orders:    LORazepam (ATIVAN) 1 MG tablet; Take 1 tablet by mouth every evening for 30 days. Max Daily Amount: 1 mg    ILD (interstitial lung disease) (HCC)   Stable, follows with specilist.         Parkinson's disease with fluctuating manifestations, unspecified whether dyskinesia present ([facility])   Monitored by specialist- no acute findings meriting change in the plan           Assessment & Plan  1. Sjogren's Syndrome.  Her Sjogren's tongue condition has shown significant improvement with the use of sucralfate. She continues to experience dry eyes and dry mouth, for which she is under the care of an ophthalmologist. She also reports hip joint involvement, which her rheumatologist attributes to her Sjogren's syndrome. Despite undergoing several blood tests, all results have been negative. She is advised to continue using Biotene to maintain oral moisture. A prescription for sucralfate has been provided.    2. Urinary Incontinence.  Her urine appears normal, ruling out infection. The incontinence is likely age-related. A prescription for Myrbetriq has been issued to help manage her bladder control. She is advised to check the cost at [employer] and discontinue if it is too expensive.    3. Fatigue.  Her fatigue may be attributed to her chronic conditions

## 2025-03-19 ENCOUNTER — RESULTS FOLLOW-UP (OUTPATIENT)
Dept: FAMILY MEDICINE CLINIC | Age: 80
End: 2025-03-19

## 2025-03-28 PROBLEM — J84.9 ILD (INTERSTITIAL LUNG DISEASE) (HCC): Status: ACTIVE | Noted: 2025-03-28

## 2025-05-08 ENCOUNTER — TELEPHONE (OUTPATIENT)
Dept: FAMILY MEDICINE CLINIC | Age: 80
End: 2025-05-08

## 2025-05-08 DIAGNOSIS — F51.01 PRIMARY INSOMNIA: Primary | ICD-10-CM

## 2025-05-08 RX ORDER — ALPRAZOLAM 0.5 MG
0.5 TABLET ORAL NIGHTLY PRN
Qty: 30 TABLET | Refills: 0 | Status: SHIPPED | OUTPATIENT
Start: 2025-05-08 | End: 2025-06-07

## 2025-05-08 NOTE — TELEPHONE ENCOUNTER
Received call from spouse with wife present. Rx for lorazepam is not helping patient sleep. She would like rx for xanax as taken in the past with new PA. Advise

## 2025-06-25 ENCOUNTER — OFFICE VISIT (OUTPATIENT)
Dept: FAMILY MEDICINE CLINIC | Age: 80
End: 2025-06-25
Payer: MEDICARE

## 2025-06-25 VITALS
SYSTOLIC BLOOD PRESSURE: 142 MMHG | HEIGHT: 61 IN | HEART RATE: 71 BPM | BODY MASS INDEX: 21.14 KG/M2 | OXYGEN SATURATION: 91 % | WEIGHT: 112 LBS | DIASTOLIC BLOOD PRESSURE: 90 MMHG

## 2025-06-25 DIAGNOSIS — M19.90 OSTEOARTHRITIS, UNSPECIFIED OSTEOARTHRITIS TYPE, UNSPECIFIED SITE: ICD-10-CM

## 2025-06-25 DIAGNOSIS — R73.9 HYPERGLYCEMIA: Primary | ICD-10-CM

## 2025-06-25 DIAGNOSIS — E78.2 MIXED HYPERLIPIDEMIA: ICD-10-CM

## 2025-06-25 DIAGNOSIS — F51.01 PRIMARY INSOMNIA: ICD-10-CM

## 2025-06-25 DIAGNOSIS — M85.80 OSTEOPENIA, UNSPECIFIED LOCATION: ICD-10-CM

## 2025-06-25 DIAGNOSIS — M35.0C SJOGREN SYNDROME WITH DENTAL INVOLVEMENT: ICD-10-CM

## 2025-06-25 DIAGNOSIS — I10 PRIMARY HYPERTENSION: ICD-10-CM

## 2025-06-25 PROCEDURE — 3077F SYST BP >= 140 MM HG: CPT | Performed by: FAMILY MEDICINE

## 2025-06-25 PROCEDURE — 99214 OFFICE O/P EST MOD 30 MIN: CPT | Performed by: FAMILY MEDICINE

## 2025-06-25 PROCEDURE — G2211 COMPLEX E/M VISIT ADD ON: HCPCS | Performed by: FAMILY MEDICINE

## 2025-06-25 PROCEDURE — 1123F ACP DISCUSS/DSCN MKR DOCD: CPT | Performed by: FAMILY MEDICINE

## 2025-06-25 PROCEDURE — 1159F MED LIST DOCD IN RCRD: CPT | Performed by: FAMILY MEDICINE

## 2025-06-25 PROCEDURE — 3080F DIAST BP >= 90 MM HG: CPT | Performed by: FAMILY MEDICINE

## 2025-06-25 RX ORDER — LOVASTATIN 20 MG/1
40 TABLET ORAL NIGHTLY
Qty: 180 TABLET | Refills: 3 | Status: SHIPPED | OUTPATIENT
Start: 2025-06-25

## 2025-06-25 RX ORDER — ALPRAZOLAM 0.5 MG
0.5 TABLET ORAL NIGHTLY PRN
Qty: 30 TABLET | Refills: 0 | Status: SHIPPED | OUTPATIENT
Start: 2025-06-25 | End: 2025-07-25

## 2025-06-25 RX ORDER — TRAMADOL HYDROCHLORIDE 50 MG/1
50 TABLET ORAL EVERY 6 HOURS PRN
Qty: 120 TABLET | Refills: 0 | Status: SHIPPED | OUTPATIENT
Start: 2025-06-25 | End: 2025-07-25

## 2025-06-25 RX ORDER — ALENDRONATE SODIUM 70 MG/1
70 TABLET ORAL WEEKLY
Qty: 12 TABLET | Refills: 3 | Status: SHIPPED | OUTPATIENT
Start: 2025-06-25

## 2025-06-25 RX ORDER — AMLODIPINE BESYLATE 2.5 MG/1
2.5 TABLET ORAL DAILY
Qty: 90 TABLET | Refills: 3 | Status: SHIPPED | OUTPATIENT
Start: 2025-06-25

## 2025-07-05 NOTE — PROGRESS NOTES
Macie Bingham (:  1945) is a 79 y.o. female,Established patient, here for evaluation of the following chief complaint(s):  6 Month Follow-Up         Assessment & Plan  Primary insomnia       Orders:    ALPRAZolam (XANAX) 0.5 MG tablet; Take 1 tablet by mouth nightly as needed for Sleep for up to 30 days. Max Daily Amount: 0.5 mg    Osteopenia, unspecified location       Orders:    alendronate (FOSAMAX) 70 MG tablet; Take 1 tablet by mouth Once a week at 5 PM    Osteoarthritis, unspecified osteoarthritis type, unspecified site       Orders:    traMADol (ULTRAM) 50 MG tablet; Take 1 tablet by mouth every 6 hours as needed for Pain for up to 30 days. Max Daily Amount: 200 mg    Hyperglycemia       Orders:    Hemoglobin A1C; Future    Primary hypertension       Orders:    Magnesium; Future    Mixed hyperlipidemia            Sjogren syndrome with dental involvement              No follow-ups on file.     Assessment & Plan  1. Blood pressure management.  - Blood pressure readings are suboptimal, with a tendency towards hypotension.  - Parkinson's disease complicates blood pressure control; medications for Parkinson's can influence blood pressure.  - Avoiding excessively low blood pressures is crucial due to adverse effects on well-being.  - Continue current regimen of amlodipine 2.5 mg and monitor blood pressure closely; consultation with Dr. Pires will be arranged.    2. Sjogren's syndrome.  - Dry eyes likely contributing to changes in lens configuration and focal length, causing visual disturbances such as vertigo.  - Advised to maintain consistent use of eye drops and ensure adequate hydration.  - Reports burning mouth and cracked tongue; previously prescribed medication for this condition.    3. Dizziness.  - Dizziness may be a symptom of Parkinson's disease; lightheadedness could be due to blood pressure fluctuations and Parkinson's medication.  - Advised to ensure adequate fluid intake and

## 2025-07-09 ENCOUNTER — PATIENT MESSAGE (OUTPATIENT)
Dept: CARDIOLOGY CLINIC | Age: 80
End: 2025-07-09

## 2025-07-09 DIAGNOSIS — R07.9 CHEST PAIN, UNSPECIFIED TYPE: Primary | ICD-10-CM

## 2025-07-16 NOTE — TELEPHONE ENCOUNTER
I feel the same as I did last year when I saw Dr. Pires. My chest feels tight especially when I walk. Dr. Pires heard a heart murmur last year. My  Artem had an echo done for his heart murmur & is having another echo soon. If you can please ask Dr Pires if he recommends an echo for me too & if so if he can please place the order. Thank you     Patient has upcoming OV with you in Sept.  She wants to know if she can get an Echo.

## 2025-07-19 ENCOUNTER — HOSPITAL ENCOUNTER (EMERGENCY)
Age: 80
Discharge: HOME | End: 2025-07-19
Attending: EMERGENCY MEDICINE
Payer: MEDICARE

## 2025-07-19 ENCOUNTER — APPOINTMENT (OUTPATIENT)
Dept: GENERAL RADIOLOGY | Age: 80
End: 2025-07-19
Payer: MEDICARE

## 2025-07-19 VITALS
OXYGEN SATURATION: 96 % | DIASTOLIC BLOOD PRESSURE: 67 MMHG | BODY MASS INDEX: 21.23 KG/M2 | TEMPERATURE: 98.1 F | WEIGHT: 115.4 LBS | RESPIRATION RATE: 16 BRPM | HEART RATE: 58 BPM | HEIGHT: 62 IN | SYSTOLIC BLOOD PRESSURE: 127 MMHG

## 2025-07-19 DIAGNOSIS — E87.1 HYPONATREMIA: ICD-10-CM

## 2025-07-19 DIAGNOSIS — R51.9 NONINTRACTABLE EPISODIC HEADACHE, UNSPECIFIED HEADACHE TYPE: ICD-10-CM

## 2025-07-19 DIAGNOSIS — R11.0 NAUSEA: Primary | ICD-10-CM

## 2025-07-19 DIAGNOSIS — R01.1 MURMUR: ICD-10-CM

## 2025-07-19 DIAGNOSIS — R79.89 ELEVATED BRAIN NATRIURETIC PEPTIDE (BNP) LEVEL: ICD-10-CM

## 2025-07-19 LAB
ALBUMIN SERPL-MCNC: 3.8 G/DL (ref 3.4–5)
ALBUMIN/GLOB SERPL: 0.9 {RATIO} (ref 1.1–2.2)
ALP SERPL-CCNC: 89 U/L (ref 40–129)
ALT SERPL-CCNC: <5 U/L (ref 10–40)
ANION GAP SERPL CALCULATED.3IONS-SCNC: 10 MMOL/L (ref 3–16)
AST SERPL-CCNC: 35 U/L (ref 15–37)
BASOPHILS # BLD: 0 K/UL (ref 0–0.2)
BASOPHILS NFR BLD: 0.7 %
BILIRUB SERPL-MCNC: 0.4 MG/DL (ref 0–1)
BILIRUB UR QL STRIP.AUTO: NEGATIVE
BUN SERPL-MCNC: 9 MG/DL (ref 7–20)
CALCIUM SERPL-MCNC: 8.5 MG/DL (ref 8.3–10.6)
CHLORIDE SERPL-SCNC: 96 MMOL/L (ref 99–110)
CLARITY UR: CLEAR
CO2 SERPL-SCNC: 23 MMOL/L (ref 21–32)
COLOR UR: YELLOW
CREAT SERPL-MCNC: 0.7 MG/DL (ref 0.6–1.2)
DEPRECATED RDW RBC AUTO: 13 % (ref 12.4–15.4)
EKG ATRIAL RATE: 61 BPM
EKG DIAGNOSIS: NORMAL
EKG P AXIS: 60 DEGREES
EKG P-R INTERVAL: 130 MS
EKG Q-T INTERVAL: 454 MS
EKG QRS DURATION: 84 MS
EKG QTC CALCULATION (BAZETT): 457 MS
EKG R AXIS: 46 DEGREES
EKG T AXIS: 55 DEGREES
EKG VENTRICULAR RATE: 61 BPM
EOSINOPHIL # BLD: 0 K/UL (ref 0–0.6)
EOSINOPHIL NFR BLD: 0.1 %
GFR SERPLBLD CREATININE-BSD FMLA CKD-EPI: 87 ML/MIN/{1.73_M2}
GLUCOSE SERPL-MCNC: 152 MG/DL (ref 70–99)
GLUCOSE UR STRIP.AUTO-MCNC: NEGATIVE MG/DL
HCT VFR BLD AUTO: 37 % (ref 36–48)
HGB BLD-MCNC: 12.7 G/DL (ref 12–16)
HGB UR QL STRIP.AUTO: NEGATIVE
KETONES UR STRIP.AUTO-MCNC: ABNORMAL MG/DL
LEUKOCYTE ESTERASE UR QL STRIP.AUTO: NEGATIVE
LIPASE SERPL-CCNC: 21 U/L (ref 13–60)
LYMPHOCYTES # BLD: 1.1 K/UL (ref 1–5.1)
LYMPHOCYTES NFR BLD: 23.1 %
MCH RBC QN AUTO: 33.2 PG (ref 26–34)
MCHC RBC AUTO-ENTMCNC: 34.3 G/DL (ref 31–36)
MCV RBC AUTO: 96.8 FL (ref 80–100)
MONOCYTES # BLD: 0.2 K/UL (ref 0–1.3)
MONOCYTES NFR BLD: 3.9 %
NEUTROPHILS # BLD: 3.5 K/UL (ref 1.7–7.7)
NEUTROPHILS NFR BLD: 72.2 %
NITRITE UR QL STRIP.AUTO: NEGATIVE
NT-PROBNP SERPL-MCNC: 2473 PG/ML (ref 0–449)
PH UR STRIP.AUTO: 8 [PH] (ref 5–8)
PLATELET # BLD AUTO: 176 K/UL (ref 135–450)
PMV BLD AUTO: 7.6 FL (ref 5–10.5)
POTASSIUM SERPL-SCNC: 4.2 MMOL/L (ref 3.5–5.1)
PROT SERPL-MCNC: 7.9 G/DL (ref 6.4–8.2)
PROT UR STRIP.AUTO-MCNC: NEGATIVE MG/DL
RBC # BLD AUTO: 3.82 M/UL (ref 4–5.2)
SODIUM SERPL-SCNC: 129 MMOL/L (ref 136–145)
SP GR UR STRIP.AUTO: 1.02 (ref 1–1.03)
TROPONIN, HIGH SENSITIVITY: 12 NG/L (ref 0–14)
TROPONIN, HIGH SENSITIVITY: 12 NG/L (ref 0–14)
UA COMPLETE W REFLEX CULTURE PNL UR: ABNORMAL
UA DIPSTICK W REFLEX MICRO PNL UR: ABNORMAL
URN SPEC COLLECT METH UR: ABNORMAL
UROBILINOGEN UR STRIP-ACNC: 0.2 E.U./DL
WBC # BLD AUTO: 4.9 K/UL (ref 4–11)

## 2025-07-19 PROCEDURE — 96374 THER/PROPH/DIAG INJ IV PUSH: CPT

## 2025-07-19 PROCEDURE — 6360000002 HC RX W HCPCS: Performed by: EMERGENCY MEDICINE

## 2025-07-19 PROCEDURE — 99285 EMERGENCY DEPT VISIT HI MDM: CPT

## 2025-07-19 PROCEDURE — 93005 ELECTROCARDIOGRAM TRACING: CPT | Performed by: EMERGENCY MEDICINE

## 2025-07-19 PROCEDURE — 71045 X-RAY EXAM CHEST 1 VIEW: CPT

## 2025-07-19 PROCEDURE — 36415 COLL VENOUS BLD VENIPUNCTURE: CPT

## 2025-07-19 PROCEDURE — 83880 ASSAY OF NATRIURETIC PEPTIDE: CPT

## 2025-07-19 PROCEDURE — 6370000000 HC RX 637 (ALT 250 FOR IP): Performed by: EMERGENCY MEDICINE

## 2025-07-19 PROCEDURE — 81003 URINALYSIS AUTO W/O SCOPE: CPT

## 2025-07-19 PROCEDURE — 2580000003 HC RX 258: Performed by: EMERGENCY MEDICINE

## 2025-07-19 PROCEDURE — 84484 ASSAY OF TROPONIN QUANT: CPT

## 2025-07-19 PROCEDURE — 93010 ELECTROCARDIOGRAM REPORT: CPT | Performed by: INTERNAL MEDICINE

## 2025-07-19 PROCEDURE — 83690 ASSAY OF LIPASE: CPT

## 2025-07-19 PROCEDURE — 80053 COMPREHEN METABOLIC PANEL: CPT

## 2025-07-19 PROCEDURE — 85025 COMPLETE CBC W/AUTO DIFF WBC: CPT

## 2025-07-19 RX ORDER — 0.9 % SODIUM CHLORIDE 0.9 %
1000 INTRAVENOUS SOLUTION INTRAVENOUS ONCE
Status: COMPLETED | OUTPATIENT
Start: 2025-07-19 | End: 2025-07-19

## 2025-07-19 RX ORDER — ONDANSETRON 2 MG/ML
4 INJECTION INTRAMUSCULAR; INTRAVENOUS
Status: DISCONTINUED | OUTPATIENT
Start: 2025-07-19 | End: 2025-07-19 | Stop reason: HOSPADM

## 2025-07-19 RX ORDER — BUTALBITAL, ACETAMINOPHEN AND CAFFEINE 50; 325; 40 MG/1; MG/1; MG/1
1 TABLET ORAL ONCE
Status: COMPLETED | OUTPATIENT
Start: 2025-07-19 | End: 2025-07-19

## 2025-07-19 RX ORDER — ONDANSETRON 4 MG/1
4 TABLET, ORALLY DISINTEGRATING ORAL 3 TIMES DAILY PRN
Qty: 15 TABLET | Refills: 0 | Status: SHIPPED | OUTPATIENT
Start: 2025-07-19 | End: 2025-07-24

## 2025-07-19 RX ORDER — ONDANSETRON 4 MG/1
4 TABLET, ORALLY DISINTEGRATING ORAL ONCE
Status: COMPLETED | OUTPATIENT
Start: 2025-07-19 | End: 2025-07-19

## 2025-07-19 RX ORDER — BUTALBITAL, ACETAMINOPHEN AND CAFFEINE 50; 325; 40 MG/1; MG/1; MG/1
1 TABLET ORAL EVERY 4 HOURS PRN
Qty: 13 TABLET | Refills: 0 | Status: SHIPPED | OUTPATIENT
Start: 2025-07-19

## 2025-07-19 RX ADMIN — BUTALBITAL, ACETAMINOPHEN AND CAFFEINE 1 TABLET: 325; 50; 40 TABLET ORAL at 16:26

## 2025-07-19 RX ADMIN — SODIUM CHLORIDE 1000 ML: 0.9 INJECTION, SOLUTION INTRAVENOUS at 14:47

## 2025-07-19 RX ADMIN — ONDANSETRON 4 MG: 2 INJECTION, SOLUTION INTRAMUSCULAR; INTRAVENOUS at 14:49

## 2025-07-19 RX ADMIN — ONDANSETRON 4 MG: 4 TABLET, ORALLY DISINTEGRATING ORAL at 17:56

## 2025-07-19 ASSESSMENT — PAIN DESCRIPTION - LOCATION: LOCATION: HEAD

## 2025-07-19 ASSESSMENT — PAIN - FUNCTIONAL ASSESSMENT: PAIN_FUNCTIONAL_ASSESSMENT: 0-10

## 2025-07-19 ASSESSMENT — LIFESTYLE VARIABLES
HOW OFTEN DO YOU HAVE A DRINK CONTAINING ALCOHOL: NEVER
HOW MANY STANDARD DRINKS CONTAINING ALCOHOL DO YOU HAVE ON A TYPICAL DAY: PATIENT DOES NOT DRINK

## 2025-07-19 ASSESSMENT — PAIN SCALES - GENERAL: PAINLEVEL_OUTOF10: 8

## 2025-07-20 NOTE — ED PROVIDER NOTES
[id number]  [facility] [address]  [phone]    Call in 2 days       DISCHARGE MEDICATIONS:   Discharge Medication List as of 7/19/2025  5:45 PM        START taking these medications    Details   ondansetron (ZOFRAN-ODT) 4 MG disintegrating tablet Take 1 tablet by mouth 3 times daily as needed for Nausea or Vomiting, Disp-15 tablet, R-0Normal      butalbital-acetaminophen-caffeine (FIORICET, ESGIC) -40 MG per tablet Take 1 tablet by mouth every 4 hours as needed for Headaches, Disp-13 tablet, R-0Normal            DISCONTINUED MEDICATIONS:   Discharge Medication List as of 7/19/2025  5:45 PM               (Please note that portions of this note were completed with a voice recognition program.  Efforts were made to edit the dictations but occasionally words are mis-transcribed.)     Skyler Magaña MD (electronically signed)                 Skyler Magaña MD  07/19/25 2893

## 2025-07-22 DIAGNOSIS — I50.9 CONGESTIVE HEART FAILURE, UNSPECIFIED HF CHRONICITY, UNSPECIFIED HEART FAILURE TYPE (HCC): ICD-10-CM

## 2025-07-22 LAB
ANION GAP SERPL CALCULATED.3IONS-SCNC: 10 MMOL/L (ref 3–16)
BUN SERPL-MCNC: 7 MG/DL (ref 7–20)
CALCIUM SERPL-MCNC: 8.7 MG/DL (ref 8.3–10.6)
CHLORIDE SERPL-SCNC: 101 MMOL/L (ref 99–110)
CO2 SERPL-SCNC: 27 MMOL/L (ref 21–32)
CREAT SERPL-MCNC: 0.7 MG/DL (ref 0.6–1.2)
GFR SERPLBLD CREATININE-BSD FMLA CKD-EPI: 87 ML/MIN/{1.73_M2}
GLUCOSE SERPL-MCNC: 102 MG/DL (ref 70–99)
NT-PROBNP SERPL-MCNC: 1590 PG/ML (ref 0–449)
POTASSIUM SERPL-SCNC: 4.2 MMOL/L (ref 3.5–5.1)
SODIUM SERPL-SCNC: 138 MMOL/L (ref 136–145)

## 2025-07-29 ENCOUNTER — HOSPITAL ENCOUNTER (OUTPATIENT)
Dept: CARDIOLOGY | Age: 80
Discharge: HOME | End: 2025-07-31
Attending: INTERNAL MEDICINE
Payer: MEDICARE

## 2025-07-29 VITALS
DIASTOLIC BLOOD PRESSURE: 67 MMHG | BODY MASS INDEX: 21.16 KG/M2 | WEIGHT: 115 LBS | SYSTOLIC BLOOD PRESSURE: 127 MMHG | HEIGHT: 62 IN

## 2025-07-29 DIAGNOSIS — R07.9 CHEST PAIN, UNSPECIFIED TYPE: ICD-10-CM

## 2025-07-29 LAB
ECHO AO ROOT DIAM: 3.1 CM
ECHO AO ROOT INDEX: 2.05 CM/M2
ECHO AV AREA PEAK VELOCITY: 1.2 CM2
ECHO AV AREA VTI: 1.2 CM2
ECHO AV AREA/BSA PEAK VELOCITY: 0.8 CM2/M2
ECHO AV AREA/BSA VTI: 0.8 CM2/M2
ECHO AV CUSP MM: 0.9 CM
ECHO AV MEAN GRADIENT: 13 MMHG
ECHO AV MEAN VELOCITY: 1.7 M/S
ECHO AV PEAK GRADIENT: 22 MMHG
ECHO AV PEAK VELOCITY: 2.3 M/S
ECHO AV VELOCITY RATIO: 0.43
ECHO AV VTI: 60.2 CM
ECHO BSA: 1.51 M2
ECHO EST RA PRESSURE: 3 MMHG
ECHO IVC EXP: 1.6 CM
ECHO LA AREA 2C: 17.5 CM2
ECHO LA AREA 4C: 17.1 CM2
ECHO LA DIAMETER INDEX: 2.58 CM/M2
ECHO LA DIAMETER: 3.9 CM
ECHO LA MAJOR AXIS: 4.9 CM
ECHO LA MINOR AXIS: 4.9 CM
ECHO LA TO AORTIC ROOT RATIO: 1.26
ECHO LA VOL BP: 49 ML (ref 22–52)
ECHO LA VOL MOD A2C: 51 ML (ref 22–52)
ECHO LA VOL MOD A4C: 48 ML (ref 22–52)
ECHO LA VOL/BSA BIPLANE: 32 ML/M2 (ref 16–34)
ECHO LA VOLUME INDEX MOD A2C: 34 ML/M2 (ref 16–34)
ECHO LA VOLUME INDEX MOD A4C: 32 ML/M2 (ref 16–34)
ECHO LV E' LATERAL VELOCITY: 14.5 CM/S
ECHO LV E' SEPTAL VELOCITY: 9.48 CM/S
ECHO LV EF PHYSICIAN: 59 %
ECHO LV EJECTION FRACTION 3D: 59 %
ECHO LV FRACTIONAL SHORTENING: 35 % (ref 28–44)
ECHO LV INTERNAL DIMENSION DIASTOLE INDEX: 3.38 CM/M2
ECHO LV INTERNAL DIMENSION DIASTOLIC: 5.1 CM (ref 3.9–5.3)
ECHO LV INTERNAL DIMENSION SYSTOLIC INDEX: 2.19 CM/M2
ECHO LV INTERNAL DIMENSION SYSTOLIC: 3.3 CM
ECHO LV ISOVOLUMETRIC RELAXATION TIME (IVRT): 99 MS
ECHO LV IVSD: 0.6 CM (ref 0.6–0.9)
ECHO LV MASS 2D: 98.3 G (ref 67–162)
ECHO LV MASS INDEX 2D: 65.1 G/M2 (ref 43–95)
ECHO LV POSTERIOR WALL DIASTOLIC: 0.6 CM (ref 0.6–0.9)
ECHO LV RELATIVE WALL THICKNESS RATIO: 0.24
ECHO LVOT AREA: 2.8 CM2
ECHO LVOT AV VTI INDEX: 0.42
ECHO LVOT DIAM: 1.9 CM
ECHO LVOT MEAN GRADIENT: 2 MMHG
ECHO LVOT PEAK GRADIENT: 4 MMHG
ECHO LVOT PEAK VELOCITY: 1 M/S
ECHO LVOT STROKE VOLUME INDEX: 47.3 ML/M2
ECHO LVOT SV: 71.4 ML
ECHO LVOT VTI: 25.2 CM
ECHO MV A VELOCITY: 0.95 M/S
ECHO MV AREA VTI: 2.6 CM2
ECHO MV E DECELERATION TIME (DT): 197 MS
ECHO MV E VELOCITY: 0.92 M/S
ECHO MV E/A RATIO: 0.97
ECHO MV E/E' LATERAL: 6.34
ECHO MV E/E' RATIO (AVERAGED): 8.02
ECHO MV E/E' SEPTAL: 9.7
ECHO MV LVOT VTI INDEX: 1.08
ECHO MV MAX VELOCITY: 1 M/S
ECHO MV MEAN GRADIENT: 2 MMHG
ECHO MV MEAN VELOCITY: 0.7 M/S
ECHO MV PEAK GRADIENT: 4 MMHG
ECHO MV VTI: 27.3 CM
ECHO RA AREA 4C: 12.6 CM2
ECHO RA END SYSTOLIC VOLUME APICAL 4 CHAMBER INDEX BSA: 18 ML/M2
ECHO RA VOLUME: 27 ML
ECHO RIGHT VENTRICULAR SYSTOLIC PRESSURE (RVSP): 29 MMHG
ECHO RV BASAL DIMENSION: 2.6 CM
ECHO RV FREE WALL PEAK S': 14.5 CM/S
ECHO RV LONGITUDINAL DIMENSION: 5.9 CM
ECHO RV MID DIMENSION: 2.4 CM
ECHO RV TAPSE: 2.5 CM (ref 1.7–?)
ECHO RVOT MEAN GRADIENT: 1 MMHG
ECHO RVOT PEAK GRADIENT: 1 MMHG
ECHO RVOT PEAK VELOCITY: 0.6 M/S
ECHO RVOT VTI: 14.1 CM
ECHO TV REGURGITANT MAX VELOCITY: 2.55 M/S
ECHO TV REGURGITANT PEAK GRADIENT: 26 MMHG
ECHO TV REGURGITANT PEAK GRADIENT: 26 MMHG

## 2025-07-29 PROCEDURE — 93306 TTE W/DOPPLER COMPLETE: CPT | Performed by: INTERNAL MEDICINE

## 2025-07-29 PROCEDURE — 93306 TTE W/DOPPLER COMPLETE: CPT

## 2025-07-30 ENCOUNTER — TELEPHONE (OUTPATIENT)
Dept: CARDIOLOGY CLINIC | Age: 80
End: 2025-07-30

## 2025-07-30 NOTE — TELEPHONE ENCOUNTER
Patient notes that she stopped losartan due to low BP and dizziness and now taking amlodipine 2.5 mg nightly about a month ago. Rest of meds are the same.   7/30  159/74 HR 62 lying am-107/62 standing hours later  7/29  122/81 HR 76 standing /63 HR 74 hours later  7/28  171/90 HR 68 lying am 103/63 HR 77 standing hours later  7/27  145/77 HR 67 lying am 105/57 HR 62 standing 143/76 HR 74 standing PM   7/26  145/75 HR 62 lying 7am 96/55 HR 70 standing 7:30AM  She is taking beet supplement  She is still having dizziness.

## 2025-08-07 ENCOUNTER — HOSPITAL ENCOUNTER (OUTPATIENT)
Dept: MAMMOGRAPHY | Age: 80
Discharge: HOME | End: 2025-08-12
Payer: MEDICARE

## 2025-08-07 VITALS — HEIGHT: 62 IN | BODY MASS INDEX: 20.24 KG/M2 | WEIGHT: 110 LBS

## 2025-08-07 DIAGNOSIS — Z12.31 VISIT FOR SCREENING MAMMOGRAM: ICD-10-CM

## 2025-08-07 PROCEDURE — 77063 BREAST TOMOSYNTHESIS BI: CPT

## (undated) DEVICE — GLOVE SURG SZ 75 L12IN FNGR THK94MIL STD WHT LTX FREE

## (undated) DEVICE — PADDING CAST W4INXL4YD NONSTERILE COT RAYON MICROPLEATED

## (undated) DEVICE — SMARTGOWN SURGICAL GOWN, 2XL: Brand: CONVERTORS

## (undated) DEVICE — SKIN MARKER,REGULAR TIP WITH RULER AND LABELS: Brand: DEVON

## (undated) DEVICE — Device

## (undated) DEVICE — STANDARD HYPODERMIC NEEDLE,POLYPROPYLENE HUB: Brand: MONOJECT

## (undated) DEVICE — GLOVE SURG SZ 65 THK91MIL LTX FREE SYN POLYISOPRENE

## (undated) DEVICE — FEMORAL CANAL TIP, IRRIGATION/SUCTION

## (undated) DEVICE — GLOVE SURG SZ 65 L12IN FNGR THK94MIL STD WHT LTX FREE

## (undated) DEVICE — GLOVE ORANGE PI 8 1/2   MSG9085

## (undated) DEVICE — SOLUTION SODIUM CHL 0.9% 500 ML IRRI BTL

## (undated) DEVICE — 35 ML SYRINGE LUER-LOCK TIP: Brand: MONOJECT

## (undated) DEVICE — 3M™ TEGADERM™ TRANSPARENT FILM DRESSING FRAME STYLE, 1624W, 2-3/8 IN X 2-3/4 IN (6 CM X 7 CM), 100/CT 4CT/CASE: Brand: 3M™ TEGADERM™

## (undated) DEVICE — SYRINGE MED 10ML LUERLOCK TIP W/O SFTY DISP

## (undated) DEVICE — OPTIFOAM GENTLE SA, POSTOP, 4X10: Brand: MEDLINE

## (undated) DEVICE — GLOVE SURG SZ 75 L12IN THK75MIL DK GRN LTX FREE

## (undated) DEVICE — SET ADMIN PRIMING 7ML L30IN 7.35LB 20 GTT 2ND RLER CLMP

## (undated) DEVICE — MEDC BAG ICE SM REFILLABLE W/TIES

## (undated) DEVICE — SUTURE MCRYL SZ 0 L18IN ABSRB VLT L36MM CT-1 1/2 CIR Y740D

## (undated) DEVICE — DRAPE C ARM W41XL125IN UNIV W CLP AND BND FOR FULL SZ C ARM

## (undated) DEVICE — SOLUTION IRRIG 1000ML H2O PIC PLAS SHATTERPROOF CONTAINER

## (undated) DEVICE — ABDOMINAL PAD: Brand: DERMACEA

## (undated) DEVICE — CHLORAPREP 26ML ORANGE

## (undated) DEVICE — SUTURE ETHBND EXCEL SZ 5 L30IN NONABSORBABLE GRN L40MM V-37 MB66G

## (undated) DEVICE — PENCIL SMK EVAC ALL IN 1 DSGN ENH VISIBILITY IMPROVED AIR

## (undated) DEVICE — SUTURE ETHLN SZ 4-0 L18IN NONABSORBABLE BLK L19MM PS-2 3/8 1667H

## (undated) DEVICE — SPLINT ORTH W3XL12IN LAYERED FBRGLS FOAM PD BRTH BK MOLD

## (undated) DEVICE — TOWEL,OR,DSP,ST,BLUE,STD,6/PK,12PK/CS: Brand: MEDLINE

## (undated) DEVICE — T4 HOOD

## (undated) DEVICE — KENDALL SCD EXPRESS SLEEVES, KNEE LENGTH, MEDIUM: Brand: KENDALL SCD

## (undated) DEVICE — BLADE OPHTH 180DEG CUT SURF BLU STR SHRP DBL BVL GRINDLESS

## (undated) DEVICE — SOLUTION IV IRRIG 500ML 0.9% SODIUM CHL 2F7123

## (undated) DEVICE — Z CONVERTED USE 2271043 CONTAINER SPEC COLL 4OZ SCR ON LID PEEL PCH

## (undated) DEVICE — NEEDLE HYPO 25GA L1.5IN BVL ORIENTED ECLIPSE

## (undated) DEVICE — REFLECTION FLEXIBLE DRILL 25MM: Brand: REFLECTION

## (undated) DEVICE — BIT DRL L170MM DIA2MM FOR HUM GLEN PREP INSTR

## (undated) DEVICE — SOLUTION IV 1000ML LAC RINGERS PH 6.5 INJ USP VIAFLX PLAS

## (undated) DEVICE — ADHESIVE SKIN CLSR 0.7ML TOP DERMBND ADV

## (undated) DEVICE — SET GRAV VENT NVENT CK VLV 3 NDL FREE PRT 10 GTT

## (undated) DEVICE — CORD ES L12FT BPLR FRCP

## (undated) DEVICE — GOWN,SIRUS,POLYRNF,SETINSLV,L,20/CS: Brand: MEDLINE

## (undated) DEVICE — SOLUTION IRRIG 2000ML 0.9% SOD CHL USP UROMATIC PLAS CONT

## (undated) DEVICE — MATERIAL PD W2XL4YD ST COT CAST SPLNT NONADHESIVE SPEC

## (undated) DEVICE — PEEL-AWAY TOGA, 2X LARGE: Brand: FLYTE

## (undated) DEVICE — SMARTGOWN SURGICAL GOWN, XL: Brand: CONVERTORS

## (undated) DEVICE — SMARTGOWN SURGICAL GOWN, LARGE: Brand: CONVERTORS

## (undated) DEVICE — 10FR FRAZIER SUCTION HANDLE: Brand: CARDINAL HEALTH

## (undated) DEVICE — ZIMMER® STERILE DISPOSABLE TOURNIQUET CUFF WITH PLC, DUAL PORT, SINGLE BLADDER, 18 IN. (46 CM)

## (undated) DEVICE — NEEDLE SUT SZ 5 S STL MAYO CATGUT 1/2 CIR TAPR PNT DISP

## (undated) DEVICE — CATHETER IV 20GA L1.25IN PNK FEP SFTY STR HUB RADPQ DISP

## (undated) DEVICE — MEDI-VAC NON-CONDUCTIVE SUCTION TUBING: Brand: CARDINAL HEALTH

## (undated) DEVICE — COMFO-TEX ELASTIC BANDAGE LATEX FREE, 2INX5YD: Brand: COMFO-TEX™

## (undated) DEVICE — GOWN,SIRUS,NON REINFRCD,LARGE,SET IN SL: Brand: MEDLINE

## (undated) DEVICE — SUTURE MCRYL SZ 4-0 L18IN ABSRB UD L19MM PS-2 3/8 CIR PRIM Y496G

## (undated) DEVICE — STERILE PVP: Brand: MEDLINE INDUSTRIES, INC.

## (undated) DEVICE — NEEDLE HYPO 25GA L1.5IN BLU POLYPR HUB S STL REG BVL STR

## (undated) DEVICE — GLOVE ORANGE PI 7 1/2   MSG9075